# Patient Record
Sex: MALE | Race: WHITE | NOT HISPANIC OR LATINO | Employment: FULL TIME | URBAN - METROPOLITAN AREA
[De-identification: names, ages, dates, MRNs, and addresses within clinical notes are randomized per-mention and may not be internally consistent; named-entity substitution may affect disease eponyms.]

---

## 2018-01-05 ENCOUNTER — GENERIC CONVERSION - ENCOUNTER (OUTPATIENT)
Dept: OTHER | Facility: OTHER | Age: 48
End: 2018-01-05

## 2018-02-12 ENCOUNTER — OFFICE VISIT (OUTPATIENT)
Dept: FAMILY MEDICINE CLINIC | Facility: CLINIC | Age: 48
End: 2018-02-12
Payer: COMMERCIAL

## 2018-02-12 VITALS
WEIGHT: 292.8 LBS | TEMPERATURE: 98.3 F | BODY MASS INDEX: 38.8 KG/M2 | SYSTOLIC BLOOD PRESSURE: 120 MMHG | HEART RATE: 80 BPM | RESPIRATION RATE: 16 BRPM | DIASTOLIC BLOOD PRESSURE: 80 MMHG | HEIGHT: 73 IN

## 2018-02-12 DIAGNOSIS — R74.8 ELEVATED LIVER ENZYMES: ICD-10-CM

## 2018-02-12 DIAGNOSIS — Z00.00 PHYSICAL EXAM, ANNUAL: Primary | ICD-10-CM

## 2018-02-12 DIAGNOSIS — H31.103: ICD-10-CM

## 2018-02-12 PROCEDURE — 99396 PREV VISIT EST AGE 40-64: CPT | Performed by: FAMILY MEDICINE

## 2018-02-12 RX ORDER — ALBUTEROL SULFATE 90 UG/1
2 AEROSOL, METERED RESPIRATORY (INHALATION) EVERY 6 HOURS PRN
COMMUNITY
End: 2018-10-30 | Stop reason: SDUPTHER

## 2018-02-12 NOTE — PROGRESS NOTES
Chief Complaint   Patient presents with    Physical Exam        Patient ID: Nicole Colindres is a 49 y o  male  Pt seen and examined  Sees a physician at [facility] but needs referral     Pt had bw done in June  Pt has lost 35 lbs since July and had repeat done in October    Has hx of elevated liver enzymes  Last cmp in June noted AST=43 and ALT=56    Has hx of macular degeneration and vision loss and sees retinal specialist -- needs a referral          Past Medical History:   Diagnosis Date    Anal fissure 01/16/2004    Infectious gastroenteritis 12/11/2012    Pilonidal cyst with abscess 01/16/2004    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       Past Surgical History:   Procedure Laterality Date    HIP SURGERY      TONSILLECTOMY      VASECTOMY         Patient Active Problem List   Diagnosis    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       Family History   Problem Relation Age of Onset    Depression Mother     Hypertension Mother     Diabetes Mother     Arthritis Mother     Cancer Father     Heart disease Father        Immunization History   Administered Date(s) Administered    Td (adult), adsorbed 05/21/2007       Allergies   Allergen Reactions    Cat Hair Extract        Current Outpatient Prescriptions   Medication Sig Dispense Refill    albuterol (PROVENTIL HFA,VENTOLIN HFA) 90 mcg/act inhaler Inhale 2 puffs every 6 (six) hours as needed for wheezing       No current facility-administered medications for this visit          Social History     Social History    Marital status: /Civil Union     Spouse name: N/A    Number of children: N/A    Years of education: N/A     Social History Main Topics    Smoking status: Never Smoker    Smokeless tobacco: Never Used    Alcohol use Yes      Comment: Social drinker    Drug use: No    Sexual activity: Not Asked     Other Topics Concern    None     Social History Narrative    None       Review of Systems   Constitutional: Negative for activity change, appetite change and fatigue  Respiratory: Negative for cough and chest tightness  Cardiovascular: Negative for chest pain, palpitations and leg swelling  Gastrointestinal: Negative for abdominal pain, constipation, diarrhea, nausea and vomiting  Genitourinary: Negative for difficulty urinating  Musculoskeletal: Negative for arthralgias and myalgias  Neurological: Negative for dizziness, weakness and headaches  Hematological: Negative for adenopathy  Psychiatric/Behavioral: Negative for behavioral problems  The patient is not nervous/anxious  Objective:    /80 (BP Location: Left arm, Patient Position: Sitting, Cuff Size: Standard)   Pulse 80   Temp 98 3 °F (36 8 °C)   Resp 16   Ht 6' 1" (1 854 m)   Wt 133 kg (292 lb 12 8 oz)   BMI 38 63 kg/m²        Physical Exam   Constitutional: He is oriented to person, place, and time  He appears well-developed and well-nourished  No distress  HENT:   Head: Normocephalic and atraumatic  Right Ear: External ear normal    Left Ear: External ear normal    Nose: Nose normal    Mouth/Throat: Oropharynx is clear and moist    Eyes: Conjunctivae and EOM are normal  Pupils are equal, round, and reactive to light  Neck: Normal range of motion  Neck supple  No thyromegaly present  Cardiovascular: Normal rate, regular rhythm, normal heart sounds and intact distal pulses  No murmur heard  Pulmonary/Chest: Effort normal and breath sounds normal    Abdominal: Soft  Bowel sounds are normal  He exhibits no distension  There is no tenderness  There is no rebound and no guarding  Musculoskeletal: Normal range of motion  He exhibits no edema or deformity  Neurological: He is alert and oriented to person, place, and time  He has normal reflexes  No cranial nerve deficit  Coordination normal    Skin: Skin is warm and dry  Psychiatric: He has a normal mood and affect   His behavior is normal  Judgment and thought content normal  Assessment/Plan:         Diagnoses and all orders for this visit:    Physical exam, annual    Elevated liver enzymes  Comments:  will get hep panel and u/s  Orders:  -     US liver; Future  -     Hepatitis panel, acute; Future  -     Comprehensive metabolic panel; Future  -     Hepatitis panel, acute  -     Comprehensive metabolic panel    BMI 85 8-10 9,JAHQT  Comments:  great job on the weight loss    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  Comments:  referral for retinal specialst given   Orders:  -     Ambulatory referral to Vitreoretinal Surgery; Future    Other orders  -     albuterol (PROVENTIL HFA,VENTOLIN HFA) 90 mcg/act inhaler; Inhale 2 puffs every 6 (six) hours as needed for wheezing        Reviewed with patient the appropriate health maintenance testing  needed  Discussed the importance of diet and exercise and the role it plays in prevention of disease  Discussed with patient the importance of emotional health as well as physical health  Immunizations reviewed and discussed  There are no Patient Instructions on file for this visit                    Hannah Lance, DO

## 2018-09-28 ENCOUNTER — OFFICE VISIT (OUTPATIENT)
Dept: FAMILY MEDICINE CLINIC | Facility: CLINIC | Age: 48
End: 2018-09-28
Payer: COMMERCIAL

## 2018-09-28 VITALS
DIASTOLIC BLOOD PRESSURE: 80 MMHG | BODY MASS INDEX: 42.22 KG/M2 | WEIGHT: 315 LBS | HEART RATE: 72 BPM | RESPIRATION RATE: 14 BRPM | SYSTOLIC BLOOD PRESSURE: 152 MMHG | TEMPERATURE: 98.1 F

## 2018-09-28 DIAGNOSIS — M54.41 CHRONIC RIGHT-SIDED LOW BACK PAIN WITH RIGHT-SIDED SCIATICA: Primary | ICD-10-CM

## 2018-09-28 DIAGNOSIS — G89.29 CHRONIC RIGHT-SIDED LOW BACK PAIN WITH RIGHT-SIDED SCIATICA: Primary | ICD-10-CM

## 2018-09-28 DIAGNOSIS — R03.0 ELEVATED BLOOD PRESSURE READING WITHOUT DIAGNOSIS OF HYPERTENSION: ICD-10-CM

## 2018-09-28 PROCEDURE — 99213 OFFICE O/P EST LOW 20 MIN: CPT | Performed by: NURSE PRACTITIONER

## 2018-09-28 PROCEDURE — 96372 THER/PROPH/DIAG INJ SC/IM: CPT

## 2018-09-28 RX ORDER — METHYLPREDNISOLONE ACETATE 40 MG/ML
40 INJECTION, SUSPENSION INTRA-ARTICULAR; INTRALESIONAL; INTRAMUSCULAR; SOFT TISSUE ONCE
Status: COMPLETED | OUTPATIENT
Start: 2018-09-28 | End: 2018-09-28

## 2018-09-28 RX ORDER — CYCLOBENZAPRINE HCL 10 MG
10 TABLET ORAL
Qty: 30 TABLET | Refills: 0 | Status: SHIPPED | OUTPATIENT
Start: 2018-09-28 | End: 2018-10-30 | Stop reason: SDUPTHER

## 2018-09-28 RX ORDER — KETOROLAC TROMETHAMINE 30 MG/ML
30 INJECTION, SOLUTION INTRAMUSCULAR; INTRAVENOUS ONCE
Status: COMPLETED | OUTPATIENT
Start: 2018-09-28 | End: 2018-09-28

## 2018-09-28 RX ADMIN — METHYLPREDNISOLONE ACETATE 40 MG: 40 INJECTION, SUSPENSION INTRA-ARTICULAR; INTRALESIONAL; INTRAMUSCULAR; SOFT TISSUE at 15:11

## 2018-09-28 RX ADMIN — KETOROLAC TROMETHAMINE 30 MG: 30 INJECTION, SOLUTION INTRAMUSCULAR; INTRAVENOUS at 15:10

## 2018-09-28 NOTE — PROGRESS NOTES
Subjective   Jackson Cagle is a 51 y o  male who presents for evaluation of low back pain  The patient has had recurrent self limited episodes of low back pain in the past  Symptoms have been present for 1 week and are unchanged  Onset was related to / precipitated by no known injury and likely related to prolonged periods of yard work, weeding, slouching  The pain is located in the right gluteal area or across the lower back and radiates to the buttock  The pain is described as aching, sharp and stiffness and occurs intermittently  He rates his pain as mild  Symptoms are exacerbated by extension, flexion, lying down, twisting and bending  Symptoms are improved by nothing  He has also tried acetaminophen which provided no symptom relief  He denies fever, chills, rash, weakness in the right leg, weakness in the left leg, tingling in the right leg, tingling in the left leg, burning pain in the right leg, burning pain in the left leg, urinary hesitancy, urinary incontinence, urinary retention, bowel incontinence, constipation, impotence and groin/perineal numbness associated with the back pain  The patient has no "red flag" history indicative of complicated back pain  He reports few courses of PT in the past which have helped  "I fell off the wagon and stopped doing the exercises " ++40 pound weight gain since 2/2018    BP elevated today He denies h/o high bp  "Usually 120/80"  Reports significant stressors at work in past 3 weeks     The following portions of the patient's history were reviewed and updated as appropriate: allergies, current medications, past family history, past medical history, past social history, past surgical history and problem list     Review of Systems  Pertinent items are noted in HPI       Objective   Normal reflexes, gait, strength and negative straight-leg raise    Inspection and palpation: inspection of back is normal, paraspinal tenderness noted lumbar region, sacroiliac tenderness noted right notch  Neurological: normal strenth and sensation       Assessment/Plan   Nonspecific acute low back pain  Natural history and expected course discussed  Questions answered  Educational material distributed  Proper lifting, bending technique discussed  Stretching exercises discussed  Regular aerobic and trunk strengthening exercises discussed  Short (2-4 day) period of relative rest recommended until acute symptoms improve  Heat to affected area as needed for local pain relief  OTC analgesics as needed  Muscle relaxants per medication orders  Follow-up in 3 weeks  Consider PT-pt will call

## 2018-10-17 ENCOUNTER — TELEPHONE (OUTPATIENT)
Dept: FAMILY MEDICINE CLINIC | Facility: CLINIC | Age: 48
End: 2018-10-17

## 2018-10-17 DIAGNOSIS — M54.9 CHRONIC BACK PAIN GREATER THAN 3 MONTHS DURATION: Primary | ICD-10-CM

## 2018-10-17 DIAGNOSIS — G89.29 CHRONIC BACK PAIN GREATER THAN 3 MONTHS DURATION: Primary | ICD-10-CM

## 2018-10-17 NOTE — TELEPHONE ENCOUNTER
Janel Sterling    Patient would like to start physical therapy for back pain, as discussed at last visit  He will have it done where he works- [address]  Please write order for same and fa to [phone]

## 2018-10-30 ENCOUNTER — OFFICE VISIT (OUTPATIENT)
Dept: FAMILY MEDICINE CLINIC | Facility: CLINIC | Age: 48
End: 2018-10-30
Payer: COMMERCIAL

## 2018-10-30 VITALS
TEMPERATURE: 97.3 F | SYSTOLIC BLOOD PRESSURE: 132 MMHG | RESPIRATION RATE: 14 BRPM | WEIGHT: 315 LBS | DIASTOLIC BLOOD PRESSURE: 100 MMHG | BODY MASS INDEX: 42.22 KG/M2 | HEART RATE: 78 BPM

## 2018-10-30 DIAGNOSIS — G89.29 CHRONIC RIGHT-SIDED LOW BACK PAIN WITH RIGHT-SIDED SCIATICA: Primary | ICD-10-CM

## 2018-10-30 DIAGNOSIS — M54.41 CHRONIC RIGHT-SIDED LOW BACK PAIN WITH RIGHT-SIDED SCIATICA: Primary | ICD-10-CM

## 2018-10-30 DIAGNOSIS — J45.20 MILD INTERMITTENT ASTHMA WITHOUT COMPLICATION: ICD-10-CM

## 2018-10-30 PROCEDURE — 99213 OFFICE O/P EST LOW 20 MIN: CPT | Performed by: NURSE PRACTITIONER

## 2018-10-30 PROCEDURE — 1036F TOBACCO NON-USER: CPT | Performed by: NURSE PRACTITIONER

## 2018-10-30 RX ORDER — ALBUTEROL SULFATE 90 UG/1
2 AEROSOL, METERED RESPIRATORY (INHALATION) EVERY 6 HOURS PRN
Qty: 1 INHALER | Refills: 5 | Status: SHIPPED | OUTPATIENT
Start: 2018-10-30 | End: 2020-06-01

## 2018-10-30 RX ORDER — CYCLOBENZAPRINE HCL 10 MG
10 TABLET ORAL
Qty: 30 TABLET | Refills: 0 | Status: SHIPPED | OUTPATIENT
Start: 2018-10-30 | End: 2019-04-19 | Stop reason: ALTCHOICE

## 2018-10-30 RX ORDER — TRAMADOL HYDROCHLORIDE 50 MG/1
50 TABLET ORAL EVERY 12 HOURS PRN
Qty: 14 TABLET | Refills: 0 | Status: SHIPPED | OUTPATIENT
Start: 2018-10-30 | End: 2019-04-19 | Stop reason: ALTCHOICE

## 2018-10-30 NOTE — PROGRESS NOTES
David Yoder is a 51 y o  male who presents for re-evaluation of low back pain  The patient has had recurrent self limited episodes of low back pain in the past  Symptoms have been present for 1  Month and are unchanged  Onset was related to / precipitated by no known injury and likely related to prolonged periods of yard work, weeding, slouching  The pain is located in the right gluteal area or across the lower back and radiates to the buttock  The pain is described as aching, sharp and stiffness and occurs intermittently  He rates his pain as mild  Symptoms are exacerbated by extension, flexion, lying down, twisting and bending  Symptoms are improved by nothing  He has also tried acetaminophen, flexeril, aleve which provided no symptom relief  He denies fever, chills, rash, weakness in the right leg, weakness in the left leg, tingling in the right leg, tingling in the left leg, burning pain in the right leg, burning pain in the left leg, urinary hesitancy, urinary incontinence, urinary retention, bowel incontinence, constipation, impotence and groin/perineal numbness associated with the back pain  The patient has no "red flag" history indicative of complicated back pain  He reports few courses of PT in the past which have helped  "I fell off the wagon and stopped doing the exercises " ++40 pound weight gain since 2/2018  Since last visit he resumed PT at work  Had 3 sessions  +improved mobility    BP better today    The following portions of the patient's history were reviewed and updated as appropriate: allergies, current medications, past family history, past medical history, past social history, past surgical history and problem list     Review of Systems  Pertinent items are noted in HPI       Objective   Normal reflexes, gait, strength and negative straight-leg raise    Inspection and palpation: inspection of back is normal, paraspinal tenderness noted lumbar region, sacroiliac tenderness noted right notch  Neurological: normal strenth and sensation       Assessment/Plan   Nonspecific acute low back pain  Natural history and expected course discussed  Questions answered  Educational material distributed  Proper lifting, bending technique discussed  Stretching exercises discussed  Regular aerobic and trunk strengthening exercises discussed  Cont PT  Heat to affected area as needed for local pain relief  OTC analgesics as needed  Muscle relaxants per medication orders    Tramadol only for severe pain  XR of lumbar spine  Consider referral to Spine center in 6 weeks if no better with current plan

## 2018-11-01 ENCOUNTER — TELEPHONE (OUTPATIENT)
Dept: PHYSICAL THERAPY | Facility: OTHER | Age: 48
End: 2018-11-01

## 2018-11-01 NOTE — TELEPHONE ENCOUNTER
Unsuccessful attempt at reaching patient  Left voicemail message with callback number, asking for return call

## 2018-11-02 ENCOUNTER — HOSPITAL ENCOUNTER (OUTPATIENT)
Dept: RADIOLOGY | Facility: HOSPITAL | Age: 48
Discharge: HOME | End: 2018-11-02
Payer: COMMERCIAL

## 2018-11-02 ENCOUNTER — TRANSCRIBE ORDERS (OUTPATIENT)
Dept: ADMINISTRATIVE | Facility: HOSPITAL | Age: 48
End: 2018-11-02

## 2018-11-02 DIAGNOSIS — M54.41 CHRONIC RIGHT-SIDED LOW BACK PAIN WITH RIGHT-SIDED SCIATICA: ICD-10-CM

## 2018-11-02 DIAGNOSIS — G89.29 CHRONIC RIGHT-SIDED LOW BACK PAIN WITH RIGHT-SIDED SCIATICA: ICD-10-CM

## 2018-11-02 PROCEDURE — 72110 X-RAY EXAM L-2 SPINE 4/>VWS: CPT

## 2018-11-02 NOTE — PROGRESS NOTES
lmtrc - please advise patient of britney's message- "Xray shows arthritic changes cont plan of care" and document the same, ty

## 2018-11-05 ENCOUNTER — TELEPHONE (OUTPATIENT)
Dept: FAMILY MEDICINE CLINIC | Facility: CLINIC | Age: 48
End: 2018-11-05

## 2018-11-06 ENCOUNTER — TELEPHONE (OUTPATIENT)
Dept: PHYSICAL THERAPY | Facility: OTHER | Age: 48
End: 2018-11-06

## 2018-11-06 NOTE — TELEPHONE ENCOUNTER
Multiple unsuccessful attempts at reaching patient over multi week time frame  Left voicemail message with callback number asking for return call  Referral to be closed at this time

## 2019-03-21 ENCOUNTER — OFFICE VISIT (OUTPATIENT)
Dept: FAMILY MEDICINE CLINIC | Facility: CLINIC | Age: 49
End: 2019-03-21
Payer: COMMERCIAL

## 2019-03-21 VITALS
DIASTOLIC BLOOD PRESSURE: 74 MMHG | BODY MASS INDEX: 38.19 KG/M2 | HEART RATE: 76 BPM | TEMPERATURE: 98 F | HEIGHT: 74 IN | RESPIRATION RATE: 16 BRPM | SYSTOLIC BLOOD PRESSURE: 120 MMHG | WEIGHT: 297.6 LBS

## 2019-03-21 DIAGNOSIS — Z13.1 SCREENING FOR DIABETES MELLITUS: ICD-10-CM

## 2019-03-21 DIAGNOSIS — Z12.5 PROSTATE CANCER SCREENING: ICD-10-CM

## 2019-03-21 DIAGNOSIS — R73.01 ELEVATED FASTING BLOOD SUGAR: ICD-10-CM

## 2019-03-21 DIAGNOSIS — Z13.220 LIPID SCREENING: ICD-10-CM

## 2019-03-21 DIAGNOSIS — Z13.29 SCREENING FOR THYROID DISORDER: ICD-10-CM

## 2019-03-21 DIAGNOSIS — Z13.0 SCREENING FOR DEFICIENCY ANEMIA: ICD-10-CM

## 2019-03-21 DIAGNOSIS — R42 DIZZINESS: Primary | ICD-10-CM

## 2019-03-21 PROCEDURE — 99213 OFFICE O/P EST LOW 20 MIN: CPT | Performed by: NURSE PRACTITIONER

## 2019-03-21 PROCEDURE — 1036F TOBACCO NON-USER: CPT | Performed by: NURSE PRACTITIONER

## 2019-03-21 PROCEDURE — 3008F BODY MASS INDEX DOCD: CPT | Performed by: NURSE PRACTITIONER

## 2019-03-22 NOTE — PATIENT INSTRUCTIONS
Dizziness   WHAT YOU NEED TO KNOW:   Dizziness is a feeling of being off balance or unsteady  Common causes of dizziness are an inner ear fluid imbalance or a lack of oxygen in your blood  Dizziness may be acute (lasts 3 days or less) or chronic (lasts longer than 3 days)  You may have dizzy spells that last from seconds to a few hours  DISCHARGE INSTRUCTIONS:   Return to the emergency department if:   · You have a headache and a stiff neck  · You have shaking chills and a fever  · You vomit over and over with no relief  · Your vomit or bowel movements are red or black  · You have pain in your chest, back, or abdomen  · You have numbness, especially in your face, arms, or legs  · You have trouble moving your arms or legs  · You are confused  Contact your healthcare provider if:   · You have a fever  · Your symptoms do not get better with treatment  · You have questions or concerns about your condition or care  Manage your symptoms:   · Do not drive  or operate heavy machinery when you are dizzy  · Get up slowly  from sitting or lying down  · Drink plenty of liquids  Liquids help prevent dehydration  Ask how much liquid to drink each day and which liquids are best for you  Follow up with your healthcare provider as directed:  Write down your questions so you remember to ask them during your visits  © 2017 [employer]  Information is for End User's use only and may not be sold, redistributed or otherwise used for commercial purposes  All illustrations and images included in CareNotes® are the copyrighted property of A D [employer] M [employer]  or [employer]  The above information is an  only  It is not intended as medical advice for individual conditions or treatments  Talk to your doctor, nurse or pharmacist before following any medical regimen to see if it is safe and effective for you

## 2019-03-22 NOTE — PROGRESS NOTES
Assessment/Plan:    Problem List Items Addressed This Visit        Other    Screening for diabetes mellitus    Relevant Orders    Hemoglobin A1C    Comprehensive metabolic panel    Screening for thyroid disorder    Relevant Orders    TSH, 3rd generation with Free T4 reflex      Other Visit Diagnoses     Dizziness    -  Primary    Relevant Orders    CBC and Platelet    TSH, 3rd generation with Free T4 reflex    Hemoglobin A1C    Comprehensive metabolic panel    BMI 39 3-63 1,TTMLU        Lipid screening        Relevant Orders    Lipid panel    Screening for deficiency anemia        Relevant Orders    CBC and Platelet    Prostate cancer screening        Relevant Orders    PSA, Serum (Serial Monitor)    Elevated fasting blood sugar        Relevant Orders    Hemoglobin A1C        Exam unrevealing  Transient dizziness may have been caused by a number of things  Advised comprehensive labs, physical, ekg  Consider sleep study  Patient agrees to same will have blood work, schedule PE  Will call with relapse of sxs    BMI Counseling: Body mass index is 38 21 kg/m²  Discussed the patient's BMI with him  The BMI is above average  BMI counseling and education was provided to the patient  Nutrition recommendations include reducing portion sizes, decreasing overall calorie intake and 3-5 servings of fruits/vegetables daily  Exercise recommendations include exercising 3-5 times per week  Patient Instructions   Dizziness   WHAT YOU NEED TO KNOW:   Dizziness is a feeling of being off balance or unsteady  Common causes of dizziness are an inner ear fluid imbalance or a lack of oxygen in your blood  Dizziness may be acute (lasts 3 days or less) or chronic (lasts longer than 3 days)  You may have dizzy spells that last from seconds to a few hours  DISCHARGE INSTRUCTIONS:   Return to the emergency department if:   · You have a headache and a stiff neck  · You have shaking chills and a fever       · You vomit over and over with no relief  · Your vomit or bowel movements are red or black  · You have pain in your chest, back, or abdomen  · You have numbness, especially in your face, arms, or legs  · You have trouble moving your arms or legs  · You are confused  Contact your healthcare provider if:   · You have a fever  · Your symptoms do not get better with treatment  · You have questions or concerns about your condition or care  Manage your symptoms:   · Do not drive  or operate heavy machinery when you are dizzy  · Get up slowly  from sitting or lying down  · Drink plenty of liquids  Liquids help prevent dehydration  Ask how much liquid to drink each day and which liquids are best for you  Follow up with your healthcare provider as directed:  Write down your questions so you remember to ask them during your visits  © 2017 [employer] Information is for End User's use only and may not be sold, redistributed or otherwise used for commercial purposes  All illustrations and images included in CareNotes® are the copyrighted property of [employer] Inc  or [employer]  The above information is an  only  It is not intended as medical advice for individual conditions or treatments  Talk to your doctor, nurse or pharmacist before following any medical regimen to see if it is safe and effective for you  Return for Annual physical     Subjective:      Patient ID: Eugenie Olivera is a 51 y o  male  Chief Complaint   Patient presents with    Dizziness     vision tilted and felt lightheaded for about a minute , then [id number] later it happened again   This happen last saturday       Dizziness   This is a new problem  Episode onset: one episode on saturday 3/16  lasted seconds  Episode frequency: no recurrence since sat 3/16  Associated symptoms include congestion and headaches   Pertinent negatives include no abdominal pain, arthralgias, chest pain, chills, diaphoresis, fatigue, fever, myalgias, nausea, neck pain, numbness, rash, swollen glands, vertigo, visual change, vomiting or weakness  Associated symptoms comments: Mild headache accompanied dizziness    Exacerbated by: being outside during windy day during sporting event  He has tried nothing for the symptoms  The following portions of the patient's history were reviewed and updated as appropriate: allergies, current medications, past family history, past medical history, past social history, past surgical history and problem list     Review of Systems   Constitutional: Negative for chills, diaphoresis, fatigue and fever  HENT: Positive for congestion  Negative for trouble swallowing  Eyes: Negative for visual disturbance  Respiratory: Negative  Cardiovascular: Negative  Negative for chest pain  Gastrointestinal: Negative for abdominal pain, nausea and vomiting  Endocrine: Negative  Musculoskeletal: Negative for arthralgias, myalgias and neck pain  Skin: Negative for rash  Neurological: Positive for dizziness and headaches  Negative for vertigo, tremors, syncope, weakness and numbness  Psychiatric/Behavioral: Negative  Current Outpatient Medications   Medication Sig Dispense Refill    albuterol (PROVENTIL HFA,VENTOLIN HFA) 90 mcg/act inhaler Inhale 2 puffs every 6 (six) hours as needed for wheezing 1 Inhaler 5    cyclobenzaprine (FLEXERIL) 10 mg tablet Take 1 tablet (10 mg total) by mouth daily at bedtime as needed for muscle spasms (Patient not taking: Reported on 3/21/2019) 30 tablet 0    traMADol (ULTRAM) 50 mg tablet Take 1 tablet (50 mg total) by mouth every 12 (twelve) hours as needed for moderate pain for up to 14 doses (Patient not taking: Reported on 3/21/2019) 14 tablet 0     No current facility-administered medications for this visit          Objective:    /74 (BP Location: Left arm, Patient Position: Sitting, Cuff Size: Large)   Pulse 76   Temp 98 °F (36 7 °C)   Resp 16   Ht 6' 2" (1 88 m)   Wt 135 kg (297 lb 9 6 oz)   BMI 38 21 kg/m²        Physical Exam   Constitutional: He is oriented to person, place, and time  Vital signs are normal  He appears well-developed and well-nourished  No distress  HENT:   Head: Normocephalic and atraumatic  Mouth/Throat: Oropharynx is clear and moist    Eyes: Pupils are equal, round, and reactive to light  Conjunctivae and EOM are normal    Neck: Normal range of motion  Neck supple  No thyromegaly present  Cardiovascular: Normal rate, regular rhythm and intact distal pulses  Pulmonary/Chest: Effort normal and breath sounds normal    Musculoskeletal: He exhibits no edema  Lymphadenopathy:     He has no cervical adenopathy  Neurological: He is alert and oriented to person, place, and time  He displays a negative Romberg sign  Coordination and gait normal    Skin: Skin is warm and dry  Capillary refill takes less than 2 seconds  No pallor  Psychiatric: He has a normal mood and affect  Nursing note and vitals reviewed               Itz Nam, [facility]

## 2019-04-06 LAB
ALBUMIN SERPL-MCNC: 4.4 G/DL (ref 3.5–5.5)
ALBUMIN/GLOB SERPL: 1.8 {RATIO} (ref 1.2–2.2)
ALP SERPL-CCNC: 75 IU/L (ref 39–117)
ALT SERPL-CCNC: 51 IU/L (ref 0–44)
AST SERPL-CCNC: 42 IU/L (ref 0–40)
BILIRUB SERPL-MCNC: 0.5 MG/DL (ref 0–1.2)
BUN SERPL-MCNC: 14 MG/DL (ref 6–24)
BUN/CREAT SERPL: 13 (ref 9–20)
CALCIUM SERPL-MCNC: 9.5 MG/DL (ref 8.7–10.2)
CHLORIDE SERPL-SCNC: 103 MMOL/L (ref 96–106)
CHOLEST SERPL-MCNC: 165 MG/DL (ref 100–199)
CO2 SERPL-SCNC: 22 MMOL/L (ref 20–29)
CREAT SERPL-MCNC: 1.08 MG/DL (ref 0.76–1.27)
ERYTHROCYTE [DISTWIDTH] IN BLOOD BY AUTOMATED COUNT: 13.2 % (ref 12.3–15.4)
GLOBULIN SER-MCNC: 2.4 G/DL (ref 1.5–4.5)
GLUCOSE SERPL-MCNC: 106 MG/DL (ref 65–99)
HBA1C MFR BLD: 5.3 % (ref 4.8–5.6)
HCT VFR BLD AUTO: 45.6 % (ref 37.5–51)
HDLC SERPL-MCNC: 34 MG/DL
HGB BLD-MCNC: 15.9 G/DL (ref 13–17.7)
LABCORP COMMENT: NORMAL
LDLC SERPL CALC-MCNC: 118 MG/DL (ref 0–99)
MCH RBC QN AUTO: 29.9 PG (ref 26.6–33)
MCHC RBC AUTO-ENTMCNC: 34.9 G/DL (ref 31.5–35.7)
MCV RBC AUTO: 86 FL (ref 79–97)
MICRODELETION SYND BLD/T FISH: NORMAL
PLATELET # BLD AUTO: 197 X10E3/UL (ref 150–379)
POTASSIUM SERPL-SCNC: 4.4 MMOL/L (ref 3.5–5.2)
PROT SERPL-MCNC: 6.8 G/DL (ref 6–8.5)
PSA SERPL-MCNC: 0.5 NG/ML (ref 0–4)
RBC # BLD AUTO: 5.32 X10E6/UL (ref 4.14–5.8)
SL AMB EGFR AFRICAN AMERICAN: 93 ML/MIN/1.73
SL AMB EGFR NON AFRICAN AMERICAN: 80 ML/MIN/1.73
SL AMB VLDL CHOLESTEROL CALC: 13 MG/DL (ref 5–40)
SODIUM SERPL-SCNC: 141 MMOL/L (ref 134–144)
TRIGL SERPL-MCNC: 67 MG/DL (ref 0–149)
TSH SERPL DL<=0.005 MIU/L-ACNC: 2.84 UIU/ML (ref 0.45–4.5)
WBC # BLD AUTO: 5.5 X10E3/UL (ref 3.4–10.8)

## 2019-04-19 ENCOUNTER — OFFICE VISIT (OUTPATIENT)
Dept: FAMILY MEDICINE CLINIC | Facility: CLINIC | Age: 49
End: 2019-04-19
Payer: COMMERCIAL

## 2019-04-19 VITALS
HEART RATE: 66 BPM | RESPIRATION RATE: 14 BRPM | SYSTOLIC BLOOD PRESSURE: 120 MMHG | TEMPERATURE: 97.3 F | BODY MASS INDEX: 37.09 KG/M2 | HEIGHT: 74 IN | WEIGHT: 289 LBS | DIASTOLIC BLOOD PRESSURE: 80 MMHG

## 2019-04-19 DIAGNOSIS — E66.01 MORBID OBESITY (HCC): ICD-10-CM

## 2019-04-19 DIAGNOSIS — E78.5 DYSLIPIDEMIA: ICD-10-CM

## 2019-04-19 DIAGNOSIS — Z71.2 ENCOUNTER TO DISCUSS TEST RESULTS: Primary | ICD-10-CM

## 2019-04-19 PROCEDURE — 1036F TOBACCO NON-USER: CPT | Performed by: NURSE PRACTITIONER

## 2019-04-19 PROCEDURE — 3008F BODY MASS INDEX DOCD: CPT | Performed by: NURSE PRACTITIONER

## 2019-04-19 PROCEDURE — 99213 OFFICE O/P EST LOW 20 MIN: CPT | Performed by: NURSE PRACTITIONER

## 2019-08-14 ENCOUNTER — OFFICE VISIT (OUTPATIENT)
Dept: URGENT CARE | Facility: CLINIC | Age: 49
End: 2019-08-14
Payer: COMMERCIAL

## 2019-08-14 VITALS
RESPIRATION RATE: 16 BRPM | SYSTOLIC BLOOD PRESSURE: 136 MMHG | TEMPERATURE: 98.3 F | DIASTOLIC BLOOD PRESSURE: 89 MMHG | OXYGEN SATURATION: 96 % | WEIGHT: 295.2 LBS | HEIGHT: 74 IN | HEART RATE: 66 BPM | BODY MASS INDEX: 37.88 KG/M2

## 2019-08-14 DIAGNOSIS — L23.7 ALLERGIC CONTACT DERMATITIS DUE TO PLANTS, EXCEPT FOOD: Primary | ICD-10-CM

## 2019-08-14 PROCEDURE — 99213 OFFICE O/P EST LOW 20 MIN: CPT | Performed by: FAMILY MEDICINE

## 2019-08-14 RX ORDER — PREDNISONE 20 MG/1
20 TABLET ORAL DAILY
Qty: 7 TABLET | Refills: 0 | Status: SHIPPED | OUTPATIENT
Start: 2019-08-14 | End: 2019-08-19

## 2019-08-14 NOTE — PROGRESS NOTES
[facility]        NAME: Jeremías Soni is a 51 y o  male  : 1970    MRN: [id number]  DATE: 2019  TIME: 6:55 PM    Assessment and Plan   Allergic contact dermatitis due to plants, except food [L23 7]  1  Allergic contact dermatitis due to plants, except food  predniSONE 20 mg tablet    diphenhydrAMINE-zinc acetate (BENADRYL) cream     Allergic contact dermatitis per clinical evaluation  Given a mildly tapered 5 day burst of prednisone and instructed to use antihistamines such as Claritin, Allegra or Zyrtec twice daily for the next 5 days  Prescribed Benadryl cream for any breakthrough itching  Patient Instructions     Follow up with PCP in 3-5 days  Proceed to  ER if symptoms worsen  Chief Complaint     Chief Complaint   Patient presents with   [facility] anticubital, lower abdomen left side x 2 weeks, used anti poison ivy exfoliating cream          History of Present Illness       41-year-old male presents today due to rash on the left upper extremity and torso which has persisted over the past 2 weeks  Has tried over-the-counter remedies such as poison ivy scrub, Neosporin and hydrocortisone cream   However rash in itching continues to persist   Denies any other associated symptoms such as fever chills  Does recall initial contact with poison ivy growing in his tiger pavel bed  Review of Systems   Review of Systems   Constitutional: Negative for chills and fever  Skin: Positive for rash  Allergic/Immunologic: Positive for environmental allergies           Current Medications       Current Outpatient Medications:     albuterol (PROVENTIL HFA,VENTOLIN HFA) 90 mcg/act inhaler, Inhale 2 puffs every 6 (six) hours as needed for wheezing (Patient not taking: Reported on 2019), Disp: 1 Inhaler, Rfl: 5    diphenhydrAMINE-zinc acetate (BENADRYL) cream, Apply topically 3 (three) times a day as needed for itching, Disp: 28 3 g, Rfl: 0    predniSONE 20 mg tablet, Take 1 tablet (20 mg total) by mouth daily for 5 days Take 2 pills daily for 1st 2 days  , Disp: 7 tablet, Rfl: 0    Current Allergies     Allergies as of 08/14/2019 - Reviewed 08/14/2019   Allergen Reaction Noted    Cat hair extract  09/11/2015            The following portions of the patient's history were reviewed and updated as appropriate: allergies, current medications, past family history, past medical history, past social history, past surgical history and problem list      Past Medical History:   Diagnosis Date    Anal fissure 01/16/2004    Infectious gastroenteritis 12/11/2012    Pilonidal cyst with abscess 01/16/2004    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       Past Surgical History:   Procedure Laterality Date    HIP SURGERY      TONSILLECTOMY      VASECTOMY         Family History   Problem Relation Age of Onset    Depression Mother     Hypertension Mother     Diabetes Mother     Arthritis Mother     Cancer Father     Heart disease Father          Medications have been verified  Objective   /89   Pulse 66   Temp 98 3 °F (36 8 °C)   Resp 16   Ht 6' 1 5" (1 867 m)   Wt 134 kg (295 lb 3 2 oz)   SpO2 96%   BMI 38 42 kg/m²        Physical Exam     Physical Exam   Constitutional: He is oriented to person, place, and time  He appears well-developed and well-nourished  HENT:   Head: Normocephalic and atraumatic  Eyes: Conjunctivae are normal    Pulmonary/Chest: Effort normal    Neurological: He is alert and oriented to person, place, and time  Skin: Skin is warm  Rash noted  He is not diaphoretic  There is erythema  To round patches on the left upper extremity with the more distal patch with scattered scabbing due to scratching  Left torso streaks of erythema which are raised  Psychiatric: He has a normal mood and affect  His behavior is normal  Judgment and thought content normal    Nursing note and vitals reviewed

## 2019-12-14 ENCOUNTER — OFFICE VISIT (OUTPATIENT)
Dept: FAMILY MEDICINE CLINIC | Facility: CLINIC | Age: 49
End: 2019-12-14
Payer: COMMERCIAL

## 2019-12-14 VITALS
OXYGEN SATURATION: 98 % | TEMPERATURE: 97.9 F | DIASTOLIC BLOOD PRESSURE: 84 MMHG | BODY MASS INDEX: 38.5 KG/M2 | HEART RATE: 78 BPM | RESPIRATION RATE: 16 BRPM | HEIGHT: 74 IN | WEIGHT: 300 LBS | SYSTOLIC BLOOD PRESSURE: 122 MMHG

## 2019-12-14 DIAGNOSIS — J06.9 ACUTE URI: Primary | ICD-10-CM

## 2019-12-14 DIAGNOSIS — Z12.11 ENCOUNTER FOR SCREENING FOR MALIGNANT NEOPLASM OF COLON: ICD-10-CM

## 2019-12-14 PROCEDURE — 99213 OFFICE O/P EST LOW 20 MIN: CPT | Performed by: NURSE PRACTITIONER

## 2019-12-14 PROCEDURE — 3008F BODY MASS INDEX DOCD: CPT | Performed by: NURSE PRACTITIONER

## 2019-12-14 PROCEDURE — 1036F TOBACCO NON-USER: CPT | Performed by: NURSE PRACTITIONER

## 2019-12-14 RX ORDER — AZITHROMYCIN 250 MG/1
TABLET, FILM COATED ORAL
Qty: 6 TABLET | Refills: 0 | Status: SHIPPED | OUTPATIENT
Start: 2019-12-14 | End: 2019-12-18

## 2019-12-14 RX ORDER — PROMETHAZINE HYDROCHLORIDE AND CODEINE PHOSPHATE 6.25; 1 MG/5ML; MG/5ML
5 SYRUP ORAL
Qty: 120 ML | Refills: 0 | Status: SHIPPED | OUTPATIENT
Start: 2019-12-14 | End: 2020-06-17

## 2019-12-14 RX ORDER — BENZONATATE 200 MG/1
200 CAPSULE ORAL 3 TIMES DAILY PRN
Qty: 30 CAPSULE | Refills: 1 | Status: SHIPPED | OUTPATIENT
Start: 2019-12-14 | End: 2020-06-17

## 2019-12-14 NOTE — PROGRESS NOTES
Assessment:      URI      Plan:      Discussed diagnosis and treatment of URI  Suggested symptomatic OTC remedies  Nasal saline spray for congestion  Zithromax per orders  Follow up as needed  Call in 4 days if symptoms aren't resolving  Follow up in 1 week or as needed  Subjective:       History was provided by the patient  Rubén De Jesus is a 47 y o  male who presents for evaluation of symptoms of a URI  Symptoms include nasal blockage, post nasal drip, productive cough, sinus and nasal congestion and wheezing  Onset of symptoms was 3 weeks ago, unchanged since that time  Denies lightheadedness, low grade fever, purulent nasal discharge and shortness of breath  He is drinking moderate amounts of fluids  Evaluation to date: none  Treatment to date: albuterol inhaler  The following portions of the patient's history were reviewed and updated as appropriate: allergies, current medications, past family history, past medical history, past social history, past surgical history and problem list     Review of Systems  Pertinent items are noted in HPI        Objective:      /84 (BP Location: Left arm, Patient Position: Sitting, Cuff Size: Large)   Pulse 78   Temp 97 9 °F (36 6 °C) (Tympanic)   Resp 16   Ht 6' 1 5" (1 867 m)   Wt 136 kg (300 lb)   SpO2 98%   BMI 39 04 kg/m²   General appearance: alert and oriented, in no acute distress  Head: Normocephalic, without obvious abnormality, sinuses nontender to percussion  Ears: normal TM's and external ear canals both ears  Nose: no discharge, turbinates red  Throat: lips, mucosa, and tongue normal; teeth and gums normal  Lungs: clear to auscultation bilaterally  Heart: regular rate and rhythm, S1, S2 normal, no murmur, click, rub or gallop  Lymph nodes: Cervical adenopathy: +

## 2020-01-26 ENCOUNTER — OFFICE VISIT (OUTPATIENT)
Dept: URGENT CARE | Facility: CLINIC | Age: 50
End: 2020-01-26
Payer: COMMERCIAL

## 2020-01-26 VITALS
TEMPERATURE: 102.4 F | SYSTOLIC BLOOD PRESSURE: 141 MMHG | WEIGHT: 298 LBS | HEART RATE: 109 BPM | DIASTOLIC BLOOD PRESSURE: 96 MMHG | BODY MASS INDEX: 38.24 KG/M2 | HEIGHT: 74 IN | RESPIRATION RATE: 18 BRPM | OXYGEN SATURATION: 98 %

## 2020-01-26 DIAGNOSIS — R68.89 INFLUENZA-LIKE SYMPTOMS: Primary | ICD-10-CM

## 2020-01-26 PROCEDURE — 99213 OFFICE O/P EST LOW 20 MIN: CPT | Performed by: PHYSICIAN ASSISTANT

## 2020-01-26 RX ORDER — OSELTAMIVIR PHOSPHATE 75 MG/1
75 CAPSULE ORAL 2 TIMES DAILY
Qty: 10 EACH | Refills: 0 | Status: SHIPPED | OUTPATIENT
Start: 2020-01-26 | End: 2020-01-31

## 2020-01-26 NOTE — PROGRESS NOTES
[facility] [employer] [facility]        NAME: Lisa Irving is a 47 y o  male  : 1970    MRN: [id number]  DATE: 2020  TIME: 11:48 AM    Assessment and Plan   Influenza-like symptoms [R68 89]  1  Influenza-like symptoms  oseltamivir (TAMIFLU) 75 mg capsule         Patient Instructions     Discussed condition with pt  Presentation is concerning for Influenza  We discussed options and ultimately decided to start pt on Tamiflu  and rec hydration, rest, discussed OTC cough/cold meds, fever management, need to quarantine and wear mask if must go out  Follow up with PCP in 3-5 days  Proceed to  ER if symptoms worsen  Chief Complaint     Chief Complaint   Patient presents with    Cough     Pt reports of fever with cough started approx 2 days ago   Fever         History of Present Illness       Pt presents with 2 day history of fever, chills, arthralgias, myalgias, mostly dry cough with chest discomfort and some yellow phlegm  He has been congested for the past few weeks with runny nose  Was on abx about 6-7 weeks ago  He just started taking Dayquil every 4-6 hours yesterday  Has hx of asthma  Does not smoke or vape  He has had flu vaccine  Review of Systems   Review of Systems   Constitutional: Positive for chills and fever  HENT: Positive for congestion, postnasal drip and rhinorrhea  Negative for sore throat  Respiratory: Positive for cough  Cardiovascular: Negative  Gastrointestinal: Negative  Genitourinary: Negative  Musculoskeletal: Positive for arthralgias and myalgias           Current Medications       Current Outpatient Medications:     albuterol (PROVENTIL HFA,VENTOLIN HFA) 90 mcg/act inhaler, Inhale 2 puffs every 6 (six) hours as needed for wheezing, Disp: 1 Inhaler, Rfl: 5    benzonatate (TESSALON) 200 MG capsule, Take 1 capsule (200 mg total) by mouth 3 (three) times a day as needed for cough (Patient not taking: Reported on 2020), Disp: 30 capsule, Rfl: 1   diphenhydrAMINE-zinc acetate (BENADRYL) cream, Apply topically 3 (three) times a day as needed for itching (Patient not taking: Reported on 12/14/2019), Disp: 28 3 g, Rfl: 0    oseltamivir (TAMIFLU) 75 mg capsule, Take 1 capsule (75 mg total) by mouth 2 (two) times a day for 5 days, Disp: 10 each, Rfl: 0    promethazine-codeine (PHENERGAN WITH CODEINE) 6 25-10 mg/5 mL syrup, Take 5 mL by mouth daily at bedtime as needed for cough (Patient not taking: Reported on 1/26/2020), Disp: 120 mL, Rfl: 0    Current Allergies     Allergies as of 01/26/2020 - Reviewed 01/26/2020   Allergen Reaction Noted    Cat hair extract  09/11/2015            The following portions of the patient's history were reviewed and updated as appropriate: allergies, current medications, past family history, past medical history, past social history, past surgical history and problem list      Past Medical History:   Diagnosis Date    Anal fissure 01/16/2004    Infectious gastroenteritis 12/11/2012    Pilonidal cyst with abscess 01/16/2004    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       Past Surgical History:   Procedure Laterality Date    HIP SURGERY      TONSILLECTOMY      VASECTOMY         Family History   Problem Relation Age of Onset    Depression Mother     Hypertension Mother     Diabetes Mother     Arthritis Mother     Cancer Father     Heart disease Father          Medications have been verified  Objective   /96   Pulse (!) 109   Temp (!) 102 4 °F (39 1 °C)   Resp 18   Ht 6' 2" (1 88 m)   Wt 135 kg (298 lb)   SpO2 98%   BMI 38 26 kg/m²        Physical Exam     Physical Exam   Constitutional: He is oriented to person, place, and time  He appears well-developed and well-nourished  No distress     Pt ill-appearing    HENT:   Right Ear: Hearing, tympanic membrane, external ear and ear canal normal    Left Ear: Hearing, tympanic membrane, external ear and ear canal normal    Nose: Mucosal edema (B/L boggy turbinates) present  Mouth/Throat: Mucous membranes are normal  Posterior oropharyngeal erythema (PND) present  No oropharyngeal exudate  Neck: Neck supple  Cardiovascular: Normal rate, regular rhythm and normal heart sounds  Pulmonary/Chest: Effort normal and breath sounds normal    Lymphadenopathy:     He has no cervical adenopathy  Neurological: He is alert and oriented to person, place, and time  Psychiatric: He has a normal mood and affect  Vitals reviewed

## 2020-01-26 NOTE — PATIENT INSTRUCTIONS
Influenza   WHAT YOU NEED TO KNOW:   Influenza (the flu) is an infection caused by the influenza virus  The flu is easily spread when an infected person coughs, sneezes, or has close contact with others  You may be able to spread the flu to others for 1 week or longer after signs or symptoms appear  DISCHARGE INSTRUCTIONS:   Call 911 for any of the following:   · You have trouble breathing, and your lips look purple or blue  · You have a seizure  Return to the emergency department if:   · You are dizzy, or you are urinating less or not at all  · You have a headache with a stiff neck, and you feel tired or confused  · You have new pain or pressure in your chest     · Your symptoms, such as shortness of breath, vomiting, or diarrhea, get worse  · Your symptoms, such as fever and coughing, seem to get better, but then get worse  Contact your healthcare provider if:   · You have new muscle pain or weakness  · You have questions or concerns about your condition or care  Medicines: You may need any of the following:  · Acetaminophen  decreases pain and fever  It is available without a doctor's order  Ask how much to take and how often to take it  Follow directions  Acetaminophen can cause liver damage if not taken correctly  · NSAIDs , such as ibuprofen, help decrease swelling, pain, and fever  This medicine is available with or without a doctor's order  NSAIDs can cause stomach bleeding or kidney problems in certain people  If you take blood thinner medicine, always ask your healthcare provider if NSAIDs are safe for you  Always read the medicine label and follow directions  · Antivirals  help fight a viral infection  · Take your medicine as directed  Contact your healthcare provider if you think your medicine is not helping or if you have side effects  Tell him or her if you are allergic to any medicine  Keep a list of the medicines, vitamins, and herbs you take   Include the amounts, and when and why you take them  Bring the list or the pill bottles to follow-up visits  Carry your medicine list with you in case of an emergency  Rest  as much as you can to help you recover  Drink liquids as directed  to help prevent dehydration  Ask how much liquid to drink each day and which liquids are best for you  Prevent the spread of influenza:   · Wash your hands often  Use soap and water  Wash your hands after you use the bathroom, change a child's diapers, or sneeze  Wash your hands before you prepare or eat food  Use gel hand cleanser when soap and water are not available  Do not touch your eyes, nose, or mouth unless you have washed your hands first            · Cover your mouth when you sneeze or cough  Cough into a tissue or the bend of your arm  · Clean shared items with a germ-killing   Clean table surfaces, doorknobs, and light switches  Do not share towels, silverware, and dishes with people who are sick  Wash bed sheets, towels, silverware, and dishes with soap and water  · Wear a mask  over your mouth and nose if you are sick or are near anyone who is sick  · Stay away from others  if you are sick  · Influenza vaccine  helps prevent influenza (flu)  Everyone older than 6 months should get a yearly influenza vaccine  Get the vaccine as soon as it is available, usually in September or October each year  Follow up with your healthcare provider as directed:  Write down your questions so you remember to ask them during your visits  © 2017 [employer] Information is for End User's use only and may not be sold, redistributed or otherwise used for commercial purposes  All illustrations and images included in CareNotes® are the copyrighted property of [employer] , [employer]  or Donell Redd  The above information is an  only  It is not intended as medical advice for individual conditions or treatments   Talk to your doctor, nurse or pharmacist before following any medical regimen to see if it is safe and effective for you

## 2020-03-12 ENCOUNTER — OFFICE VISIT (OUTPATIENT)
Dept: GASTROENTEROLOGY | Facility: CLINIC | Age: 50
End: 2020-03-12
Payer: COMMERCIAL

## 2020-03-12 VITALS
DIASTOLIC BLOOD PRESSURE: 84 MMHG | SYSTOLIC BLOOD PRESSURE: 126 MMHG | HEIGHT: 74 IN | RESPIRATION RATE: 18 BRPM | TEMPERATURE: 97.6 F | HEART RATE: 76 BPM | WEIGHT: 313.4 LBS | BODY MASS INDEX: 40.22 KG/M2

## 2020-03-12 DIAGNOSIS — Z12.11 SCREEN FOR COLON CANCER: ICD-10-CM

## 2020-03-12 DIAGNOSIS — L29.0 ANAL ITCHING: ICD-10-CM

## 2020-03-12 DIAGNOSIS — K21.9 GASTROESOPHAGEAL REFLUX DISEASE, ESOPHAGITIS PRESENCE NOT SPECIFIED: Primary | ICD-10-CM

## 2020-03-12 DIAGNOSIS — R79.89 ELEVATED LFTS: ICD-10-CM

## 2020-03-12 DIAGNOSIS — R74.8 ELEVATED LIVER ENZYMES: ICD-10-CM

## 2020-03-12 DIAGNOSIS — Z12.11 SCREEN FOR COLON CANCER: Primary | ICD-10-CM

## 2020-03-12 PROCEDURE — 99244 OFF/OP CNSLTJ NEW/EST MOD 40: CPT | Performed by: PHYSICIAN ASSISTANT

## 2020-03-12 RX ORDER — ASPIRIN 81 MG/1
81 TABLET ORAL DAILY
COMMUNITY
End: 2020-06-17

## 2020-03-12 RX ORDER — OMEPRAZOLE 20 MG/1
20 CAPSULE, DELAYED RELEASE ORAL EVERY MORNING
COMMUNITY
End: 2020-06-22 | Stop reason: SDUPTHER

## 2020-03-12 NOTE — PROGRESS NOTES
Consultation - [facility]  Rob Rosenberg 47 y o  male MRN: [id number]  Unit/Bed#:  Encounter: [id number]    Consults    Reason for Consult / Principal Problem:  GERD, screening colonoscopy      ASSESSMENT AND PLAN:      GERD  -continue omeprazole for now  -plan for EGD to rule out barretts in the setting of chronic symptoms  -if egd unremarkable consider transitioning to famotidine    Chronically elevated lfts  -suspect due to fatty liver, his mother has cirrhosis of unknown etiology  -will recheck LFTs along with serologic workup and  Check RUQ U/S    Perianal itching  -after bowel movements  -recommended witch hazel wipes  -will check for any obvious anatomical abnormality during colonoscopy    Need for screening colonoscopy  -discussed procedure, risks, and prep    ______________________________________________________________________    HPI:  46 yo m presents for consultation for GERD and need for screening colonoscopy  He denies irregular bowel habits or family history of colon cancer  He has never had a colonoscopy before but has remote had a sigmooidoscopy  In the past he has had issues with fissures and bleeding but not recently  Chronically he has issues with perianal itching after bowel movements which is bothersome for him he has trialed steroid creams many times but they do not seem to help  No abnormal discharge  No constipation  He has chronic GERD and has taken omeprazole 20mg daily for many years  He has chronic mild LFT elevation, ast and alt  He has never had workup for this or ultrasound  He states his mother has cirrhosis, he is unsure the etiology, not alcohol    REVIEW OF SYSTEMS:    CONSTITUTIONAL: Denies any fever, chills, rigors, and weight loss  HEENT: No earache or tinnitus  Denies hearing loss or visual disturbances  CARDIOVASCULAR: No chest pain or palpitations     RESPIRATORY: Denies any cough, hemoptysis, shortness of breath or dyspnea on exertion  GASTROINTESTINAL: As noted in the History of Present Illness  GENITOURINARY: No problems with urination  Denies any hematuria or dysuria  NEUROLOGIC: No dizziness or vertigo, denies headaches  MUSCULOSKELETAL: Denies any muscle or joint pain  SKIN: Denies skin rashes or itching  ENDOCRINE: Denies excessive thirst  Denies intolerance to heat or cold  PSYCHOSOCIAL: Denies depression or anxiety  Denies any recent memory loss  Historical Information   Past Medical History:   Diagnosis Date    Anal fissure 01/16/2004    Infectious gastroenteritis 12/11/2012    Pilonidal cyst with abscess 01/16/2004    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     Past Surgical History:   Procedure Laterality Date    HIP SURGERY      TONSILLECTOMY      VASECTOMY       Social History   Social History     Substance and Sexual Activity   Alcohol Use Yes    Comment: Social drinker     Social History     Substance and Sexual Activity   Drug Use No     Social History     Tobacco Use   Smoking Status Never Smoker   Smokeless Tobacco Never Used     Family History   Problem Relation Age of Onset    Depression Mother     Hypertension Mother     Diabetes Mother     Arthritis Mother     Cancer Father     Heart disease Father        Meds/Allergies       (Not in a hospital admission)  No current facility-administered medications for this visit  Allergies   Allergen Reactions    Cat Hair Extract            Objective     There were no vitals taken for this visit  There is no height or weight on file to calculate BMI  [unfilled]      PHYSICAL EXAM:      General Appearance:   Alert, cooperative, no distress, obese   HEENT:   Normocephalic, atraumatic, anicteric      Neck:  Supple, symmetrical, trachea midline   Lungs:   Clear to auscultation bilaterally; no rales, rhonchi or wheezing; respirations unlabored    Heart[de-identified]   Regular rate and rhythm; no murmur, rub, or gallop     Abdomen:   Soft, non-tender, non-distended; normal bowel sounds   Genitalia:   Deferred    Rectal:   Deferred    Extremities:  No edema    Pulses:  2+ and symmetric all extremities    Skin:  No jaundice, rashes, or lesions    Lymph nodes:  No palpable cervical lymphadenopathy        Lab Results:   No visits with results within 1 Day(s) from this visit  Latest known visit with results is:   Orders Only on 04/05/2019   Component Date Value    Glucose, Random 04/05/2019 106*    BUN 04/05/2019 14     Creatinine 04/05/2019 1 08     eGFR Non African American 04/05/2019 80     eGFR  04/05/2019 93     SL AMB BUN/CREATININE RA* 04/05/2019 13     Sodium 04/05/2019 141     Potassium 04/05/2019 4 4     Chloride 04/05/2019 103     CO2 04/05/2019 22     CALCIUM 04/05/2019 9 5     Protein, Total 04/05/2019 6 8     Albumin 04/05/2019 4 4     Globulin, Total 04/05/2019 2 4     Albumin/Globulin Ratio 04/05/2019 1 8     TOTAL BILIRUBIN 04/05/2019 0 5     Alk Phos Isoenzymes 04/05/2019 75     AST 04/05/2019 42*    ALT 04/05/2019 51*    White Blood Cell Count 04/05/2019 5 5     Red Blood Cell Count 04/05/2019 5 32     Hemoglobin 04/05/2019 15 9     HCT 04/05/2019 45 6     MCV 04/05/2019 86     MCH 04/05/2019 29 9     MCHC 04/05/2019 34 9     RDW 04/05/2019 13 2     Platelet Count 40/48/1527 197     Cholesterol, Total 04/05/2019 165     Triglycerides 04/05/2019 67     HDL 04/05/2019 34*    VLDL Cholesterol Calcula* 04/05/2019 13     LDL Direct 04/05/2019 118*    Prostate Specific Antige* 04/05/2019 0 5     Hemoglobin A1C 04/05/2019 5 3     TSH 04/05/2019 2 840     Interpretation 04/05/2019 Note     Comment 04/05/2019 Comment        Imaging Studies: I have personally reviewed pertinent imaging studies

## 2020-03-25 ENCOUNTER — TELEPHONE (OUTPATIENT)
Dept: GASTROENTEROLOGY | Facility: CLINIC | Age: 50
End: 2020-03-25

## 2020-03-25 NOTE — TELEPHONE ENCOUNTER
----- Message from Nini Urrutia MD sent at 3/23/2020 11:44 AM EDT -----  Please cancel patient's endoscopy and colonoscopy, please reschedule  Non urgent procedures

## 2020-03-26 NOTE — TELEPHONE ENCOUNTER
Procedure has been cancelled due to COVID-19  LMOM for pt to call office to confirm receipt of message and get rescheduled for June/July

## 2020-04-30 ENCOUNTER — TELEPHONE (OUTPATIENT)
Dept: GASTROENTEROLOGY | Facility: CLINIC | Age: 50
End: 2020-04-30

## 2020-05-31 DIAGNOSIS — J45.20 MILD INTERMITTENT ASTHMA WITHOUT COMPLICATION: ICD-10-CM

## 2020-06-04 ENCOUNTER — PREP FOR PROCEDURE (OUTPATIENT)
Dept: GASTROENTEROLOGY | Facility: AMBULARY SURGERY CENTER | Age: 50
End: 2020-06-04

## 2020-06-04 DIAGNOSIS — Z20.822 ENCOUNTER FOR LABORATORY TESTING FOR COVID-19 VIRUS: Primary | ICD-10-CM

## 2020-06-04 NOTE — TELEPHONE ENCOUNTER
LVM to confirm procedure on 6/19 with Dr Alfonso Saleh at [facility]  Told pt will need covid testing on 6/15 and can go to the [address] [place] [address] in [address] [place] [address] Road  Reminded pt will need a  for the procedure day and will get a call the day before with the arrival time  Asked pt if has picked up prep and if still has office prep instructions  Told pt to call back to confirm  COVID testing ordered

## 2020-06-16 DIAGNOSIS — Z20.822 ENCOUNTER FOR LABORATORY TESTING FOR COVID-19 VIRUS: ICD-10-CM

## 2020-06-16 PROCEDURE — U0003 INFECTIOUS AGENT DETECTION BY NUCLEIC ACID (DNA OR RNA); SEVERE ACUTE RESPIRATORY SYNDROME CORONAVIRUS 2 (SARS-COV-2) (CORONAVIRUS DISEASE [COVID-19]), AMPLIFIED PROBE TECHNIQUE, MAKING USE OF HIGH THROUGHPUT TECHNOLOGIES AS DESCRIBED BY CMS-2020-01-R: HCPCS

## 2020-06-17 LAB — SARS-COV-2 RNA SPEC QL NAA+PROBE: NOT DETECTED

## 2020-06-19 ENCOUNTER — HOSPITAL ENCOUNTER (OUTPATIENT)
Dept: GASTROENTEROLOGY | Facility: AMBULARY SURGERY CENTER | Age: 50
Setting detail: OUTPATIENT SURGERY
Discharge: HOME | End: 2020-06-19
Attending: INTERNAL MEDICINE
Payer: COMMERCIAL

## 2020-06-19 ENCOUNTER — ANESTHESIA (OUTPATIENT)
Dept: GASTROENTEROLOGY | Facility: AMBULARY SURGERY CENTER | Age: 50
End: 2020-06-19

## 2020-06-19 ENCOUNTER — ANESTHESIA EVENT (OUTPATIENT)
Dept: GASTROENTEROLOGY | Facility: AMBULARY SURGERY CENTER | Age: 50
End: 2020-06-19

## 2020-06-19 VITALS
HEIGHT: 74 IN | TEMPERATURE: 95.5 F | SYSTOLIC BLOOD PRESSURE: 121 MMHG | BODY MASS INDEX: 40.43 KG/M2 | OXYGEN SATURATION: 95 % | RESPIRATION RATE: 18 BRPM | HEART RATE: 75 BPM | WEIGHT: 315 LBS | DIASTOLIC BLOOD PRESSURE: 71 MMHG

## 2020-06-19 DIAGNOSIS — Z12.11 SCREEN FOR COLON CANCER: ICD-10-CM

## 2020-06-19 DIAGNOSIS — K21.9 GASTROESOPHAGEAL REFLUX DISEASE, ESOPHAGITIS PRESENCE NOT SPECIFIED: ICD-10-CM

## 2020-06-19 PROCEDURE — 88305 TISSUE EXAM BY PATHOLOGIST: CPT | Performed by: PATHOLOGY

## 2020-06-19 PROCEDURE — G0121 COLON CA SCRN NOT HI RSK IND: HCPCS | Performed by: INTERNAL MEDICINE

## 2020-06-19 PROCEDURE — 43239 EGD BIOPSY SINGLE/MULTIPLE: CPT | Performed by: INTERNAL MEDICINE

## 2020-06-19 PROCEDURE — NC001 PR NO CHARGE: Performed by: INTERNAL MEDICINE

## 2020-06-19 RX ORDER — SODIUM CHLORIDE, SODIUM LACTATE, POTASSIUM CHLORIDE, CALCIUM CHLORIDE 600; 310; 30; 20 MG/100ML; MG/100ML; MG/100ML; MG/100ML
125 INJECTION, SOLUTION INTRAVENOUS CONTINUOUS
Status: DISCONTINUED | OUTPATIENT
Start: 2020-06-19 | End: 2020-06-23 | Stop reason: HOSPADM

## 2020-06-19 RX ORDER — SODIUM CHLORIDE, SODIUM LACTATE, POTASSIUM CHLORIDE, CALCIUM CHLORIDE 600; 310; 30; 20 MG/100ML; MG/100ML; MG/100ML; MG/100ML
INJECTION, SOLUTION INTRAVENOUS CONTINUOUS PRN
Status: DISCONTINUED | OUTPATIENT
Start: 2020-06-19 | End: 2020-06-19 | Stop reason: SURG

## 2020-06-19 RX ORDER — PROPOFOL 10 MG/ML
INJECTION, EMULSION INTRAVENOUS CONTINUOUS PRN
Status: DISCONTINUED | OUTPATIENT
Start: 2020-06-19 | End: 2020-06-19 | Stop reason: SURG

## 2020-06-19 RX ORDER — PROPOFOL 10 MG/ML
INJECTION, EMULSION INTRAVENOUS AS NEEDED
Status: DISCONTINUED | OUTPATIENT
Start: 2020-06-19 | End: 2020-06-19 | Stop reason: SURG

## 2020-06-19 RX ADMIN — PROPOFOL 140 MCG/KG/MIN: 10 INJECTION, EMULSION INTRAVENOUS at 08:43

## 2020-06-19 RX ADMIN — SODIUM CHLORIDE, SODIUM LACTATE, POTASSIUM CHLORIDE, AND CALCIUM CHLORIDE 125 ML/HR: .6; .31; .03; .02 INJECTION, SOLUTION INTRAVENOUS at 08:11

## 2020-06-19 RX ADMIN — PROPOFOL 50 MG: 10 INJECTION, EMULSION INTRAVENOUS at 08:43

## 2020-06-19 RX ADMIN — PROPOFOL 150 MG: 10 INJECTION, EMULSION INTRAVENOUS at 08:41

## 2020-06-19 RX ADMIN — SODIUM CHLORIDE, SODIUM LACTATE, POTASSIUM CHLORIDE, AND CALCIUM CHLORIDE: .6; .31; .03; .02 INJECTION, SOLUTION INTRAVENOUS at 08:40

## 2020-06-22 DIAGNOSIS — K21.9 GASTROESOPHAGEAL REFLUX DISEASE, ESOPHAGITIS PRESENCE NOT SPECIFIED: Primary | ICD-10-CM

## 2020-06-22 RX ORDER — OMEPRAZOLE 20 MG/1
20 CAPSULE, DELAYED RELEASE ORAL EVERY MORNING
Qty: 90 CAPSULE | Refills: 2 | Status: SHIPPED | OUTPATIENT
Start: 2020-06-22 | End: 2021-02-15

## 2020-07-15 ENCOUNTER — TELEPHONE (OUTPATIENT)
Dept: GASTROENTEROLOGY | Facility: AMBULARY SURGERY CENTER | Age: 50
End: 2020-07-15

## 2020-07-15 NOTE — TELEPHONE ENCOUNTER
----- Message from [facility], MD sent at 7/10/2020 12:30 PM EDT -----  Please let patient know that stomach biopsies showed mild inflammation and esophageal biopsy showed Moe's esophagus  Moe's esophagus- cell changes of esophagus that are precancerous but do not always become cancer  The risk of cancer is still low  Repeat EGD recommended in 3 years  Continue omeprazole  Recommend follow up in GI clinic       [facility] MD EYAL

## 2020-07-22 ENCOUNTER — OFFICE VISIT (OUTPATIENT)
Dept: GASTROENTEROLOGY | Facility: CLINIC | Age: 50
End: 2020-07-22
Payer: COMMERCIAL

## 2020-07-22 VITALS — TEMPERATURE: 99.1 F | WEIGHT: 315 LBS | BODY MASS INDEX: 40.43 KG/M2 | HEIGHT: 74 IN | HEART RATE: 69 BPM

## 2020-07-22 DIAGNOSIS — K22.70 BARRETT'S ESOPHAGUS WITHOUT DYSPLASIA: Primary | ICD-10-CM

## 2020-07-22 DIAGNOSIS — R74.01 ELEVATED TRANSAMINASE LEVEL: ICD-10-CM

## 2020-07-22 PROCEDURE — 3008F BODY MASS INDEX DOCD: CPT | Performed by: PHYSICIAN ASSISTANT

## 2020-07-22 PROCEDURE — 1036F TOBACCO NON-USER: CPT | Performed by: PHYSICIAN ASSISTANT

## 2020-07-22 PROCEDURE — 99213 OFFICE O/P EST LOW 20 MIN: CPT | Performed by: PHYSICIAN ASSISTANT

## 2020-07-22 NOTE — ASSESSMENT & PLAN NOTE
Longstanding, very mild and with no stigmata of chronic liver disease    Most likely secondary to non alcoholic fatty liver disease    -periodically monitor liver enzymes, patient says he has lab work coming up with his PCP    -advised patient about role of regular exercise and weight loss in the management of fatty liver disease    -also advised he can use vitamin-E 1000 International Units once daily, as this has been shown in some trials to be beneficial in management of fatty liver disease in nondiabetic patients

## 2020-07-22 NOTE — PROGRESS NOTES
Follow-up Note -  [facility]  Alonzo Jesus 1970 47 y o  male         Reason:  Follow-up; recently diagnosed Moe's esophagus, GERD, elevated transaminases    HPI:  29-year-old male with history of obesity who presents for follow-up, he was last seen by our service when he had underwent EGD and colonoscopy last month for colorectal cancer surveillance and because of longstanding GERD symptoms for which he had been taking omeprazole  Colonoscopy only showed some small internal hemorrhoids and EGD showed a small hiatal hernia and an area of possible Moe's which was biopsied and confirmed positive for intestinal metaplasia without dysplasia  He was recommended for another EGD in a couple of years for surveillance  At this time, patient says he is taking omeprazole 20 mg once daily and says that for the last 6 weeks this has been working very well with regards to controlling acid reflux and heartburn symptoms  He denies any nausea or vomiting, denies any digestive issues in general   His bowel habits are regular and he denies any rectal bleeding, melena, diarrhea or constipation  Denies any difficulty swallowing  At the time of his last visit he had also been noted with some very mildly elevated transaminases (both ALT and AST less than 100), which was attributed to fatty liver  He says he has some lab work coming up with his PCP to follow-up on this  He says he does not have diabetes to his knowledge  Denies any use of alcohol  REVIEW OF SYSTEMS:      CONSTITUTIONAL: Denies any fever, chills, or rigors  Good appetite, and no recent weight loss  HEENT: No earache or tinnitus  Denies hearing loss or visual disturbances  CARDIOVASCULAR: No chest pain or palpitations  RESPIRATORY: Denies any cough, hemoptysis, shortness of breath or dyspnea on exertion  GASTROINTESTINAL: As noted in the History of Present Illness  GENITOURINARY: No problems with urination   Denies any hematuria or dysuria  NEUROLOGIC: No dizziness or vertigo, denies headaches  MUSCULOSKELETAL: Denies any muscle or joint pain  SKIN: Denies skin rashes or itching  ENDOCRINE: Denies excessive thirst  Denies intolerance to heat or cold  PSYCHOSOCIAL: Denies depression or anxiety  Denies any recent memory loss       Past Medical History:   Diagnosis Date    Anal fissure 01/16/2004    Arthritis     Moe esophagus     GERD (gastroesophageal reflux disease)     Hiatal hernia     Infectious gastroenteritis 12/11/2012    Morbid obesity with BMI of 40 0-44 9, adult ([facility])     Osteoarthritis     Pilonidal cyst with abscess 01/16/2004    none since 2015    PONV (postoperative nausea and vomiting)     Seasonal allergies     Slipped capital femoral epiphysis     left-surgical 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    Snores     Wears glasses       Past Surgical History:   Procedure Laterality Date    EGD AND COLONOSCOPY  2020    FRACTURE SURGERY      slipped epiphysis left femur 1893 pins    KNEE ARTHROSCOPY Right 1997    meniscectomy    PILONIDAL CYST / SINUS EXCISION      SLIPPED CAPITAL FEMORAL EPIPHYSIS PINNING Left 06/1983    TONSILLECTOMY      VASECTOMY  2008    WISDOM TOOTH EXTRACTION       Social History     Socioeconomic History    Marital status: /Civil Union     Spouse name: Not on file    Number of children: Not on file    Years of education: Not on file    Highest education level: Not on file   Occupational History    Not on file   Social Needs    Financial resource strain: Not on file    Food insecurity:     Worry: Not on file     Inability: Not on file    Transportation needs:     Medical: Not on file     Non-medical: Not on file   Tobacco Use    Smoking status: Never Smoker    Smokeless tobacco: Never Used   Substance and Sexual Activity    Alcohol use: Yes     Frequency: 2-4 times a month     Comment: Social drinker    Drug use: No    Sexual activity: Not on file   Lifestyle    Physical activity:     Days per week: Not on file     Minutes per session: Not on file    Stress: Not on file   Relationships    Social connections:     Talks on phone: Not on file     Gets together: Not on file     Attends Islam service: Not on file     Active member of club or organization: Not on file     Attends meetings of clubs or organizations: Not on file     Relationship status: Not on file    Intimate partner violence:     Fear of current or ex partner: Not on file     Emotionally abused: Not on file     Physically abused: Not on file     Forced sexual activity: Not on file   Other Topics Concern    Not on file   Social History Narrative    Not on file     Family History   Problem Relation Age of Onset    Depression Mother     Hypertension Mother     Diabetes Mother     Arthritis Mother     Cancer Mother         breast    Cancer Father         kidney    Heart disease Father         CABGx3, CAD, pacemaker    Bursitis Brother     Diverticulosis Brother     No Known Problems Daughter     No Known Problems Son      Cat hair extract  Current Outpatient Medications   Medication Sig Dispense Refill    Multiple Vitamins-Minerals (PRESERVISION AREDS 2+MULTI VIT PO) Take by mouth every morning Last dose 6/14/2020      omeprazole (PriLOSEC) 20 mg delayed release capsule Take 1 capsule (20 mg total) by mouth every morning 90 capsule 2    psyllium (METAMUCIL) 0 52 g capsule Take 0 52 g by mouth      VENTOLIN  (90 Base) MCG/ACT inhaler inhale 2 puffs by mouth every 6 hours AS NEEDED for wheezing (Patient taking differently: every 6 (six) hours as needed ) 18 g 1    Na Sulfate-K Sulfate-Mg Sulf 17 5-3 13-1 6 GM/177ML SOLN Take 1 kit by mouth see administration instructions for 1 dose (Patient not taking: Reported on 7/22/2020) 2 Bottle 0     No current facility-administered medications for this visit          Pulse 69, temperature 99 1 °F (37 3 °C), height 6' 2" (1 88 m), weight (!) 143 kg (315 lb)  PHYSICAL EXAM:      General Appearance:   Alert, cooperative, no distress, appears stated age    HEENT:   Normocephalic, atraumatic, anicteric      Neck:  Supple, symmetrical, trachea midline, no adenopathy;    thyroid: no enlargement/tenderness/nodules; no carotid  bruit or JVD    Lungs:   Clear to auscultation bilaterally; no rales, rhonchi or wheezing; respirations unlabored    Heart[de-identified]   S1 and S2 normal; regular rate and rhythm; no murmur, rub, or gallop  Abdomen:   Soft, non-tender, non-distended; normal bowel sounds; no masses, no organomegaly    Extremities: No edema, erythema, wounds, rashes   Rectal:   Deferred                      Lab Results   Component Value Date    WBC 5 5 04/05/2019    HGB 15 9 04/05/2019    HCT 45 6 04/05/2019    MCV 86 04/05/2019     04/05/2019     Lab Results   Component Value Date    K 4 4 04/05/2019    CO2 22 04/05/2019     04/05/2019    BUN 14 04/05/2019    CREATININE 1 08 04/05/2019     Lab Results   Component Value Date    ALT 51 (H) 04/05/2019    AST 42 (H) 04/05/2019     No results found for: INR, PROTIME    No results found  ASSESSMENT & PLAN:    Elevated transaminase level  Longstanding, very mild and with no stigmata of chronic liver disease  Most likely secondary to non alcoholic fatty liver disease    -periodically monitor liver enzymes, patient says he has lab work coming up with his PCP    -advised patient about role of regular exercise and weight loss in the management of fatty liver disease    -also advised he can use vitamin-E 1000 International Units once daily, as this has been shown in some trials to be beneficial in management of fatty liver disease in nondiabetic patients    Moe's esophagus without dysplasia  Underlying GERD appears to be symptomatically well managed with omeprazole 20 mg once daily  No dysplasia noted on EGD from last month    Moe's appears to be new diagnosis for this patient    -recommend surveillance EGD in 1-2 years    -continue omeprazole 20 mg daily    -advised patient about lifestyle modification strategies to mitigate GERD and reduce likelihood of progression of Moe's

## 2020-07-22 NOTE — ASSESSMENT & PLAN NOTE
Underlying GERD appears to be symptomatically well managed with omeprazole 20 mg once daily  No dysplasia noted on EGD from last month    Moe's appears to be new diagnosis for this patient    -recommend surveillance EGD in 1-2 years    -continue omeprazole 20 mg daily    -advised patient about lifestyle modification strategies to mitigate GERD and reduce likelihood of progression of Moe's

## 2020-09-18 ENCOUNTER — OFFICE VISIT (OUTPATIENT)
Dept: FAMILY MEDICINE CLINIC | Facility: CLINIC | Age: 50
End: 2020-09-18
Payer: COMMERCIAL

## 2020-09-18 VITALS
HEART RATE: 80 BPM | DIASTOLIC BLOOD PRESSURE: 84 MMHG | TEMPERATURE: 96.9 F | WEIGHT: 310.2 LBS | HEIGHT: 74 IN | BODY MASS INDEX: 39.81 KG/M2 | SYSTOLIC BLOOD PRESSURE: 120 MMHG | OXYGEN SATURATION: 95 % | RESPIRATION RATE: 18 BRPM

## 2020-09-18 DIAGNOSIS — Z13.0 SCREENING FOR DEFICIENCY ANEMIA: ICD-10-CM

## 2020-09-18 DIAGNOSIS — R73.01 ELEVATED FASTING BLOOD SUGAR: ICD-10-CM

## 2020-09-18 DIAGNOSIS — R74.8 ELEVATED LIVER ENZYMES: ICD-10-CM

## 2020-09-18 DIAGNOSIS — R06.83 SNORING: ICD-10-CM

## 2020-09-18 DIAGNOSIS — Z23 NEED FOR INFLUENZA VACCINATION: ICD-10-CM

## 2020-09-18 DIAGNOSIS — R29.818 SUSPECTED SLEEP APNEA: ICD-10-CM

## 2020-09-18 DIAGNOSIS — Z00.00 HEALTH CARE MAINTENANCE: Primary | ICD-10-CM

## 2020-09-18 DIAGNOSIS — G47.19 EXCESSIVE DAYTIME SLEEPINESS: ICD-10-CM

## 2020-09-18 DIAGNOSIS — J45.20 MILD INTERMITTENT ASTHMA WITHOUT COMPLICATION: ICD-10-CM

## 2020-09-18 DIAGNOSIS — Z12.5 PROSTATE CANCER SCREENING: ICD-10-CM

## 2020-09-18 DIAGNOSIS — Z82.49 FAMILY HISTORY OF PREMATURE CAD: ICD-10-CM

## 2020-09-18 DIAGNOSIS — R06.00 EXERTIONAL DYSPNEA: ICD-10-CM

## 2020-09-18 DIAGNOSIS — Z13.220 LIPID SCREENING: ICD-10-CM

## 2020-09-18 DIAGNOSIS — Z13.1 SCREENING FOR DIABETES MELLITUS: ICD-10-CM

## 2020-09-18 DIAGNOSIS — E78.5 DYSLIPIDEMIA: ICD-10-CM

## 2020-09-18 DIAGNOSIS — Z13.29 SCREENING FOR THYROID DISORDER: ICD-10-CM

## 2020-09-18 PROCEDURE — 1036F TOBACCO NON-USER: CPT | Performed by: NURSE PRACTITIONER

## 2020-09-18 PROCEDURE — 99396 PREV VISIT EST AGE 40-64: CPT | Performed by: NURSE PRACTITIONER

## 2020-09-18 PROCEDURE — 3725F SCREEN DEPRESSION PERFORMED: CPT | Performed by: NURSE PRACTITIONER

## 2020-09-18 PROCEDURE — 90471 IMMUNIZATION ADMIN: CPT | Performed by: NURSE PRACTITIONER

## 2020-09-18 PROCEDURE — 90682 RIV4 VACC RECOMBINANT DNA IM: CPT | Performed by: NURSE PRACTITIONER

## 2020-09-18 RX ORDER — ALBUTEROL SULFATE 90 UG/1
2 AEROSOL, METERED RESPIRATORY (INHALATION) EVERY 6 HOURS PRN
Qty: 18 G | Refills: 1 | Status: SHIPPED | OUTPATIENT
Start: 2020-09-18 | End: 2021-05-25 | Stop reason: SDUPTHER

## 2020-09-18 NOTE — PROGRESS NOTES
FAMILY [facility] HEALTH MAINTENANCE OFFICE VISIT  [facility] - [facility]    NAME: Osito Hernandez  AGE: 45 y o  SEX: male  : 1970     DATE: 2020    Assessment and Plan     1  Health care maintenance  -     CBC and Platelet; Future  -     Basic metabolic panel; Future  -     TSH, 3rd generation with Free T4 reflex; Future    2  Elevated fasting blood sugar  -     Basic metabolic panel; Future    3  Prostate cancer screening  -     PSA, Total Screen; Future    4  Screening for thyroid disorder  -     TSH, 3rd generation with Free T4 reflex; Future    5  Screening for diabetes mellitus  -     Basic metabolic panel; Future    6  Lipid screening    7  Screening for deficiency anemia  -     CBC and Platelet; Future    8  Dyslipidemia  -     Lipid panel; Future  -     TSH, 3rd generation with Free T4 reflex; Future    9  Elevated liver enzymes    10  Need for influenza vaccination  -     influenza vaccine, quadrivalent, recombinant, PF, 0 5 mL, for patients 18 yr+ (FLUBLOK)    11  Exertional dyspnea  -     Stress test only, exercise; Future; Expected date: 2020    12  Snoring  -     Diagnostic Sleep Study; Future; Expected date: 2020    13  BMI 39 0-39 9,adult  -     Diagnostic Sleep Study; Future; Expected date: 2020    14  Excessive daytime sleepiness  -     Diagnostic Sleep Study; Future; Expected date: 2020    15  Family history of premature CAD  -     Stress test only, exercise; Future; Expected date: 2020    16  Suspected sleep apnea  -     Diagnostic Sleep Study; Future; Expected date: 2020    17  Mild intermittent asthma without complication  -     albuterol (Ventolin HFA) 90 mcg/act inhaler; Inhale 2 puffs every 6 (six) hours as needed for wheezing    The case discussed with patient using patient centered shared decision making  The patient was counseled regarding instructions for management,-- risk factor reductions,-- prognosis,-- impressions,-- risks and benefits of treatment options,-- importance of compliance with treatment  I have reviewed the instructions with the patient, answering all questions to his satisfaction  Check sleep study r/o suspected CHUY  Check ETT RE: ALEJANDRE, h/o premature CAD in father, BMI 44  Comprehensive annual labs  Will bring patient to office to review all results and determine plan of care from there  In meantime will be a bit more aggressive with weight loss efforts    · Patient Counseling:   · Nutrition: Stressed importance of a well balanced diet, moderation of sodium/saturated fat, caloric balance and sufficient intake of fiber  · Exercise: Stressed the importance of regular exercise with a goal of 150 minutes per week  · Dental Health: Discussed daily flossing and brushing and regular dental visits   · Alcohol Use:  Recommended moderation of alcohol intake  · Injury Prevention: Discussed Safety Belts, Safety Helmets, and Smoke Detectors    · Immunizations reviewed: See Orders and Risks and Benefits discussed  · Discussed benefits of:  Prostate Cancer Screening  and Screening labs   BMI Counseling: Body mass index is 39 83 kg/m²  Discussed with patient's BMI with him  The BMI is above normal  Nutrition recommendations include reducing portion sizes, decreasing overall calorie intake, consuming healthier snacks, moderation in carbohydrate intake and increasing intake of lean protein  Exercise recommendations include exercising 3-5 times per week  No follow-ups on file          Chief Complaint     Chief Complaint   Patient presents with    Annual Exam       History of Present Illness     HPI    Well Adult Physical   Patient here for a comprehensive physical exam       Diet and Physical Activity  Diet: low carbohydrate diet  Exercise: several times per week      Depression Screen  PHQ-9 Depression Screening    PHQ-9:    Frequency of the following problems over the past two weeks:               General Health  Hearing: Normal:  bilateral  Vision: wears glasses  Dental: regular dental visits    Reproductive Health  No issues       The following portions of the patient's history were reviewed and updated as appropriate: allergies, current medications, past family history, past medical history, past social history, past surgical history and problem list     Review of Systems     Review of Systems   Constitutional: Positive for fatigue  Negative for fever  Eyes: Negative for visual disturbance  Respiratory:        C/o snoring, witnessed apnea during sleep (reported by souse), daytime fatigue    Also exertional dyspnea while exercising  Trying to lose weight  Father had CAD at age 39   Cardiovascular: Negative  Gastrointestinal: Negative  Endocrine: Negative  Musculoskeletal: Positive for back pain  Skin: Negative  Neurological: Negative  Psychiatric/Behavioral: Negative          Past Medical History     Past Medical History:   Diagnosis Date    Anal fissure 01/16/2004    Arthritis     Moe esophagus     GERD (gastroesophageal reflux disease)     Hiatal hernia     Infectious gastroenteritis 12/11/2012    Morbid obesity with BMI of 40 0-44 9, adult ([facility] [address] )     Osteoarthritis     Pilonidal cyst with abscess 01/16/2004    none since 2015    PONV (postoperative nausea and vomiting)     Seasonal allergies     Slipped capital femoral epiphysis     left-surgical 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    Snores     Wears glasses        Past Surgical History     Past Surgical History:   Procedure Laterality Date    EGD AND COLONOSCOPY  2020    FRACTURE SURGERY      slipped epiphysis left femur 1893 pins    KNEE ARTHROSCOPY Right 1997    meniscectomy    PILONIDAL CYST / SINUS EXCISION      SLIPPED CAPITAL FEMORAL EPIPHYSIS PINNING Left 06/1983    TONSILLECTOMY      VASECTOMY  2008    WISDOM TOOTH EXTRACTION         Social History     Social History     Socioeconomic History    Marital status: /Civil Union     Spouse name: None    Number of children: None    Years of education: None    Highest education level: None   Occupational History    None   Social Needs    Financial resource strain: None    Food insecurity     Worry: None     Inability: None    Transportation needs     Medical: None     Non-medical: None   Tobacco Use    Smoking status: Never Smoker    Smokeless tobacco: Never Used   Substance and Sexual Activity    Alcohol use: Yes     Frequency: 2-4 times a month     Comment: Social drinker    Drug use: No    Sexual activity: None   Lifestyle    Physical activity     Days per week: None     Minutes per session: None    Stress: None   Relationships    Social connections     Talks on phone: None     Gets together: None     Attends [employer] service: None     Active member of club or organization: None     Attends meetings of clubs or organizations: None     Relationship status: None    Intimate partner violence     Fear of current or ex partner: None     Emotionally abused: None     Physically abused: None     Forced sexual activity: None   Other Topics Concern    None   Social History Narrative    None       Family History     Family History   Problem Relation Age of Onset    Depression Mother     Hypertension Mother     Diabetes Mother     Arthritis Mother     Cancer Mother         breast    Cancer Father         kidney    Heart disease Father         CABGx3, CAD, pacemaker    Bursitis Brother     Diverticulosis Brother     No Known Problems Daughter     No Known Problems Son        Current Medications       Current Outpatient Medications:     albuterol (Ventolin HFA) 90 mcg/act inhaler, Inhale 2 puffs every 6 (six) hours as needed for wheezing, Disp: 18 g, Rfl: 1    Multiple Vitamins-Minerals (PRESERVISION AREDS 2+MULTI VIT PO), Take by mouth every morning Last dose 6/14/2020, Disp: , Rfl:     omeprazole (PriLOSEC) 20 mg delayed release capsule, Take 1 capsule (20 mg total) by mouth every morning, Disp: 90 capsule, Rfl: 2    psyllium (METAMUCIL) 0 52 g capsule, Take 0 52 g by mouth, Disp: , Rfl:     Na Sulfate-K Sulfate-Mg Sulf 17 5-3 13-1 6 GM/177ML SOLN, Take 1 kit by mouth see administration instructions for 1 dose (Patient not taking: Reported on 7/22/2020), Disp: 2 Bottle, Rfl: 0     Allergies     Allergies   Allergen Reactions    Cat Hair Extract      Animal dander-triggers asthma       Objective     /84   Pulse 80   Temp (!) 96 9 °F (36 1 °C)   Resp 18   Ht 6' 2" (1 88 m)   Wt (!) 141 kg (310 lb 3 2 oz)   SpO2 95%   BMI 39 83 kg/m²      Physical Exam  Vitals signs and nursing note reviewed  Constitutional:       General: He is not in acute distress  Appearance: Normal appearance  He is well-developed  He is obese  HENT:      Head: Normocephalic and atraumatic  Eyes:      Extraocular Movements: Extraocular movements intact  Conjunctiva/sclera: Conjunctivae normal       Pupils: Pupils are equal, round, and reactive to light  Neck:      Musculoskeletal: Neck supple  Thyroid: No thyromegaly  Vascular: No carotid bruit  Cardiovascular:      Rate and Rhythm: Normal rate and regular rhythm  Pulses: Normal pulses  Heart sounds: Normal heart sounds  No murmur  Pulmonary:      Effort: Pulmonary effort is normal       Breath sounds: Normal breath sounds  Abdominal:      General: Bowel sounds are normal  There is no abdominal bruit  Palpations: Abdomen is soft  Tenderness: There is no abdominal tenderness  Musculoskeletal:      Right lower leg: No edema  Left lower leg: No edema  Lymphadenopathy:      Cervical: No cervical adenopathy  Skin:     General: Skin is warm and dry  Capillary Refill: Capillary refill takes less than 2 seconds  Coloration: Skin is not pale  Neurological:      General: No focal deficit present  Mental Status: He is alert and oriented to person, place, and time  Mental status is at baseline  Sensory: No sensory deficit  Motor: No weakness     Psychiatric:         Mood and Affect: Mood normal          Behavior: Behavior normal            No exam data present        Bird Terry, [address] [facility] [address] [place]

## 2020-10-24 ENCOUNTER — OFFICE VISIT (OUTPATIENT)
Dept: URGENT CARE | Facility: CLINIC | Age: 50
End: 2020-10-24
Payer: COMMERCIAL

## 2020-10-24 VITALS
TEMPERATURE: 98.3 F | HEIGHT: 74 IN | SYSTOLIC BLOOD PRESSURE: 139 MMHG | HEART RATE: 86 BPM | DIASTOLIC BLOOD PRESSURE: 94 MMHG | WEIGHT: 312.8 LBS | OXYGEN SATURATION: 96 % | BODY MASS INDEX: 40.14 KG/M2 | RESPIRATION RATE: 16 BRPM

## 2020-10-24 DIAGNOSIS — J20.9 ACUTE BRONCHITIS, UNSPECIFIED ORGANISM: Primary | ICD-10-CM

## 2020-10-24 PROCEDURE — 99213 OFFICE O/P EST LOW 20 MIN: CPT | Performed by: PHYSICIAN ASSISTANT

## 2020-10-24 RX ORDER — BENZONATATE 200 MG/1
200 CAPSULE ORAL 3 TIMES DAILY PRN
Qty: 20 CAPSULE | Refills: 0 | Status: SHIPPED | OUTPATIENT
Start: 2020-10-24 | End: 2021-07-23

## 2020-10-24 RX ORDER — AZITHROMYCIN 250 MG/1
TABLET, FILM COATED ORAL
Qty: 6 TABLET | Refills: 0 | Status: SHIPPED | OUTPATIENT
Start: 2020-10-24 | End: 2020-10-28

## 2021-02-12 DIAGNOSIS — K21.9 GASTROESOPHAGEAL REFLUX DISEASE, UNSPECIFIED WHETHER ESOPHAGITIS PRESENT: ICD-10-CM

## 2021-02-14 DIAGNOSIS — K21.9 GASTROESOPHAGEAL REFLUX DISEASE: ICD-10-CM

## 2021-02-15 RX ORDER — OMEPRAZOLE 20 MG/1
CAPSULE, DELAYED RELEASE ORAL
Qty: 90 CAPSULE | Refills: 2 | Status: SHIPPED | OUTPATIENT
Start: 2021-02-15 | End: 2021-03-09 | Stop reason: SDUPTHER

## 2021-02-17 DIAGNOSIS — K21.9 GASTROESOPHAGEAL REFLUX DISEASE: ICD-10-CM

## 2021-02-17 RX ORDER — OMEPRAZOLE 20 MG/1
20 CAPSULE, DELAYED RELEASE ORAL EVERY MORNING
Qty: 90 CAPSULE | Refills: 0 | Status: CANCELLED | OUTPATIENT
Start: 2021-02-17

## 2021-03-09 RX ORDER — OMEPRAZOLE 20 MG/1
20 CAPSULE, DELAYED RELEASE ORAL EVERY MORNING
Qty: 90 CAPSULE | Refills: 2 | Status: SHIPPED | OUTPATIENT
Start: 2021-03-09 | End: 2021-07-01 | Stop reason: SDUPTHER

## 2021-03-10 DIAGNOSIS — Z23 ENCOUNTER FOR IMMUNIZATION: ICD-10-CM

## 2021-03-15 ENCOUNTER — IMMUNIZATIONS (OUTPATIENT)
Dept: FAMILY MEDICINE CLINIC | Facility: HOSPITAL | Age: 51
End: 2021-03-15

## 2021-03-15 DIAGNOSIS — Z23 ENCOUNTER FOR IMMUNIZATION: Primary | ICD-10-CM

## 2021-03-15 PROCEDURE — 0011A SARS-COV-2 / COVID-19 MRNA VACCINE (MODERNA) 100 MCG: CPT

## 2021-03-15 PROCEDURE — 91301 SARS-COV-2 / COVID-19 MRNA VACCINE (MODERNA) 100 MCG: CPT

## 2021-04-14 ENCOUNTER — IMMUNIZATIONS (OUTPATIENT)
Dept: FAMILY MEDICINE CLINIC | Facility: HOSPITAL | Age: 51
End: 2021-04-14

## 2021-04-14 DIAGNOSIS — Z23 ENCOUNTER FOR IMMUNIZATION: Primary | ICD-10-CM

## 2021-04-14 PROCEDURE — 0012A SARS-COV-2 / COVID-19 MRNA VACCINE (MODERNA) 100 MCG: CPT

## 2021-04-14 PROCEDURE — 91301 SARS-COV-2 / COVID-19 MRNA VACCINE (MODERNA) 100 MCG: CPT

## 2021-05-25 ENCOUNTER — OFFICE VISIT (OUTPATIENT)
Dept: FAMILY MEDICINE CLINIC | Facility: CLINIC | Age: 51
End: 2021-05-25
Payer: COMMERCIAL

## 2021-05-25 VITALS
RESPIRATION RATE: 18 BRPM | OXYGEN SATURATION: 99 % | DIASTOLIC BLOOD PRESSURE: 88 MMHG | HEART RATE: 72 BPM | SYSTOLIC BLOOD PRESSURE: 138 MMHG | WEIGHT: 288.8 LBS | HEIGHT: 74 IN | TEMPERATURE: 97.6 F | BODY MASS INDEX: 37.06 KG/M2

## 2021-05-25 DIAGNOSIS — J30.81 ANIMAL DANDER ALLERGY: ICD-10-CM

## 2021-05-25 DIAGNOSIS — H10.13 ALLERGIC CONJUNCTIVITIS OF BOTH EYES: ICD-10-CM

## 2021-05-25 DIAGNOSIS — J45.20 MILD INTERMITTENT ASTHMA WITHOUT COMPLICATION: Primary | ICD-10-CM

## 2021-05-25 PROCEDURE — 3008F BODY MASS INDEX DOCD: CPT | Performed by: NURSE PRACTITIONER

## 2021-05-25 PROCEDURE — 1036F TOBACCO NON-USER: CPT | Performed by: NURSE PRACTITIONER

## 2021-05-25 PROCEDURE — 3725F SCREEN DEPRESSION PERFORMED: CPT | Performed by: NURSE PRACTITIONER

## 2021-05-25 PROCEDURE — 99213 OFFICE O/P EST LOW 20 MIN: CPT | Performed by: NURSE PRACTITIONER

## 2021-05-25 RX ORDER — ALBUTEROL SULFATE 90 UG/1
2 AEROSOL, METERED RESPIRATORY (INHALATION) EVERY 6 HOURS PRN
Qty: 18 G | Refills: 1 | Status: SHIPPED | OUTPATIENT
Start: 2021-05-25

## 2021-05-25 RX ORDER — MONTELUKAST SODIUM 10 MG/1
10 TABLET ORAL
Qty: 90 TABLET | Refills: 1 | Status: SHIPPED | OUTPATIENT
Start: 2021-05-25 | End: 2021-07-16 | Stop reason: SDUPTHER

## 2021-05-25 RX ORDER — OLOPATADINE HYDROCHLORIDE 1 MG/ML
1 SOLUTION/ DROPS OPHTHALMIC 2 TIMES DAILY
Qty: 5 ML | Refills: 2 | Status: SHIPPED | OUTPATIENT
Start: 2021-05-25

## 2021-05-25 NOTE — PROGRESS NOTES
Assessment/Plan:    1  Mild intermittent asthma without complication  -     montelukast (SINGULAIR) 10 mg tablet; Take 1 tablet (10 mg total) by mouth daily at bedtime  -     albuterol (Ventolin HFA) 90 mcg/act inhaler; Inhale 2 puffs every 6 (six) hours as needed for wheezing    2  Allergic conjunctivitis of both eyes  -     olopatadine (PATANOL) 0 1 % ophthalmic solution; Administer 1 drop to both eyes 2 (two) times a day    3  Animal dander allergy  -     montelukast (SINGULAIR) 10 mg tablet; Take 1 tablet (10 mg total) by mouth daily at bedtime    The case discussed with patient using patient centered shared decision making  The patient was counseled regarding instructions for management,-- risk factor reductions,-- prognosis,-- impressions,-- risks and benefits of treatment options,-- importance of compliance with treatment  I have reviewed the instructions with the patient, answering all questions to his satisfaction  Continue allergra-d  Use albuterol prior to exposure to animal  Trial of singulair, patanol  Call with update in 2 weeks    BMI Counseling: Body mass index is 37 08 kg/m²  The BMI is above normal  Nutrition recommendations include decreasing portion sizes, moderation in carbohydrate intake and increasing intake of lean protein  Exercise recommendations include exercising 3-5 times per week  Lost 40 pounds with hello fresh           There are no Patient Instructions on file for this visit  Return in about 6 weeks (around 7/6/2021)  Subjective:      Patient ID: Kirt Sheppard is a 45 y o  male  Chief Complaint   Patient presents with    Asthma    Allergies     Pt c/o allergy to his new dog       Here for asthma exacerbation   Got a new puppy on previous Friday  Allergic to dogs   Said dog is hypoallergenic    Is experiencing chest tightness, wheezing, itchy eye after spending time with dog  Also experiencing spring allergy sxs  +nasal congestion    Taking allegra-d, occasional albuterol    Asthma  He complains of shortness of breath  There is no hemoptysis, sputum production or wheezing  Associated symptoms include PND and rhinorrhea  Pertinent negatives include no chest pain, ear pain, fever, headaches or sore throat  His past medical history is significant for asthma  Shortness of Breath  This is a chronic problem  The current episode started yesterday  The problem has been gradually worsening  The average episode lasts 15 minutes  Associated symptoms include coryza, PND, a rash and rhinorrhea  Pertinent negatives include no abdominal pain, chest pain, claudication, ear pain, fever, headaches, hemoptysis, leg pain, leg swelling, neck pain, orthopnea, sore throat, sputum production, swollen glands, syncope, vomiting or wheezing  The symptoms are aggravated by animal exposure  His past medical history is significant for asthma  The following portions of the patient's history were reviewed and updated as appropriate: allergies, current medications, past family history, past medical history, past social history, past surgical history and problem list     Review of Systems   Constitutional: Negative for fever  HENT: Positive for congestion and rhinorrhea  Negative for ear pain and sore throat  Eyes: Positive for itching  Respiratory: Positive for chest tightness and shortness of breath  Negative for hemoptysis, sputum production and wheezing  Cardiovascular: Positive for PND  Negative for chest pain, orthopnea, claudication, leg swelling and syncope  Gastrointestinal: Negative for abdominal pain and vomiting  Musculoskeletal: Negative for neck pain  Skin: Positive for rash  Neurological: Negative for dizziness, weakness and headaches           Current Outpatient Medications   Medication Sig Dispense Refill    albuterol (Ventolin HFA) 90 mcg/act inhaler Inhale 2 puffs every 6 (six) hours as needed for wheezing 18 g 1    Multiple Vitamins-Minerals (PRESERVISION AREDS 2+MULTI VIT PO) Take by mouth every morning Last dose 6/14/2020      omeprazole (PriLOSEC) 20 mg delayed release capsule Take 1 capsule (20 mg total) by mouth every morning 90 capsule 2    psyllium (METAMUCIL) 0 52 g capsule Take 0 52 g by mouth      benzonatate (TESSALON) 200 MG capsule Take 1 capsule (200 mg total) by mouth 3 (three) times a day as needed for cough 20 capsule 0    montelukast (SINGULAIR) 10 mg tablet Take 1 tablet (10 mg total) by mouth daily at bedtime 90 tablet 1    Na Sulfate-K Sulfate-Mg Sulf 17 5-3 13-1 6 GM/177ML SOLN Take 1 kit by mouth see administration instructions for 1 dose (Patient not taking: Reported on 7/22/2020) 2 Bottle 0    olopatadine (PATANOL) 0 1 % ophthalmic solution Administer 1 drop to both eyes 2 (two) times a day 5 mL 2     No current facility-administered medications for this visit  Objective:    /88   Pulse 72   Temp 97 6 °F (36 4 °C)   Resp 18   Ht 6' 2" (1 88 m)   Wt 131 kg (288 lb 12 8 oz)   SpO2 99%   BMI 37 08 kg/m²        Physical Exam  Vitals signs and nursing note reviewed  Constitutional:       General: He is not in acute distress  Appearance: Normal appearance  Eyes:      Conjunctiva/sclera:      Right eye: Right conjunctiva is injected  Left eye: Left conjunctiva is injected  Cardiovascular:      Rate and Rhythm: Normal rate and regular rhythm  Pulses: Normal pulses  Heart sounds: Normal heart sounds  Pulmonary:      Effort: Pulmonary effort is normal       Breath sounds: Normal breath sounds  No stridor  No wheezing  Lymphadenopathy:      Cervical: No cervical adenopathy  Skin:     Findings: No rash  Neurological:      General: No focal deficit present  Mental Status: He is alert  Mental status is at baseline     Psychiatric:         Mood and Affect: Mood normal                 [address]

## 2021-06-30 ENCOUNTER — TELEPHONE (OUTPATIENT)
Dept: GASTROENTEROLOGY | Facility: AMBULARY SURGERY CENTER | Age: 51
End: 2021-06-30

## 2021-06-30 DIAGNOSIS — K21.9 GASTROESOPHAGEAL REFLUX DISEASE, UNSPECIFIED WHETHER ESOPHAGITIS PRESENT: ICD-10-CM

## 2021-06-30 NOTE — TELEPHONE ENCOUNTER
Received fax from [facility] regarding refill request for omeprazole 20 take 1 capsule daily  Please advise  Thank you!

## 2021-06-30 NOTE — TELEPHONE ENCOUNTER
Patient had omeprazole 20 mg prescribed # 90 with 2 refills to a different pharmacy  Tried to call patient's to ask if he would [facility] or if he will  his additional refills at the pharmacy  Please let me know what patient prefers when he calls back so I can refill  Thank you!

## 2021-07-01 RX ORDER — OMEPRAZOLE 20 MG/1
20 CAPSULE, DELAYED RELEASE ORAL EVERY MORNING
Qty: 90 CAPSULE | Refills: 2 | Status: SHIPPED | OUTPATIENT
Start: 2021-07-01 | End: 2021-09-20 | Stop reason: SDUPTHER

## 2021-07-01 NOTE — TELEPHONE ENCOUNTER
Spoke with pt, [facility] now with [facility]  Can a script be sent there? Please advise  Thank you!

## 2021-07-01 NOTE — TELEPHONE ENCOUNTER
I [facility] list and sent the omeprazole there, I am not certain it is the correct [facility] if you could double check this for me, thanks!

## 2021-07-16 DIAGNOSIS — J45.20 MILD INTERMITTENT ASTHMA WITHOUT COMPLICATION: ICD-10-CM

## 2021-07-16 DIAGNOSIS — J30.81 ANIMAL DANDER ALLERGY: ICD-10-CM

## 2021-07-16 RX ORDER — MONTELUKAST SODIUM 10 MG/1
10 TABLET ORAL
Qty: 90 TABLET | Refills: 0 | Status: SHIPPED | OUTPATIENT
Start: 2021-07-16 | End: 2021-10-27 | Stop reason: SDUPTHER

## 2021-07-19 ENCOUNTER — RA CDI HCC (OUTPATIENT)
Dept: OTHER | Facility: HOSPITAL | Age: 51
End: 2021-07-19

## 2021-07-19 NOTE — PROGRESS NOTES
[place] [address]  coding opportunities             Chart reviewed, (number of) suggestions sent to provider: 1               Number of suggestions NOT actually used: 1     Patients insurance company: [address]  (Medicare Advantage and Commercial)     Visit status: Patient arrived for their scheduled appointment     Provider never responded to [place] [address]  coding request     [place] [address]  coding opportunities        DX: E66 01 Morbid Obesity--BMI 37       Chart reviewed, (number of) suggestions sent to provider: 1                  Patients insurance company: [address]  (Medicare Advantage and Commercial)

## 2021-07-23 ENCOUNTER — OFFICE VISIT (OUTPATIENT)
Dept: FAMILY MEDICINE CLINIC | Facility: CLINIC | Age: 51
End: 2021-07-23
Payer: COMMERCIAL

## 2021-07-23 VITALS
SYSTOLIC BLOOD PRESSURE: 120 MMHG | DIASTOLIC BLOOD PRESSURE: 82 MMHG | BODY MASS INDEX: 35.88 KG/M2 | HEIGHT: 74 IN | WEIGHT: 279.6 LBS | HEART RATE: 76 BPM | TEMPERATURE: 97.9 F | OXYGEN SATURATION: 97 % | RESPIRATION RATE: 14 BRPM

## 2021-07-23 DIAGNOSIS — Z13.0 SCREENING, DEFICIENCY ANEMIA, IRON: ICD-10-CM

## 2021-07-23 DIAGNOSIS — Z00.00 HEALTH CARE MAINTENANCE: Primary | ICD-10-CM

## 2021-07-23 DIAGNOSIS — Z23 NEED FOR SHINGLES VACCINE: ICD-10-CM

## 2021-07-23 DIAGNOSIS — Z79.899 DRUG THERAPY: ICD-10-CM

## 2021-07-23 DIAGNOSIS — Z12.5 PROSTATE CANCER SCREENING: ICD-10-CM

## 2021-07-23 DIAGNOSIS — R74.01 ELEVATED TRANSAMINASE LEVEL: ICD-10-CM

## 2021-07-23 DIAGNOSIS — R07.89 ATYPICAL CHEST PAIN: ICD-10-CM

## 2021-07-23 DIAGNOSIS — Z13.29 SCREENING FOR THYROID DISORDER: ICD-10-CM

## 2021-07-23 DIAGNOSIS — E78.5 DYSLIPIDEMIA: ICD-10-CM

## 2021-07-23 PROCEDURE — 90471 IMMUNIZATION ADMIN: CPT | Performed by: NURSE PRACTITIONER

## 2021-07-23 PROCEDURE — 90750 HZV VACC RECOMBINANT IM: CPT | Performed by: NURSE PRACTITIONER

## 2021-07-23 PROCEDURE — 3008F BODY MASS INDEX DOCD: CPT | Performed by: NURSE PRACTITIONER

## 2021-07-23 PROCEDURE — 1036F TOBACCO NON-USER: CPT | Performed by: NURSE PRACTITIONER

## 2021-07-23 PROCEDURE — 99396 PREV VISIT EST AGE 40-64: CPT | Performed by: NURSE PRACTITIONER

## 2021-07-23 NOTE — PROGRESS NOTES
[facility] HEALTH MAINTENANCE OFFICE VISIT  [facility]    NAME: Jonatan Morrow  AGE: 45 y o  SEX: male  : 1970     DATE: 2021    Assessment and Plan     1  Health care maintenance    2  Need for shingles vaccine  -     Zoster Vaccine Recombinant IM    3  Atypical chest pain  -     Stress test only, exercise; Future; Expected date: 2021    4  Elevated transaminase level  -     Comprehensive metabolic panel; Future    5  Dyslipidemia  -     Lipid panel; Future    6  Prostate cancer screening  -     PSA, Total Screen; Future    7  Screening for thyroid disorder  -     TSH, 3rd generation with Free T4 reflex; Future    8  Screening, deficiency anemia, iron  -     CBC and Platelet; Future    9  Drug therapy  -     Comprehensive metabolic panel; Future  -     CBC and Platelet; Future  -     TSH, 3rd generation with Free T4 reflex; Future    The case discussed with patient using patient centered shared decision making  The patient was counseled regarding instructions for management,-- risk factor reductions,-- prognosis,-- impressions,-- risks and benefits of treatment options,-- importance of compliance with treatment  I have reviewed the instructions with the patient, answering all questions to his satisfaction  ETT tbs  Pt does not have time for EKG today   Will return at a later time  Comprehensive annual labs  ·   · Patient Counseling:   · Nutrition: Stressed importance of a well balanced diet, moderation of sodium/saturated fat, caloric balance and sufficient intake of fiber  · Exercise: Stressed the importance of regular exercise with a goal of 150 minutes per week  · Dental Health: Discussed daily flossing and brushing and regular dental visits   · Alcohol Use:  Recommended moderation of alcohol intake  · Injury Prevention: Discussed Safety Belts, Safety Helmets, and Smoke Detectors    · Immunizations reviewed: See Orders and Risks and Benefits discussed  · Discussed benefits of:  Prostate Cancer Screening  and Screening labs   BMI Counseling: Body mass index is 35 9 kg/m²  Discussed with patient's BMI with him  The BMI is above normal  Nutrition recommendations include reducing portion sizes, decreasing overall calorie intake, consuming healthier snacks, moderation in carbohydrate intake and increasing intake of lean protein  Exercise recommendations include exercising 3-5 times per week  No follow-ups on file  Chief Complaint     Chief Complaint   Patient presents with    Physical Exam       History of Present Illness     HPI    Well Adult Physical   Patient here for a comprehensive physical exam       Diet and Physical Activity  Diet: low carbohydrate diet  Exercise: several times per week      Depression Screen  PHQ-9 Depression Screening    PHQ-9:   Frequency of the following problems over the past two weeks:              General Health  Hearing: Normal:  bilateral  Vision: wears glasses  Dental: regular dental visits    Reproductive Health  No issues       The following portions of the patient's history were reviewed and updated as appropriate: allergies, current medications, past family history, past medical history, past social history, past surgical history and problem list     Review of Systems     Review of Systems   Constitutional: Positive for fatigue  Negative for fever  Intentionally lost 50 lbs through diet and exercise   Eyes: Negative for visual disturbance  Respiratory: Negative  Cardiovascular: Negative for palpitations and leg swelling  Woke with atypical chest pain last seconds  Occurred 2 weeks ago  None since  Has new puppy  Very active  Denies exertional chest pain, shortness of breath, palp, dizziness   Gastrointestinal: Negative  Endocrine: Negative  Musculoskeletal: Negative for back pain  Skin: Negative  Neurological: Negative  Psychiatric/Behavioral: Negative          Past Medical History     Past Medical History:   Diagnosis Date    Anal fissure 01/16/2004    Arthritis     Moe esophagus     GERD (gastroesophageal reflux disease)     Hiatal hernia     Infectious gastroenteritis 12/11/2012    Morbid obesity with BMI of 40 0-44 9, adult ([facility] [address] )     Osteoarthritis     Pilonidal cyst with abscess 01/16/2004    none since 2015    PONV (postoperative nausea and vomiting)     Seasonal allergies     Slipped capital femoral epiphysis     left-surgical 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    Snores     Wears glasses        Past Surgical History     Past Surgical History:   Procedure Laterality Date    EGD AND COLONOSCOPY  2020    FRACTURE SURGERY      slipped epiphysis left femur 1893 pins    KNEE ARTHROSCOPY Right 1997    meniscectomy    PILONIDAL CYST / SINUS EXCISION      SLIPPED CAPITAL FEMORAL EPIPHYSIS PINNING Left 06/1983    TONSILLECTOMY      VASECTOMY  2008    WISDOM TOOTH EXTRACTION         Social History     Social History     Socioeconomic History    Marital status: /Civil Union     Spouse name: None    Number of children: None    Years of education: None    Highest education level: None   Occupational History    None   Tobacco Use    Smoking status: Never Smoker    Smokeless tobacco: Never Used   Vaping Use    Vaping Use: Never used   Substance and Sexual Activity    Alcohol use: Yes     Comment: Social drinker    Drug use: No    Sexual activity: None   Other Topics Concern    None   Social History Narrative    None     Social Determinants of Health     Financial Resource Strain:     Difficulty of Paying Living Expenses:    Food Insecurity:     Worried About Running Out of Food in the Last Year:     Ran Out of Food in the Last Year:    Transportation Needs:     Lack of Transportation (Medical):      Lack of Transportation (Non-Medical):    Physical Activity:     Days of Exercise per Week:     Minutes of Exercise per Session:    Stress:     Feeling of Stress :    Social Connections:     Frequency of Communication with Friends and Family:     Frequency of Social Gatherings with Friends and Family:     Attends Faith Services:     Active Member of Clubs or Organizations:     Attends Club or Organization Meetings:     Marital Status:    Intimate Partner Violence:     Fear of Current or Ex-Partner:     Emotionally Abused:     Physically Abused:     Sexually Abused:        Family History     Family History   Problem Relation Age of Onset    Depression Mother     Hypertension Mother     Diabetes Mother     Arthritis Mother     Cancer Mother         breast    Cancer Father         kidney    Heart disease Father         CABGx3, CAD, pacemaker    Bursitis Brother     Diverticulosis Brother     No Known Problems Daughter     No Known Problems Son        Current Medications       Current Outpatient Medications:     albuterol (Ventolin HFA) 90 mcg/act inhaler, Inhale 2 puffs every 6 (six) hours as needed for wheezing, Disp: 18 g, Rfl: 1    montelukast (SINGULAIR) 10 mg tablet, Take 1 tablet (10 mg total) by mouth daily at bedtime, Disp: 90 tablet, Rfl: 0    Multiple Vitamins-Minerals (PRESERVISION AREDS 2+MULTI VIT PO), Take by mouth every morning Last dose 6/14/2020, Disp: , Rfl:     olopatadine (PATANOL) 0 1 % ophthalmic solution, Administer 1 drop to both eyes 2 (two) times a day, Disp: 5 mL, Rfl: 2    omeprazole (PriLOSEC) 20 mg delayed release capsule, Take 1 capsule (20 mg total) by mouth every morning, Disp: 90 capsule, Rfl: 2    psyllium (METAMUCIL) 0 52 g capsule, Take 0 52 g by mouth, Disp: , Rfl:      Allergies     Allergies   Allergen Reactions    Pollen Extract Other (See Comments)     Itchy burning eyes    Cat Hair Extract      Animal dander-triggers asthma       Objective     /82 (BP Location: Left arm, Patient Position: Sitting, Cuff Size: Large)   Pulse 76   Temp 97 9 °F (36 6 °C) (Temporal)   Resp 14   Ht 6' 2" (1 88 m)   Wt 127 kg (279 lb 9 6 oz)   SpO2 97%   BMI 35 90 kg/m²      Physical Exam  Vitals and nursing note reviewed  Constitutional:       General: He is not in acute distress  Appearance: Normal appearance  He is well-developed  He is obese  HENT:      Head: Normocephalic and atraumatic  Eyes:      Extraocular Movements: Extraocular movements intact  Conjunctiva/sclera: Conjunctivae normal       Pupils: Pupils are equal, round, and reactive to light  Neck:      Thyroid: No thyromegaly  Vascular: No carotid bruit  Cardiovascular:      Rate and Rhythm: Normal rate and regular rhythm  Pulses: Normal pulses  Heart sounds: Normal heart sounds  No murmur heard  Pulmonary:      Effort: Pulmonary effort is normal       Breath sounds: Normal breath sounds  Abdominal:      General: Bowel sounds are normal  There is no abdominal bruit  Palpations: Abdomen is soft  Tenderness: There is no abdominal tenderness  Musculoskeletal:      Right lower leg: No edema  Left lower leg: No edema  Lymphadenopathy:      Cervical: No cervical adenopathy  Skin:     General: Skin is warm and dry  Capillary Refill: Capillary refill takes less than 2 seconds  Coloration: Skin is not pale  Neurological:      General: No focal deficit present  Mental Status: He is alert  Mental status is at baseline  Sensory: No sensory deficit  Motor: No weakness     Psychiatric:         Mood and Affect: Mood normal          Behavior: Behavior normal            No exam data present        Paul Abell, [address] [place]

## 2021-08-20 ENCOUNTER — HOSPITAL ENCOUNTER (OUTPATIENT)
Dept: NON INVASIVE DIAGNOSTICS | Facility: HOSPITAL | Age: 51
Discharge: HOME | End: 2021-08-20
Payer: COMMERCIAL

## 2021-08-20 DIAGNOSIS — R07.89 ATYPICAL CHEST PAIN: ICD-10-CM

## 2021-08-20 LAB
ALBUMIN SERPL-MCNC: 4.2 G/DL (ref 3.6–5.1)
ALBUMIN/GLOB SERPL: 1.9 (CALC) (ref 1–2.5)
ALP SERPL-CCNC: 71 U/L (ref 35–144)
ALT SERPL-CCNC: 38 U/L (ref 9–46)
AST SERPL-CCNC: 31 U/L (ref 10–35)
BILIRUB SERPL-MCNC: 0.6 MG/DL (ref 0.2–1.2)
BUN SERPL-MCNC: 15 MG/DL (ref 7–25)
BUN/CREAT SERPL: NORMAL (CALC) (ref 6–22)
CALCIUM SERPL-MCNC: 9 MG/DL (ref 8.6–10.3)
CHLORIDE SERPL-SCNC: 104 MMOL/L (ref 98–110)
CHOLEST SERPL-MCNC: 154 MG/DL
CHOLEST/HDLC SERPL: 4.2 (CALC)
CO2 SERPL-SCNC: 29 MMOL/L (ref 20–32)
CREAT SERPL-MCNC: 0.94 MG/DL (ref 0.7–1.33)
ERYTHROCYTE [DISTWIDTH] IN BLOOD BY AUTOMATED COUNT: 12.3 % (ref 11–15)
GLOBULIN SER CALC-MCNC: 2.2 G/DL (CALC) (ref 1.9–3.7)
GLUCOSE SERPL-MCNC: 99 MG/DL (ref 65–99)
HCT VFR BLD AUTO: 45.5 % (ref 38.5–50)
HDLC SERPL-MCNC: 37 MG/DL
HGB BLD-MCNC: 15.1 G/DL (ref 13.2–17.1)
LDLC SERPL CALC-MCNC: 103 MG/DL (CALC)
MCH RBC QN AUTO: 28.7 PG (ref 27–33)
MCHC RBC AUTO-ENTMCNC: 33.2 G/DL (ref 32–36)
MCV RBC AUTO: 86.3 FL (ref 80–100)
NONHDLC SERPL-MCNC: 117 MG/DL (CALC)
PLATELET # BLD AUTO: 193 THOUSAND/UL (ref 140–400)
PMV BLD REES-ECKER: 10.6 FL (ref 7.5–12.5)
POTASSIUM SERPL-SCNC: 4.3 MMOL/L (ref 3.5–5.3)
PROT SERPL-MCNC: 6.4 G/DL (ref 6.1–8.1)
PSA SERPL-MCNC: 0.4 NG/ML
RBC # BLD AUTO: 5.27 MILLION/UL (ref 4.2–5.8)
SL AMB EGFR AFRICAN AMERICAN: 108 ML/MIN/1.73M2
SL AMB EGFR NON AFRICAN AMERICAN: 93 ML/MIN/1.73M2
SODIUM SERPL-SCNC: 139 MMOL/L (ref 135–146)
TRIGL SERPL-MCNC: 56 MG/DL
TSH SERPL-ACNC: 3.84 MIU/L (ref 0.4–4.5)
WBC # BLD AUTO: 5.9 THOUSAND/UL (ref 3.8–10.8)

## 2021-08-20 PROCEDURE — 93017 CV STRESS TEST TRACING ONLY: CPT

## 2021-08-23 LAB
CHEST PAIN STATEMENT: NORMAL
MAX DIASTOLIC BP: 95 MMHG
MAX HEART RATE: 155 BPM
MAX PREDICTED HEART RATE: 169 BPM
MAX. SYSTOLIC BP: 165 MMHG
PROTOCOL NAME: NORMAL
TARGET HR FORMULA: NORMAL
TIME IN EXERCISE PHASE: NORMAL

## 2021-08-23 PROCEDURE — 93018 CV STRESS TEST I&R ONLY: CPT | Performed by: INTERNAL MEDICINE

## 2021-08-23 PROCEDURE — 93016 CV STRESS TEST SUPVJ ONLY: CPT | Performed by: INTERNAL MEDICINE

## 2021-09-20 DIAGNOSIS — K21.9 GASTROESOPHAGEAL REFLUX DISEASE, UNSPECIFIED WHETHER ESOPHAGITIS PRESENT: ICD-10-CM

## 2021-09-20 RX ORDER — OMEPRAZOLE 20 MG/1
20 CAPSULE, DELAYED RELEASE ORAL EVERY MORNING
Qty: 90 CAPSULE | Refills: 0 | Status: SHIPPED | OUTPATIENT
Start: 2021-09-20 | End: 2021-12-10

## 2021-09-20 NOTE — TELEPHONE ENCOUNTER
Please advise patient, I renewed omeprazole for 90 days as requested, given his history of Moe's esophagus, but since he has not been seen in the office for over a year, he should be seen for office visit prior to further renewals    Thank you

## 2021-11-05 ENCOUNTER — IMMUNIZATIONS (OUTPATIENT)
Dept: FAMILY MEDICINE CLINIC | Facility: HOSPITAL | Age: 51
End: 2021-11-05

## 2021-11-05 DIAGNOSIS — Z23 ENCOUNTER FOR IMMUNIZATION: Primary | ICD-10-CM

## 2021-11-05 PROCEDURE — 91306 COVID-19 MODERNA VACC 0.25 ML BOOSTER: CPT

## 2021-11-05 PROCEDURE — 0013A COVID-19 MODERNA VACC 0.25 ML BOOSTER: CPT

## 2021-12-22 DIAGNOSIS — K21.9 GASTROESOPHAGEAL REFLUX DISEASE, UNSPECIFIED WHETHER ESOPHAGITIS PRESENT: ICD-10-CM

## 2021-12-22 RX ORDER — OMEPRAZOLE 20 MG/1
20 CAPSULE, DELAYED RELEASE ORAL DAILY
Qty: 90 CAPSULE | Refills: 1 | Status: SHIPPED | OUTPATIENT
Start: 2021-12-22 | End: 2021-12-27 | Stop reason: SDUPTHER

## 2021-12-27 DIAGNOSIS — K21.9 GASTROESOPHAGEAL REFLUX DISEASE, UNSPECIFIED WHETHER ESOPHAGITIS PRESENT: ICD-10-CM

## 2021-12-27 RX ORDER — OMEPRAZOLE 20 MG/1
20 CAPSULE, DELAYED RELEASE ORAL DAILY
Qty: 90 CAPSULE | Refills: 1 | Status: SHIPPED | OUTPATIENT
Start: 2021-12-27 | End: 2022-01-16 | Stop reason: SDUPTHER

## 2022-01-16 DIAGNOSIS — K21.9 GASTROESOPHAGEAL REFLUX DISEASE, UNSPECIFIED WHETHER ESOPHAGITIS PRESENT: ICD-10-CM

## 2022-01-17 DIAGNOSIS — K21.9 GASTROESOPHAGEAL REFLUX DISEASE, UNSPECIFIED WHETHER ESOPHAGITIS PRESENT: ICD-10-CM

## 2022-01-17 RX ORDER — OMEPRAZOLE 20 MG/1
20 CAPSULE, DELAYED RELEASE ORAL DAILY
Qty: 90 CAPSULE | Refills: 0 | Status: SHIPPED | OUTPATIENT
Start: 2022-01-17 | End: 2022-01-18 | Stop reason: SDUPTHER

## 2022-01-18 ENCOUNTER — TELEPHONE (OUTPATIENT)
Dept: FAMILY MEDICINE CLINIC | Facility: CLINIC | Age: 52
End: 2022-01-18

## 2022-01-18 DIAGNOSIS — K21.9 GASTROESOPHAGEAL REFLUX DISEASE, UNSPECIFIED WHETHER ESOPHAGITIS PRESENT: ICD-10-CM

## 2022-01-18 RX ORDER — OMEPRAZOLE 20 MG/1
20 CAPSULE, DELAYED RELEASE ORAL DAILY
Qty: 90 CAPSULE | Refills: 1 | Status: SHIPPED | OUTPATIENT
Start: 2022-01-18

## 2022-01-18 RX ORDER — OMEPRAZOLE 20 MG/1
20 CAPSULE, DELAYED RELEASE ORAL DAILY
Qty: 90 CAPSULE | Refills: 0 | Status: SHIPPED | OUTPATIENT
Start: 2022-01-18 | End: 2022-01-18 | Stop reason: SDUPTHER

## 2022-01-18 NOTE — TELEPHONE ENCOUNTER
Lanny Perkins    Omeprazole med should have been sent to [facility] not [facility]  Please resend omeprazole 20 mg to [facility], 90 quantity

## 2022-01-21 ENCOUNTER — CLINICAL SUPPORT (OUTPATIENT)
Dept: FAMILY MEDICINE CLINIC | Facility: CLINIC | Age: 52
End: 2022-01-21
Payer: COMMERCIAL

## 2022-01-21 DIAGNOSIS — Z23 NEED FOR ZOSTER VACCINATION: Primary | ICD-10-CM

## 2022-01-21 PROCEDURE — 90471 IMMUNIZATION ADMIN: CPT

## 2022-01-21 PROCEDURE — 90750 HZV VACC RECOMBINANT IM: CPT

## 2022-05-09 ENCOUNTER — RA CDI HCC (OUTPATIENT)
Dept: OTHER | Facility: HOSPITAL | Age: 52
End: 2022-05-09

## 2022-05-09 NOTE — PROGRESS NOTES
[facility]  coding opportunities       Chart reviewed, no opportunity found: CHART REVIEWED, NO OPPORTUNITY FOUND        Patients Insurance        Commercial Insurance: [employer]

## 2022-05-13 ENCOUNTER — OFFICE VISIT (OUTPATIENT)
Dept: FAMILY MEDICINE CLINIC | Facility: CLINIC | Age: 52
End: 2022-05-13
Payer: COMMERCIAL

## 2022-05-13 VITALS
HEIGHT: 74 IN | RESPIRATION RATE: 18 BRPM | TEMPERATURE: 97.7 F | HEART RATE: 78 BPM | SYSTOLIC BLOOD PRESSURE: 116 MMHG | BODY MASS INDEX: 37.14 KG/M2 | DIASTOLIC BLOOD PRESSURE: 80 MMHG | WEIGHT: 289.4 LBS | OXYGEN SATURATION: 97 %

## 2022-05-13 DIAGNOSIS — M79.672 LEFT FOOT PAIN: ICD-10-CM

## 2022-05-13 DIAGNOSIS — Z12.5 PROSTATE CANCER SCREENING: ICD-10-CM

## 2022-05-13 DIAGNOSIS — J30.81 ANIMAL DANDER ALLERGY: ICD-10-CM

## 2022-05-13 DIAGNOSIS — R73.01 ELEVATED FASTING BLOOD SUGAR: ICD-10-CM

## 2022-05-13 DIAGNOSIS — Z13.29 SCREENING FOR THYROID DISORDER: ICD-10-CM

## 2022-05-13 DIAGNOSIS — H31.103: Primary | ICD-10-CM

## 2022-05-13 DIAGNOSIS — Z13.0 SCREENING, DEFICIENCY ANEMIA, IRON: ICD-10-CM

## 2022-05-13 DIAGNOSIS — E78.5 DYSLIPIDEMIA: ICD-10-CM

## 2022-05-13 DIAGNOSIS — R74.01 ELEVATED TRANSAMINASE LEVEL: ICD-10-CM

## 2022-05-13 DIAGNOSIS — H26.8 OTHER CATARACT OF BOTH EYES: ICD-10-CM

## 2022-05-13 DIAGNOSIS — K21.9 GASTROESOPHAGEAL REFLUX DISEASE, UNSPECIFIED WHETHER ESOPHAGITIS PRESENT: ICD-10-CM

## 2022-05-13 DIAGNOSIS — J45.20 MILD INTERMITTENT ASTHMA WITHOUT COMPLICATION: ICD-10-CM

## 2022-05-13 DIAGNOSIS — Z79.899 DRUG THERAPY: ICD-10-CM

## 2022-05-13 DIAGNOSIS — Z13.1 SCREENING FOR DIABETES MELLITUS: ICD-10-CM

## 2022-05-13 PROCEDURE — 99213 OFFICE O/P EST LOW 20 MIN: CPT | Performed by: NURSE PRACTITIONER

## 2022-05-13 PROCEDURE — 3008F BODY MASS INDEX DOCD: CPT | Performed by: NURSE PRACTITIONER

## 2022-05-13 PROCEDURE — 1036F TOBACCO NON-USER: CPT | Performed by: NURSE PRACTITIONER

## 2022-05-13 PROCEDURE — 3725F SCREEN DEPRESSION PERFORMED: CPT | Performed by: NURSE PRACTITIONER

## 2022-05-13 NOTE — PROGRESS NOTES
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  -     Ambulatory Referral to Ophthalmology; Future    2  Prostate cancer screening  -     PSA, Total Screen; Future    3  Other cataract of both eyes  -     Ambulatory Referral to Ophthalmology; Future    4  Left foot pain  -     Ambulatory Referral to Podiatry; Future  -     XR foot 3+ vw left; Future; Expected date: 05/13/2022    5  Gastroesophageal reflux disease, unspecified whether esophagitis present    6  Mild intermittent asthma without complication    7  Animal dander allergy    8  Dyslipidemia  -     Lipid panel; Future    9  Elevated transaminase level  -     Comprehensive metabolic panel; Future    10  Screening for diabetes mellitus  -     Comprehensive metabolic panel; Future    11  Screening, deficiency anemia, iron  -     CBC and differential; Future    12  Screening for thyroid disorder  -     TSH, 3rd generation with Free T4 reflex; Future    13  Drug therapy  -     TSH, 3rd generation with Free T4 reflex; Future  -     Hemoglobin A1C; Future  -     Comprehensive metabolic panel; Future  -     CBC and differential; Future    14  Elevated fasting blood sugar  -     Hemoglobin A1C; Future  The case discussed with patient using patient centered shared decision making  The patient was counseled regarding instructions for management,-- risk factor reductions,-- prognosis,-- impressions,-- risks and benefits of treatment options,-- importance of compliance with treatment  I have reviewed the instructions with the patient, answering all questions to his satisfaction  rto for PE 8/2022    BMI Counseling: Body mass index is 37 16 kg/m²  The BMI is above normal  Nutrition recommendations include decreasing portion sizes, moderation in carbohydrate intake and increasing intake of lean protein  Exercise recommendations include exercising 3-5 times per week  Rationale for BMI follow-up plan is due to patient being overweight or obese  Depression Screening and Follow-up Plan: Patient was screened for depression during today's encounter  They screened negative with a PHQ-2 score of 0  There are no Patient Instructions on file for this visit  Return for Annual physical     Subjective:      Patient ID: Thuan Snow is a 45 y o  male  Chief Complaint   Patient presents with    Foot Pain       Here for multi requests  1  Told he has cataracts  He is getting a second opinion and needs referral to retinal specialist    2  C/o ongoing foot pain  Is requesting referral for podiatrist    Requesting lab slip for summertime PE      The following portions of the patient's history were reviewed and updated as appropriate: allergies, current medications, past family history, past medical history, past social history, past surgical history and problem list     Review of Systems   Constitutional: Negative for fatigue and fever  Eyes: Positive for visual disturbance  Respiratory: Negative  Cardiovascular: Negative  Musculoskeletal: Positive for arthralgias  Neurological: Negative  Current Outpatient Medications   Medication Sig Dispense Refill    montelukast (SINGULAIR) 10 mg tablet TAKE 1 TABLET DAILY AT BEDTIME 90 tablet 1    Multiple Vitamins-Minerals (PRESERVISION AREDS 2+MULTI VIT PO) Take by mouth every morning Last dose 6/14/2020      olopatadine (PATANOL) 0 1 % ophthalmic solution Administer 1 drop to both eyes 2 (two) times a day 5 mL 2    omeprazole (PriLOSEC) 20 mg delayed release capsule Take 1 capsule (20 mg total) by mouth daily 90 capsule 1    psyllium (METAMUCIL) 0 52 g capsule Take 0 52 g by mouth      albuterol (Ventolin HFA) 90 mcg/act inhaler Inhale 2 puffs every 6 (six) hours as needed for wheezing (Patient not taking: Reported on 5/13/2022) 18 g 1     No current facility-administered medications for this visit         Objective:    /80 (BP Location: Left arm, Patient Position: Sitting, Cuff Size: Large)   Pulse 78   Temp 97 7 °F (36 5 °C)   Resp 18   Ht 6' 2" (1 88 m)   Wt 131 kg (289 lb 6 4 oz)   SpO2 97%   BMI 37 16 kg/m²        Physical Exam  Vitals and nursing note reviewed  Constitutional:       General: He is not in acute distress  Appearance: Normal appearance  He is not ill-appearing  Musculoskeletal:        Feet:    Neurological:      General: No focal deficit present  Mental Status: He is alert  Mental status is at baseline     Psychiatric:         Mood and Affect: Mood normal                 [facility]

## 2022-06-10 ENCOUNTER — APPOINTMENT (OUTPATIENT)
Dept: RADIOLOGY | Facility: CLINIC | Age: 52
End: 2022-06-10
Payer: COMMERCIAL

## 2022-06-10 DIAGNOSIS — M79.672 LEFT FOOT PAIN: ICD-10-CM

## 2022-06-10 PROCEDURE — 73630 X-RAY EXAM OF FOOT: CPT

## 2022-06-24 ENCOUNTER — OFFICE VISIT (OUTPATIENT)
Dept: PODIATRY | Facility: CLINIC | Age: 52
End: 2022-06-24
Payer: COMMERCIAL

## 2022-06-24 VITALS
RESPIRATION RATE: 17 BRPM | DIASTOLIC BLOOD PRESSURE: 80 MMHG | BODY MASS INDEX: 37.09 KG/M2 | WEIGHT: 289 LBS | SYSTOLIC BLOOD PRESSURE: 116 MMHG | HEIGHT: 74 IN

## 2022-06-24 DIAGNOSIS — M21.962 ACQUIRED DEFORMITY OF LEFT FOOT: ICD-10-CM

## 2022-06-24 DIAGNOSIS — M21.42 ACQUIRED FLAT FOOT, LEFT: ICD-10-CM

## 2022-06-24 DIAGNOSIS — M21.41 ACQUIRED FLAT FOOT, RIGHT: ICD-10-CM

## 2022-06-24 DIAGNOSIS — M21.961 ACQUIRED DEFORMITY OF RIGHT FOOT: Primary | ICD-10-CM

## 2022-06-24 DIAGNOSIS — M76.72 PERONEAL TENDINITIS OF LEFT LOWER EXTREMITY: ICD-10-CM

## 2022-06-24 PROCEDURE — 99203 OFFICE O/P NEW LOW 30 MIN: CPT | Performed by: PODIATRIST

## 2022-06-24 PROCEDURE — L3000 FT INSERT UCB BERKELEY SHELL: HCPCS | Performed by: PODIATRIST

## 2022-06-24 PROCEDURE — 20551 NJX 1 TENDON ORIGIN/INSJ: CPT | Performed by: PODIATRIST

## 2022-06-24 RX ORDER — MELOXICAM 7.5 MG/1
7.5 TABLET ORAL DAILY
Qty: 10 TABLET | Refills: 0 | Status: SHIPPED | OUTPATIENT
Start: 2022-06-24 | End: 2022-07-04

## 2022-06-24 NOTE — PROGRESS NOTES
Assessment/Plan:  Acquired deformity foot bilateral   Metatarsus adductus  Acquired pes planus  Pain upon ambulation  Peroneal tendinitis left  Seed corns bilateral foot  Pain upon ambulation  Dystrophy of nail    Plan  Foot exam performed  X-rays reviewed  Patient advised on condition  Today 1 25 cc Kenalog 10 injected into area peroneus brevis tendon insertion left foot  Patient would benefit from pronation control  His feet have been casted for custom molded foot orthotics  He will use these daily  He has been placed on Mobic  All nails reduced  Seed corns reduced peers         Diagnoses and all orders for this visit:    Acquired deformity of right foot    Acquired deformity of left foot    Acquired flat foot, right    Acquired flat foot, left    Peroneal tendinitis of left lower extremity          Subjective:  Patient has pain  He has pain in his feet with ambulation  He has pain on the outside of the left foot  No history of trauma  He went to primary care  X-rays ordered      Allergies   Allergen Reactions    Pollen Extract Other (See Comments)     Itchy burning eyes    Cat Hair Extract      Animal dander-triggers asthma         Current Outpatient Medications:     albuterol (Ventolin HFA) 90 mcg/act inhaler, Inhale 2 puffs every 6 (six) hours as needed for wheezing (Patient not taking: Reported on 5/13/2022), Disp: 18 g, Rfl: 1    montelukast (SINGULAIR) 10 mg tablet, TAKE 1 TABLET DAILY AT BEDTIME, Disp: 90 tablet, Rfl: 1    Multiple Vitamins-Minerals (PRESERVISION AREDS 2+MULTI VIT PO), Take by mouth every morning Last dose 6/14/2020, Disp: , Rfl:     olopatadine (PATANOL) 0 1 % ophthalmic solution, Administer 1 drop to both eyes 2 (two) times a day, Disp: 5 mL, Rfl: 2    omeprazole (PriLOSEC) 20 mg delayed release capsule, Take 1 capsule (20 mg total) by mouth daily, Disp: 90 capsule, Rfl: 1    psyllium (METAMUCIL) 0 52 g capsule, Take 0 52 g by mouth, Disp: , Rfl:     Patient Active Problem List   Diagnosis    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    Chronic right-sided low back pain with right-sided sciatica    Chronic back pain greater than 3 months duration    Morbid obesity ([address] )    Screen for colon cancer    Dyslipidemia    Gastroesophageal reflux disease    Elevated liver enzymes    Anal itching    Moe's esophagus without dysplasia    Elevated transaminase level          Patient ID: Joana Diallo is a 45 y o  male  HPI    The following portions of the patient's history were reviewed and updated as appropriate:     family history includes Arthritis in his mother; Bursitis in his brother; Cancer in his father and mother; Depression in his mother; Diabetes in his mother; Diverticulosis in his brother; Heart disease in his father; Hypertension in his mother; No Known Problems in his daughter and son  reports that he has never smoked  He has never used smokeless tobacco  He reports current alcohol use  He reports that he does not use drugs  Vitals:    06/24/22 1311   BP: 116/80   Resp: 17       Review of Systems      Objective:  Patient's shoes and socks removed  Foot Exam    General  General Appearance: appears stated age and healthy   Orientation: alert and oriented to person, place, and time   Affect: appropriate   Gait: antalgic       Right Foot/Ankle     Inspection and Palpation  Ecchymosis: none  Swelling: dorsum   Arch: pes planus  Hammertoes: fifth toe  Hallux limitus: yes    Neurovascular  Dorsalis pedis: 3+  Posterior tibial: 3+      Left Foot/Ankle      Inspection and Palpation  Ecchymosis: none  Tenderness: bony tenderness   Swelling: dorsum   Arch: pes planus  Hammertoes: fifth toe  Hallux limitus: yes    Neurovascular  Dorsalis pedis: 3+  Posterior tibial: 3+        Physical Exam  Vitals reviewed  Constitutional:       Appearance: Normal appearance  Cardiovascular:      Rate and Rhythm: Normal rate and regular rhythm        Pulses: Dorsalis pedis pulses are 3+ on the right side and 3+ on the left side  Posterior tibial pulses are 3+ on the right side and 3+ on the left side  Musculoskeletal:      Left foot: Bony tenderness present  Comments: Patient has metatarsus adductus type foot  He pronates in stance and gait  There is pain with palpation peroneus brevis tendon insertion  X-ray demonstrates enlarged 5th metatarsal base with possible ossicle of peroneus brevis tendon insertion   Skin:     Capillary Refill: Capillary refill takes less than 2 seconds  Comments: Patient has dystrophy of all nails  Patient has seed corn submetatarsal 5 bilateral   Neurological:      Mental Status: He is alert  Psychiatric:         Mood and Affect: Mood normal          Behavior: Behavior normal          Thought Content:  Thought content normal          Judgment: Judgment normal

## 2022-09-09 ENCOUNTER — APPOINTMENT (OUTPATIENT)
Dept: RADIOLOGY | Facility: CLINIC | Age: 52
End: 2022-09-09
Payer: COMMERCIAL

## 2022-09-09 ENCOUNTER — OFFICE VISIT (OUTPATIENT)
Dept: FAMILY MEDICINE CLINIC | Facility: CLINIC | Age: 52
End: 2022-09-09
Payer: COMMERCIAL

## 2022-09-09 VITALS
RESPIRATION RATE: 16 BRPM | TEMPERATURE: 97.7 F | OXYGEN SATURATION: 98 % | HEIGHT: 74 IN | HEART RATE: 78 BPM | BODY MASS INDEX: 37.47 KG/M2 | SYSTOLIC BLOOD PRESSURE: 120 MMHG | WEIGHT: 292 LBS | DIASTOLIC BLOOD PRESSURE: 90 MMHG

## 2022-09-09 DIAGNOSIS — J06.9 UPPER RESPIRATORY TRACT INFECTION, UNSPECIFIED TYPE: ICD-10-CM

## 2022-09-09 DIAGNOSIS — J06.9 UPPER RESPIRATORY TRACT INFECTION, UNSPECIFIED TYPE: Primary | ICD-10-CM

## 2022-09-09 DIAGNOSIS — R09.81 NASAL CONGESTION: ICD-10-CM

## 2022-09-09 DIAGNOSIS — U07.1 COVID-19: ICD-10-CM

## 2022-09-09 DIAGNOSIS — J45.20 MILD INTERMITTENT ASTHMA WITHOUT COMPLICATION: ICD-10-CM

## 2022-09-09 DIAGNOSIS — J30.2 SEASONAL ALLERGIES: ICD-10-CM

## 2022-09-09 PROCEDURE — 99214 OFFICE O/P EST MOD 30 MIN: CPT | Performed by: STUDENT IN AN ORGANIZED HEALTH CARE EDUCATION/TRAINING PROGRAM

## 2022-09-09 PROCEDURE — 71046 X-RAY EXAM CHEST 2 VIEWS: CPT

## 2022-09-09 RX ORDER — AZITHROMYCIN 250 MG/1
TABLET, FILM COATED ORAL
Qty: 6 TABLET | Refills: 0 | Status: SHIPPED | OUTPATIENT
Start: 2022-09-09 | End: 2022-09-14

## 2022-09-09 RX ORDER — METHYLPREDNISOLONE 4 MG/1
TABLET ORAL
Qty: 21 EACH | Refills: 0 | Status: SHIPPED | OUTPATIENT
Start: 2022-09-09 | End: 2022-10-13

## 2022-09-09 NOTE — PROGRESS NOTES
Clinic Visit Note  Rishi Fuchs 45 y o  male   MRN: [id number]    Assessment and Plan      Diagnoses and all orders for this visit:    Upper respiratory tract infection, unspecified type  Nasal congestion  COVID-19  Upper respiratory tract infection etiology post COVID 19 viral infection  Obtain x-ray imaging today, start steroid taper pack  Nontoxic presentation  Based on clinical experience some patients do develop atypical bacterial infection after COVID-19 viral infection  , Trial steroid medication, if symptoms are not resolving would recommend starting azithromycin on Monday  Will call with results of x-ray imaging   -     XR chest pa & lateral; Future  -     azithromycin (Zithromax) 250 mg tablet; Take 2 tablets (500 mg total) by mouth daily for 1 day, THEN 1 tablet (250 mg total) daily for 4 days  -     methylPREDNISolone 4 MG tablet therapy pack; Use as directed on package    Asthma, mild intermittent  Albuterol inhaler as needed    Seasonal allergies  Continue Zyrtec antihistamine medication    My impressions and treatment recommendations were discussed in detail with the patient who verbalized understanding and had no further questions  Discharge instructions were provided  Subjective     Chief Complaint:  Follow-up visit    History of Present Illness:    Patient is a pleasant 50-year-old male coming in for follow-up visit, previous COVID-19 viral infection, still having lingering nasal congestion, chronic cough symptoms  The following portions of the patient's history were reviewed and updated as appropriate: allergies, current medications, past family history, past medical history, past social history, past surgical history and problem list     REVIEW OF SYSTEMS:  A complete 12-point review of systems is negative other than that noted in the HPI  Review of Systems   Constitutional: Negative for chills, fatigue and fever  HENT: Positive for congestion and postnasal drip   Negative for sore throat and trouble swallowing  Respiratory: Positive for cough  Negative for shortness of breath and wheezing  Cardiovascular: Negative for chest pain, palpitations and leg swelling  Neurological: Negative for dizziness and headaches  Current Outpatient Medications:     azithromycin (Zithromax) 250 mg tablet, Take 2 tablets (500 mg total) by mouth daily for 1 day, THEN 1 tablet (250 mg total) daily for 4 days  , Disp: 6 tablet, Rfl: 0    methylPREDNISolone 4 MG tablet therapy pack, Use as directed on package, Disp: 21 each, Rfl: 0    montelukast (SINGULAIR) 10 mg tablet, TAKE 1 TABLET DAILY AT BEDTIME, Disp: 90 tablet, Rfl: 1    Multiple Vitamins-Minerals (PRESERVISION AREDS 2+MULTI VIT PO), Take by mouth every morning Last dose 6/14/2020, Disp: , Rfl:     olopatadine (PATANOL) 0 1 % ophthalmic solution, Administer 1 drop to both eyes 2 (two) times a day, Disp: 5 mL, Rfl: 2    omeprazole (PriLOSEC) 20 mg delayed release capsule, Take 1 capsule (20 mg total) by mouth daily, Disp: 90 capsule, Rfl: 1    psyllium (METAMUCIL) 0 52 g capsule, Take 0 52 g by mouth, Disp: , Rfl:     albuterol (Ventolin HFA) 90 mcg/act inhaler, Inhale 2 puffs every 6 (six) hours as needed for wheezing (Patient not taking: No sig reported), Disp: 18 g, Rfl: 1  Past Medical History:   Diagnosis Date    Anal fissure 01/16/2004    Arthritis     Moe esophagus     GERD (gastroesophageal reflux disease)     Hiatal hernia     Infectious gastroenteritis 12/11/2012    Morbid obesity with BMI of 40 0-44 9, adult ([facility] )     Osteoarthritis     Pilonidal cyst with abscess 01/16/2004    none since 2015    PONV (postoperative nausea and vomiting)     Seasonal allergies     Slipped capital femoral epiphysis     left-surgical 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    Snores     Wears glasses      Past Surgical History:   Procedure Laterality Date    EGD AND COLONOSCOPY  2020    FRACTURE SURGERY      slipped epiphysis left femur 1893 pins    KNEE ARTHROSCOPY Right 1997    meniscectomy    PILONIDAL CYST / SINUS EXCISION      SLIPPED CAPITAL FEMORAL EPIPHYSIS PINNING Left 06/1983    TONSILLECTOMY      VASECTOMY  2008    WISDOM TOOTH EXTRACTION       Social History     Socioeconomic History    Marital status: /Civil Union     Spouse name: Not on file    Number of children: Not on file    Years of education: Not on file    Highest education level: Not on file   Occupational History    Not on file   Tobacco Use    Smoking status: Never Smoker    Smokeless tobacco: Never Used   Vaping Use    Vaping Use: Never used   Substance and Sexual Activity    Alcohol use: Yes     Comment: Social drinker    Drug use: No    Sexual activity: Not on file   Other Topics Concern    Not on file   Social History Narrative    Not on file     Social Determinants of Health     Financial Resource Strain: Not on file   Food Insecurity: Not on file   Transportation Needs: Not on file   Physical Activity: Not on file   Stress: Not on file   Social Connections: Not on file   Intimate Partner Violence: Not on file   Housing Stability: Not on file     Family History   Problem Relation Age of Onset    Depression Mother     Hypertension Mother     Diabetes Mother     Arthritis Mother     Cancer Mother         breast    Cancer Father         kidney    Heart disease Father         CABGx3, CAD, pacemaker    Bursitis Brother     Diverticulosis Brother     No Known Problems Daughter     No Known Problems Son      Allergies   Allergen Reactions    Pollen Extract Other (See Comments)     Itchy burning eyes    Cat Hair Extract      Animal dander-triggers asthma       Objective     Vitals:    09/09/22 1008   BP: 120/90   BP Location: Left arm   Patient Position: Sitting   Cuff Size: Adult   Pulse: 78   Resp: 16   Temp: 97 7 °F (36 5 °C)   TempSrc: Temporal   SpO2: 98%   Weight: 132 kg (292 lb)   Height: 6' 2" (1 88 m)       Physical Exam:     GENERAL: NAD, pleasant   HEENT:  NC/AT, PERRL, EOMI, no scleral icterus  CARDIAC:  RRR, +S1/S2, no S3/S4 appreciated, no m/g/r  PULMONARY:  CTA B/L, no wheezing/rales, some rhonci, non-labored breathing  ABDOMEN:  Soft, NT/ND, no rebound/guarding/rigidity  Extremities:  No edema, cyanosis, or clubbing  Musculoskeletal:  Full range of motion intact in all extremities   NEUROLOGIC: Grossly intact, no focal deficits  SKIN:  No rashes or erythema noted on exposed skin  Psych: Normal affect, mood stable    ==  PLEASE NOTE:  This encounter was completed utilizing the [employer] Direct Speech Voice Recognition Software  Grammatical errors, random word insertions, pronoun errors and incomplete sentences are occasional consequences of the system due to software limitations, ambient noise and hardware issues  These may be missed by proof reading prior to affixing electronic signature  Any questions or concerns about the content, text or information contained within the body of this dictation should be directly addressed to the physician for clarification  Please do not hesitate to call me directly if you have any any questions or concerns      DO Juan Rivera 73 Internal Medicine   [facility] - - -

## 2022-09-16 LAB
ALBUMIN SERPL-MCNC: 4.1 G/DL (ref 3.6–5.1)
ALBUMIN/GLOB SERPL: 1.8 (CALC) (ref 1–2.5)
ALP SERPL-CCNC: 67 U/L (ref 35–144)
ALT SERPL-CCNC: 69 U/L (ref 9–46)
AST SERPL-CCNC: 39 U/L (ref 10–35)
BASOPHILS # BLD AUTO: 91 CELLS/UL (ref 0–200)
BASOPHILS NFR BLD AUTO: 1.3 %
BILIRUB SERPL-MCNC: 0.7 MG/DL (ref 0.2–1.2)
BUN SERPL-MCNC: 15 MG/DL (ref 7–25)
BUN/CREAT SERPL: ABNORMAL (CALC) (ref 6–22)
CALCIUM SERPL-MCNC: 9.1 MG/DL (ref 8.6–10.3)
CHLORIDE SERPL-SCNC: 102 MMOL/L (ref 98–110)
CHOLEST SERPL-MCNC: 164 MG/DL
CHOLEST/HDLC SERPL: 3.8 (CALC)
CO2 SERPL-SCNC: 31 MMOL/L (ref 20–32)
CREAT SERPL-MCNC: 0.9 MG/DL (ref 0.7–1.3)
EOSINOPHIL # BLD AUTO: 301 CELLS/UL (ref 15–500)
EOSINOPHIL NFR BLD AUTO: 4.3 %
ERYTHROCYTE [DISTWIDTH] IN BLOOD BY AUTOMATED COUNT: 12.9 % (ref 11–15)
GFR/BSA.PRED SERPLBLD CYS-BASED-ARV: 102 ML/MIN/1.73M2
GLOBULIN SER CALC-MCNC: 2.3 G/DL (CALC) (ref 1.9–3.7)
GLUCOSE SERPL-MCNC: 90 MG/DL (ref 65–99)
HBA1C MFR BLD: 5.2 % OF TOTAL HGB
HCT VFR BLD AUTO: 47.1 % (ref 38.5–50)
HDLC SERPL-MCNC: 43 MG/DL
HGB BLD-MCNC: 16.2 G/DL (ref 13.2–17.1)
LDLC SERPL CALC-MCNC: 101 MG/DL (CALC)
LYMPHOCYTES # BLD AUTO: 1750 CELLS/UL (ref 850–3900)
LYMPHOCYTES NFR BLD AUTO: 25 %
MCH RBC QN AUTO: 29.5 PG (ref 27–33)
MCHC RBC AUTO-ENTMCNC: 34.4 G/DL (ref 32–36)
MCV RBC AUTO: 85.8 FL (ref 80–100)
MONOCYTES # BLD AUTO: 665 CELLS/UL (ref 200–950)
MONOCYTES NFR BLD AUTO: 9.5 %
NEUTROPHILS # BLD AUTO: 4193 CELLS/UL (ref 1500–7800)
NEUTROPHILS NFR BLD AUTO: 59.9 %
NONHDLC SERPL-MCNC: 121 MG/DL (CALC)
PLATELET # BLD AUTO: 200 THOUSAND/UL (ref 140–400)
PMV BLD REES-ECKER: 10.3 FL (ref 7.5–12.5)
POTASSIUM SERPL-SCNC: 4.3 MMOL/L (ref 3.5–5.3)
PROT SERPL-MCNC: 6.4 G/DL (ref 6.1–8.1)
PSA SERPL-MCNC: 0.37 NG/ML
RBC # BLD AUTO: 5.49 MILLION/UL (ref 4.2–5.8)
SODIUM SERPL-SCNC: 138 MMOL/L (ref 135–146)
TRIGL SERPL-MCNC: 102 MG/DL
TSH SERPL-ACNC: 4.26 MIU/L (ref 0.4–4.5)
WBC # BLD AUTO: 7 THOUSAND/UL (ref 3.8–10.8)

## 2022-09-23 DIAGNOSIS — K21.9 GASTROESOPHAGEAL REFLUX DISEASE, UNSPECIFIED WHETHER ESOPHAGITIS PRESENT: ICD-10-CM

## 2022-09-23 RX ORDER — OMEPRAZOLE 20 MG/1
20 CAPSULE, DELAYED RELEASE ORAL DAILY
Qty: 90 CAPSULE | Refills: 1 | Status: SHIPPED | OUTPATIENT
Start: 2022-09-23

## 2022-10-10 DIAGNOSIS — J30.81 ANIMAL DANDER ALLERGY: ICD-10-CM

## 2022-10-10 DIAGNOSIS — J45.20 MILD INTERMITTENT ASTHMA WITHOUT COMPLICATION: ICD-10-CM

## 2022-10-10 RX ORDER — MONTELUKAST SODIUM 10 MG/1
10 TABLET ORAL
Qty: 90 TABLET | Refills: 1 | Status: SHIPPED | OUTPATIENT
Start: 2022-10-10

## 2022-10-13 ENCOUNTER — TELEPHONE (OUTPATIENT)
Dept: ADMINISTRATIVE | Facility: OTHER | Age: 52
End: 2022-10-13

## 2022-10-13 ENCOUNTER — OFFICE VISIT (OUTPATIENT)
Dept: FAMILY MEDICINE CLINIC | Facility: CLINIC | Age: 52
End: 2022-10-13
Payer: COMMERCIAL

## 2022-10-13 VITALS
OXYGEN SATURATION: 99 % | RESPIRATION RATE: 16 BRPM | HEART RATE: 82 BPM | SYSTOLIC BLOOD PRESSURE: 120 MMHG | DIASTOLIC BLOOD PRESSURE: 80 MMHG | TEMPERATURE: 97 F | WEIGHT: 287 LBS | HEIGHT: 74 IN | BODY MASS INDEX: 36.83 KG/M2

## 2022-10-13 DIAGNOSIS — A09 TRAVELER'S DIARRHEA: Primary | ICD-10-CM

## 2022-10-13 DIAGNOSIS — K52.9 GASTROENTERITIS: ICD-10-CM

## 2022-10-13 PROCEDURE — 99213 OFFICE O/P EST LOW 20 MIN: CPT | Performed by: STUDENT IN AN ORGANIZED HEALTH CARE EDUCATION/TRAINING PROGRAM

## 2022-10-13 RX ORDER — AZITHROMYCIN 250 MG/1
TABLET, FILM COATED ORAL
Qty: 6 TABLET | Refills: 0 | Status: SHIPPED | OUTPATIENT
Start: 2022-10-13 | End: 2022-10-18

## 2022-10-13 NOTE — TELEPHONE ENCOUNTER
----- Message from Mariluz Hopkins sent at 10/13/2022 10:13 AM EDT -----  Regarding: fluvaccine  10/13/22 10:13 AM    Hello, our patient Nadia Pedro has had Immunization(s) completed/performed  Please assist in updating the patient chart by making an External outreach to [employer] located in [address] [place] [address]  The date of service is 2022      Thank you,  Mariluz Hopkins  [facility]

## 2022-10-13 NOTE — PROGRESS NOTES
Clinic Visit Note  Ami Nunez 47 y o  male   MRN: [id number]    Assessment and Plan      Diagnoses and all orders for this visit:    Traveler's diarrhea  Gastroenteritis  Acute diarrhea, water loose bowel movements  Recommend OTC Pepto-Bismol  Can use antibiotic given symptomatology for resolution  No blood, nonacute abdomen, no red flag symptoms  -     azithromycin (Zithromax) 250 mg tablet; Take 2 tablets (500 mg total) by mouth daily for 1 day, THEN 1 tablet (250 mg total) daily for 4 days  My impressions and treatment recommendations were discussed in detail with the patient who verbalized understanding and had no further questions  Discharge instructions were provided  Subjective     Chief Complaint:  Same-day visit    History of Present Illness:    Patient is a pleasant 48-year-old gentleman coming in for same-day visit secondary to acute diarrhea x3 days, about 10-12 loose bowel movements, watery in nature, no blood  No recent travel, no recent medication changes, last week was in [place], eating out multiple nights  The following portions of the patient's history were reviewed and updated as appropriate: allergies, current medications, past family history, past medical history, past social history, past surgical history and problem list     REVIEW OF SYSTEMS:  A complete 12-point review of systems is negative other than that noted in the HPI  Review of Systems   Constitutional: Negative for chills, fatigue and fever  Respiratory: Negative for cough, shortness of breath and wheezing  Cardiovascular: Negative for chest pain, palpitations and leg swelling  Gastrointestinal: Positive for diarrhea  Negative for abdominal distention, abdominal pain, blood in stool, nausea and vomiting           Current Outpatient Medications:   •  albuterol (Ventolin HFA) 90 mcg/act inhaler, Inhale 2 puffs every 6 (six) hours as needed for wheezing, Disp: 18 g, Rfl: 1  •  azithromycin (Zithromax) 250 mg tablet, Take 2 tablets (500 mg total) by mouth daily for 1 day, THEN 1 tablet (250 mg total) daily for 4 days  , Disp: 6 tablet, Rfl: 0  •  montelukast (SINGULAIR) 10 mg tablet, Take 1 tablet (10 mg total) by mouth daily at bedtime, Disp: 90 tablet, Rfl: 1  •  Multiple Vitamins-Minerals (PRESERVISION AREDS 2+MULTI VIT PO), Take by mouth every morning Last dose 6/14/2020, Disp: , Rfl:   •  olopatadine (PATANOL) 0 1 % ophthalmic solution, Administer 1 drop to both eyes 2 (two) times a day, Disp: 5 mL, Rfl: 2  •  omeprazole (PriLOSEC) 20 mg delayed release capsule, Take 1 capsule (20 mg total) by mouth daily, Disp: 90 capsule, Rfl: 1  •  psyllium (METAMUCIL) 0 52 g capsule, Take 0 52 g by mouth, Disp: , Rfl:   Past Medical History:   Diagnosis Date   • Anal fissure 01/16/2004   • Arthritis    • Moe esophagus    • GERD (gastroesophageal reflux disease)    • Hiatal hernia    • Infectious gastroenteritis 12/11/2012   • Morbid obesity with BMI of 40 0-44 9, adult ([facility] )    • Osteoarthritis    • Pilonidal cyst with abscess 01/16/2004    none since 2015   • PONV (postoperative nausea and vomiting)    • Seasonal allergies    • Slipped capital femoral epiphysis     left-surgical 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   • Snores    • Wears glasses      Past Surgical History:   Procedure Laterality Date   • EGD AND COLONOSCOPY  2020   • FRACTURE SURGERY      slipped epiphysis left femur 1893 pins   • KNEE ARTHROSCOPY Right 1997    meniscectomy   • PILONIDAL CYST / SINUS EXCISION     • SLIPPED CAPITAL FEMORAL EPIPHYSIS PINNING Left 06/1983   • TONSILLECTOMY     • VASECTOMY  2008   • WISDOM TOOTH EXTRACTION       Social History     Socioeconomic History   • Marital status: /Civil Union     Spouse name: Not on file   • Number of children: Not on file   • Years of education: Not on file   • Highest education level: Not on file   Occupational History   • Not on file   Tobacco Use   • Smoking status: Never Smoker   • Smokeless tobacco: Never Used   Vaping Use   • Vaping Use: Never used   Substance and Sexual Activity   • Alcohol use: Yes     Comment: Social drinker   • Drug use: No   • Sexual activity: Not on file   Other Topics Concern   • Not on file   Social History Narrative   • Not on file     Social Determinants of Health     Financial Resource Strain: Not on file   Food Insecurity: Not on file   Transportation Needs: Not on file   Physical Activity: Not on file   Stress: Not on file   Social Connections: Not on file   Intimate Partner Violence: Not on file   Housing Stability: Not on file     Family History   Problem Relation Age of Onset   • Depression Mother    • Hypertension Mother    • Diabetes Mother    • Arthritis Mother    • Cancer Mother         breast   • Cancer Father         kidney   • Heart disease Father         CABGx3, CAD, pacemaker   • Bursitis Brother    • Diverticulosis Brother    • No Known Problems Daughter    • No Known Problems Son      Allergies   Allergen Reactions   • Pollen Extract Other (See Comments)     Itchy burning eyes   • Cat Hair Extract      Animal dander-triggers asthma       Objective     Vitals:    10/13/22 0957   BP: 120/80   BP Location: Left arm   Patient Position: Sitting   Cuff Size: Large   Pulse: 82   Resp: 16   Temp: (!) 97 °F (36 1 °C)   SpO2: 99%   Weight: 130 kg (287 lb)   Height: 6' 2" (1 88 m)       Physical Exam:     GENERAL: NAD, pleasant   HEENT:  NC/AT, PERRL, EOMI, no scleral icterus  CARDIAC:  RRR, +S1/S2, no S3/S4 appreciated, no m/g/r  PULMONARY:  CTA B/L, no wheezing/rales/rhonci, non-labored breathing  ABDOMEN:  Soft, NT/ND, no rebound/guarding/rigidity  Extremities:   No edema, cyanosis, or clubbing  Musculoskeletal:  Full range of motion intact in all extremities   NEUROLOGIC: Grossly intact, no focal deficits  SKIN:  No rashes or erythema noted on exposed skin  Psych: Normal affect, mood stable    ==  PLEASE NOTE:  This encounter was completed utilizing the - [employer] Direct Speech Voice Recognition Software  Grammatical errors, random word insertions, pronoun errors and incomplete sentences are occasional consequences of the system due to software limitations, ambient noise and hardware issues  These may be missed by proof reading prior to affixing electronic signature  Any questions or concerns about the content, text or information contained within the body of this dictation should be directly addressed to the physician for clarification  Please do not hesitate to call me directly if you have any any questions or concerns      DO Juan Mahmood 73 Internal Medicine   [facility]

## 2022-10-14 NOTE — TELEPHONE ENCOUNTER
Upon review of the In Basket request we Girish Lima - this pharmacy will not fax information - patient needs to request     Any additional questions or concerns should be emailed to the Practice Liaisons via the appropriate education email address, please do not reply via In Basket      Thank you  Naveed Adhikari

## 2023-03-22 DIAGNOSIS — K21.9 GASTROESOPHAGEAL REFLUX DISEASE, UNSPECIFIED WHETHER ESOPHAGITIS PRESENT: ICD-10-CM

## 2023-03-22 RX ORDER — OMEPRAZOLE 20 MG/1
CAPSULE, DELAYED RELEASE ORAL
Qty: 90 CAPSULE | Refills: 3 | Status: SHIPPED | OUTPATIENT
Start: 2023-03-22

## 2023-04-06 DIAGNOSIS — J45.20 MILD INTERMITTENT ASTHMA WITHOUT COMPLICATION: ICD-10-CM

## 2023-04-06 DIAGNOSIS — J30.81 ANIMAL DANDER ALLERGY: ICD-10-CM

## 2023-04-06 RX ORDER — MONTELUKAST SODIUM 10 MG/1
TABLET ORAL
Qty: 90 TABLET | Refills: 3 | Status: SHIPPED | OUTPATIENT
Start: 2023-04-06

## 2023-06-13 ENCOUNTER — RA CDI HCC (OUTPATIENT)
Dept: OTHER | Facility: HOSPITAL | Age: 53
End: 2023-06-13

## 2023-06-13 NOTE — PROGRESS NOTES
[facility]  coding opportunities          Chart Reviewed number of suggestions sent to Provider: 1   [id number]    Patients Insurance        Commercial Insurance: [employer]

## 2023-06-16 ENCOUNTER — OFFICE VISIT (OUTPATIENT)
Dept: FAMILY MEDICINE CLINIC | Facility: CLINIC | Age: 53
End: 2023-06-16
Payer: COMMERCIAL

## 2023-06-16 VITALS
BODY MASS INDEX: 35.04 KG/M2 | DIASTOLIC BLOOD PRESSURE: 78 MMHG | TEMPERATURE: 98.2 F | OXYGEN SATURATION: 98 % | RESPIRATION RATE: 14 BRPM | HEART RATE: 68 BPM | SYSTOLIC BLOOD PRESSURE: 102 MMHG | HEIGHT: 74 IN | WEIGHT: 273 LBS

## 2023-06-16 DIAGNOSIS — E78.5 DYSLIPIDEMIA: ICD-10-CM

## 2023-06-16 DIAGNOSIS — K21.9 GASTROESOPHAGEAL REFLUX DISEASE, UNSPECIFIED WHETHER ESOPHAGITIS PRESENT: ICD-10-CM

## 2023-06-16 DIAGNOSIS — Z12.5 PROSTATE CANCER SCREENING: ICD-10-CM

## 2023-06-16 DIAGNOSIS — G89.29 CHRONIC BILATERAL LOW BACK PAIN WITHOUT SCIATICA: ICD-10-CM

## 2023-06-16 DIAGNOSIS — H31.103: ICD-10-CM

## 2023-06-16 DIAGNOSIS — Z13.29 THYROID DISORDER SCREENING: ICD-10-CM

## 2023-06-16 DIAGNOSIS — Z23 NEED FOR VACCINE FOR DT (DIPHTHERIA-TETANUS): ICD-10-CM

## 2023-06-16 DIAGNOSIS — Z13.1 DIABETES MELLITUS SCREENING: ICD-10-CM

## 2023-06-16 DIAGNOSIS — M54.50 CHRONIC BILATERAL LOW BACK PAIN WITHOUT SCIATICA: ICD-10-CM

## 2023-06-16 DIAGNOSIS — Z00.00 ANNUAL PHYSICAL EXAM: Primary | ICD-10-CM

## 2023-06-16 DIAGNOSIS — K22.70 BARRETT'S ESOPHAGUS WITHOUT DYSPLASIA: ICD-10-CM

## 2023-06-16 PROBLEM — M54.9 CHRONIC BACK PAIN GREATER THAN 3 MONTHS DURATION: Status: RESOLVED | Noted: 2018-10-17 | Resolved: 2023-06-16

## 2023-06-16 PROBLEM — L29.0 ANAL ITCHING: Status: RESOLVED | Noted: 2020-03-12 | Resolved: 2023-06-16

## 2023-06-16 PROBLEM — R74.01 ELEVATED TRANSAMINASE LEVEL: Status: RESOLVED | Noted: 2020-07-22 | Resolved: 2023-06-16

## 2023-06-16 PROCEDURE — 90715 TDAP VACCINE 7 YRS/> IM: CPT | Performed by: STUDENT IN AN ORGANIZED HEALTH CARE EDUCATION/TRAINING PROGRAM

## 2023-06-16 PROCEDURE — 99396 PREV VISIT EST AGE 40-64: CPT | Performed by: STUDENT IN AN ORGANIZED HEALTH CARE EDUCATION/TRAINING PROGRAM

## 2023-06-16 PROCEDURE — 90471 IMMUNIZATION ADMIN: CPT | Performed by: STUDENT IN AN ORGANIZED HEALTH CARE EDUCATION/TRAINING PROGRAM

## 2023-06-16 NOTE — PROGRESS NOTES
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    Dyslipidemia    Relevant Orders    Lipid panel   Other Visit Diagnoses     Annual physical exam    -  Primary    Relevant Orders    TDAP VACCINE GREATER THAN OR EQUAL TO 8YO IM (Completed)    Comprehensive metabolic panel    CBC and differential    Need for vaccine for DT (diphtheria-tetanus)        Relevant Orders    TDAP VACCINE GREATER THAN OR EQUAL TO 8YO IM (Completed)    Chronic bilateral low back pain without sciatica        Relevant Orders    Ambulatory Referral to Physical Therapy    Thyroid disorder screening        Relevant Orders    TSH, 3rd generation with Free T4 reflex    Prostate cancer screening        Relevant Orders    PSA, total and free    Diabetes mellitus screening        Relevant Orders    Hemoglobin A1C With EAG        Physical exam completed in office today, follow-up yearly blood work, follow-up physical therapy, Moe's esophagus screening with gastroenterology  Recommended follow-up 1 year  Immunizations and preventive care screenings were discussed with patient today  Appropriate education was printed on patient's after visit summary  Discussed risks and benefits of prostate cancer screening  We discussed the controversial history of PSA screening for prostate cancer in the [place] as well as the risk of over detection and over treatment of prostate cancer by way of PSA screening    The patient understands that PSA blood testing is an imperfect way to screen for prostate cancer and that elevated PSA levels in the blood may also be caused by infection, inflammation, prostatic trauma or manipulation, urological procedures, or by benign prostatic enlargement  The role of the digital rectal examination in prostate cancer screening was also discussed and I discussed with him that there is large interobserver variability in the findings of digital rectal examination  Counseling:  Alcohol/drug use: discussed moderation in alcohol intake, the recommendations for healthy alcohol use, and avoidance of illicit drug use  Dental Health: discussed importance of regular tooth brushing, flossing, and dental visits  Injury prevention: discussed safety/seat belts, safety helmets, smoke detectors, carbon dioxide detectors, and smoking near bedding or upholstery  Sexual health: discussed sexually transmitted diseases, partner selection, use of condoms, avoidance of unintended pregnancy, and contraceptive alternatives  Exercise: the importance of regular exercise/physical activity was discussed  Recommend exercise 3-5 times per week for at least 30 minutes  BMI Counseling: Body mass index is 35 05 kg/m²  The BMI is above normal  Nutrition recommendations include decreasing portion sizes, encouraging healthy choices of fruits and vegetables and moderation in carbohydrate intake  Exercise recommendations include moderate physical activity 150 minutes/week  Patient referred to PCP  Rationale for BMI follow-up plan is due to patient being overweight or obese  Depression Screening and Follow-up Plan: Patient was screened for depression during today's encounter  They screened negative with a PHQ-2 score of 0  No follow-ups on file  Chief Complaint:     Chief Complaint   Patient presents with   • Physical Exam     See's eye dr regularly refused eye exam poor vision      History of Present Illness:     Adult Annual Physical   Patient here for a comprehensive physical exam  The patient reports no problems      Diet and Physical Activity  Diet/Nutrition: well balanced diet  Exercise: 3-4 times a week on average  Depression Screening  PHQ-2/9 Depression Screening    Little interest or pleasure in doing things: 0 - not at all  Feeling down, depressed, or hopeless: 0 - not at all  PHQ-2 Score: 0  PHQ-2 Interpretation: Negative depression screen       General Health  Sleep: sleeps well  Hearing: normal - bilateral   Vision: follows opthal    Dental: regular dental visits   Health  Symptoms include: none     Review of Systems:     Review of Systems   Constitutional: Negative for chills, fatigue and fever  HENT: Negative for congestion and sore throat  Eyes: Negative for pain and visual disturbance  Respiratory: Negative for shortness of breath and wheezing  Cardiovascular: Negative for chest pain and palpitations  Gastrointestinal: Negative for abdominal pain, constipation, diarrhea, nausea and vomiting  Genitourinary: Negative for dysuria and frequency  Musculoskeletal: Positive for back pain  Negative for neck pain  Skin: Negative for color change and rash  Neurological: Negative for dizziness and headaches  Psychiatric/Behavioral: Negative for agitation and confusion  All other systems reviewed and are negative       Past Medical History:     Past Medical History:   Diagnosis Date   • Anal fissure 01/16/2004   • Arthritis    • Moe esophagus    • GERD (gastroesophageal reflux disease)    • Hiatal hernia    • Infectious gastroenteritis 12/11/2012   • Morbid obesity with BMI of 40 0-44 9, adult ([facility] )    • Osteoarthritis    • Pilonidal cyst with abscess 01/16/2004    none since 2015   • PONV (postoperative nausea and vomiting)    • Seasonal allergies    • Slipped capital femoral epiphysis     left-surgical 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   • Snores    • Wears glasses       Past Surgical History:     Past Surgical History:   Procedure Laterality Date   • EGD AND COLONOSCOPY  2020   • FRACTURE SURGERY slipped epiphysis left femur 1893 pins   • KNEE ARTHROSCOPY Right 1997    meniscectomy   • PILONIDAL CYST / SINUS EXCISION     • SLIPPED CAPITAL FEMORAL EPIPHYSIS PINNING Left 06/1983   • TONSILLECTOMY     • VASECTOMY  2008   • WISDOM TOOTH EXTRACTION        Family History:     Family History   Problem Relation Age of Onset   • Depression Mother    • Hypertension Mother    • Diabetes Mother    • Arthritis Mother    • Cancer Mother         breast   • Cancer Father         kidney   • Heart disease Father         CABGx3, CAD, pacemaker   • Bursitis Brother    • Diverticulosis Brother    • No Known Problems Daughter    • No Known Problems Son       Social History:     Social History     Socioeconomic History   • Marital status: /Civil Union     Spouse name: None   • Number of children: None   • Years of education: None   • Highest education level: None   Occupational History   • None   Tobacco Use   • Smoking status: Never   • Smokeless tobacco: Never   Vaping Use   • Vaping Use: Never used   Substance and Sexual Activity   • Alcohol use: Yes     Comment: Social drinker   • Drug use: No   • Sexual activity: None   Other Topics Concern   • None   Social History Narrative   • None     Social Determinants of Health     Financial Resource Strain: Not on file   Food Insecurity: Not on file   Transportation Needs: Not on file   Physical Activity: Not on file   Stress: Not on file   Social Connections: Not on file   Intimate Partner Violence: Not on file   Housing Stability: Not on file      Current Medications:     Current Outpatient Medications   Medication Sig Dispense Refill   • albuterol (Ventolin HFA) 90 mcg/act inhaler Inhale 2 puffs every 6 (six) hours as needed for wheezing 18 g 1   • montelukast (SINGULAIR) 10 mg tablet TAKE 1 TABLET DAILY AT BEDTIME 90 tablet 3   • Multiple Vitamins-Minerals (PRESERVISION AREDS 2+MULTI VIT PO) Take by mouth every morning Last dose 6/14/2020     • olopatadine (PATANOL) 0 1 % "ophthalmic solution Administer 1 drop to both eyes 2 (two) times a day 5 mL 2   • omeprazole (PriLOSEC) 20 mg delayed release capsule TAKE 1 CAPSULE DAILY 90 capsule 3   • psyllium (METAMUCIL) 0 52 g capsule Take 0 52 g by mouth       No current facility-administered medications for this visit  Allergies: Allergies   Allergen Reactions   • Pollen Extract Other (See Comments)     Itchy burning eyes   • Cat Hair Extract      Animal dander-triggers asthma      Physical Exam:     /78 (BP Location: Left arm, Patient Position: Sitting, Cuff Size: Adult)   Pulse 68   Temp 98 2 °F (36 8 °C) (Temporal)   Resp 14   Ht 6' 2\" (1 88 m)   Wt 124 kg (273 lb)   SpO2 98%   BMI 35 05 kg/m²     Physical Exam  Constitutional:       General: He is not in acute distress  HENT:      Head: Normocephalic and atraumatic  Eyes:      General: No scleral icterus  Conjunctiva/sclera: Conjunctivae normal    Cardiovascular:      Rate and Rhythm: Normal rate and regular rhythm  Pulses: Normal pulses  Heart sounds: No murmur heard  Pulmonary:      Effort: Pulmonary effort is normal  No respiratory distress  Breath sounds: Normal breath sounds  No wheezing or rales  Abdominal:      General: Bowel sounds are normal  There is no distension  Palpations: Abdomen is soft  Tenderness: There is no abdominal tenderness  There is no guarding  Musculoskeletal:         General: No swelling  Normal range of motion  Skin:     General: Skin is warm and dry  Capillary Refill: Capillary refill takes less than 2 seconds  Neurological:      General: No focal deficit present  Mental Status: He is alert and oriented to person, place, and time  Mental status is at baseline     Psychiatric:         Mood and Affect: Mood normal          Behavior: Behavior normal           Ghulam Barry DO  [address]  "

## 2023-07-07 ENCOUNTER — EVALUATION (OUTPATIENT)
Dept: PHYSICAL THERAPY | Facility: CLINIC | Age: 53
End: 2023-07-07
Payer: COMMERCIAL

## 2023-07-07 DIAGNOSIS — M54.16 LUMBAR RADICULOPATHY: Primary | ICD-10-CM

## 2023-07-07 PROCEDURE — 97161 PT EVAL LOW COMPLEX 20 MIN: CPT | Performed by: PHYSICAL THERAPIST

## 2023-07-07 NOTE — PROGRESS NOTES
PT Evaluation     Today's date: 2023  Patient name: Shanell Michelle  : 1970  MRN: [id number]  Referring provider: Jose Liu*  Dx:   Encounter Diagnosis     ICD-10-CM    1. Lumbar radiculopathy  M54.16                      Assessment  Assessment details: Holley Angelucci Butlerpresents with signs and symptoms consistent with Lumbar radiculopathy  (primary encounter diagnosis), with loss of range of motion, strength and spinal stabilization. Presents with high reactivity. Shanell Michelle would benefit with physical therapy to address these impairments to return to prior level of function. Impairments: abnormal or restricted ROM, activity intolerance, impaired balance, impaired physical strength, lacks appropriate home exercise program and pain with function    Goals  STG  Initiate HEP  Decrease pain by 50% in 3 weeks  Patient performing HEP 50% of time in 3 weeks  LTG  Independent with HEP  Decrease pain by 90% in 6 weeks  Patient performing HEP 90% of time in 6 weeks  FOTO > 60    in 6 weeks    Plan  Planned therapy interventions: neuromuscular re-education, balance, patient education, strengthening, stretching, therapeutic exercise and home exercise program  Frequency: 2x week  Duration in visits: 12  Duration in weeks: 6  Treatment plan discussed with: patient        Subjective Evaluation    History of Present Illness  Mechanism of injury: Patient reports decades of back pain, overtime my mobility is decreasing, episodes of sciatica. Has had PT in past and has helped. He has a primary desk work. Walk 5-6 miles per day, and bikes 2-3 days per week. He wants to become more fit with less pain.             Recurrent probem    Quality of life: good    Pain  Current pain rating: 3  At best pain ratin  At worst pain ratin  Location: central lower back  Quality: dull ache, knife-like and discomfort  Relieving factors: change in position and medications  Aggravating factors: sitting and keyboarding    Treatments  Current treatment: physical therapy  Patient Goals  Patient goals for therapy: decreased pain, increased motion, increased strength, independence with ADLs/IADLs and return to sport/leisure activities          Objective     Active Range of Motion     Lumbar   Flexion: 40 degrees  with pain  Extension: 20 degrees  with pain  Left rotation: 70 degrees   Right rotation: 70 degrees              Precautions: Medical History    Diagnosis Date Comment Source   Anal fissure 01/16/2004     Arthritis      Moe esophagus      GERD (gastroesophageal reflux disease)      Hiatal hernia      Infectious gastroenteritis 12/11/2012     Morbid obesity with BMI of 40.0-44.9, adult ([address] [facility])      Osteoarthritis      Pilonidal cyst with abscess 01/16/2004 none since 2015    PONV (postoperative nausea and vomiting)      Seasonal allergies      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    Snores      Wears glasses              Manuals                                                                 Neuro Re-Ed 7/7            Neurac supine pelvic lift 2x3            Neurac bridge 2x3            Neurac SDLY hip ADD/ABD 2x3  2x3            Neurac plank             Neurac cerv retract supine             Neurac scap retract supine             Neurac sh flex kneel             Ther Ex                                                                                                                     Ther Activity                                       Gait Training                                       Modalities

## 2023-07-14 ENCOUNTER — OFFICE VISIT (OUTPATIENT)
Dept: PHYSICAL THERAPY | Facility: CLINIC | Age: 53
End: 2023-07-14
Payer: COMMERCIAL

## 2023-07-14 DIAGNOSIS — M54.16 LUMBAR RADICULOPATHY: Primary | ICD-10-CM

## 2023-07-14 PROCEDURE — 97112 NEUROMUSCULAR REEDUCATION: CPT | Performed by: PHYSICAL THERAPIST

## 2023-07-14 NOTE — PROGRESS NOTES
Daily Note     Today's date: 2023  Patient name: Rosalbarsparkle Trimble  : 1970  MRN: [id number]  Referring provider: Tash Garces*  Dx:   Encounter Diagnosis     ICD-10-CM    1. Lumbar radiculopathy  M54.16                      Subjective: I am sore, but not in pain. Objective: See treatment diary below      Assessment: Tolerated treatment well. Patient demonstrated fatigue post treatment and exhibited good technique with therapeutic exercises      Plan: Continue per plan of care.       Precautions: Medical History    Diagnosis Date Comment Source   Anal fissure 2004     Arthritis      Moe esophagus      GERD (gastroesophageal reflux disease)      Hiatal hernia      Infectious gastroenteritis 2012     Morbid obesity with BMI of 40.0-44.9, adult ([address] )      Osteoarthritis      Pilonidal cyst with abscess 2004 none since     PONV (postoperative nausea and vomiting)      Seasonal allergies      Slipped capital femoral epiphysis  left-surgical correction 1983    Snores      Wears glasses              Manuals                                                                 Neuro Re-Ed            Neurac supine pelvic lift 2x3 2x5           Neurac bridge 2x3 2x5           Neurac SDLY hip ADD/ABD 2x3  2x3 2x5  2x5           Neurac plank  2x5           Neurac cerv retract supine  2x5           Neurac scap retract supine  2x5           Neurac sh flex kneel             Ther Ex                                                                                                                     Ther Activity                                       Gait Training                                       Modalities

## 2023-07-18 ENCOUNTER — OFFICE VISIT (OUTPATIENT)
Dept: PHYSICAL THERAPY | Facility: CLINIC | Age: 53
End: 2023-07-18
Payer: COMMERCIAL

## 2023-07-18 DIAGNOSIS — M54.16 LUMBAR RADICULOPATHY: Primary | ICD-10-CM

## 2023-07-18 PROCEDURE — 97112 NEUROMUSCULAR REEDUCATION: CPT | Performed by: PHYSICAL THERAPIST

## 2023-07-18 NOTE — PROGRESS NOTES
Daily Note     Today's date: 2023  Patient name: Javier Montiel  : 1970  MRN: [id number]  Referring provider: Lakeshia Jones*  Dx:   Encounter Diagnosis     ICD-10-CM    1. Lumbar radiculopathy  M54.16                      Subjective: The pain and stiffness is more intense. Objective: See treatment diary below      Assessment: Tolerated treatment well. Patient demonstrated fatigue post treatment and exhibited good technique with therapeutic exercises      Plan: Continue per plan of care.       Precautions: Medical History    Diagnosis Date Comment Source   Anal fissure 2004     Arthritis      Moe esophagus      GERD (gastroesophageal reflux disease)      Hiatal hernia      Infectious gastroenteritis 2012     Morbid obesity with BMI of 40.0-44.9, adult ([address])      Osteoarthritis      Pilonidal cyst with abscess 2004 none since     PONV (postoperative nausea and vomiting)      Seasonal allergies      Slipped capital femoral epiphysis  left-surgical correction 1983    Snores      Wears glasses              Manuals                                                                 Neuro Re-Ed           Neurac supine pelvic lift 2x3 2x5 2x5  Stimula          Neurac bridge 2x3 2x5 2x5  stimula          Neurac SDLY hip ADD/ABD 2x3  2x3 2x5  2x5 2x5  2x5          Neurac plank  2x5           Neurac cerv retract supine  2x5           Neurac scap retract supine  2x5           Neurac sh flex kneel             Ther Ex                                                                                                                     Ther Activity                                       Gait Training                                       Modalities

## 2023-07-21 ENCOUNTER — APPOINTMENT (OUTPATIENT)
Dept: PHYSICAL THERAPY | Facility: CLINIC | Age: 53
End: 2023-07-21
Payer: COMMERCIAL

## 2023-07-22 LAB
ALBUMIN SERPL-MCNC: 4.4 G/DL (ref 3.6–5.1)
ALBUMIN/GLOB SERPL: 1.8 (CALC) (ref 1–2.5)
ALP SERPL-CCNC: 67 U/L (ref 35–144)
ALT SERPL-CCNC: 47 U/L (ref 9–46)
AST SERPL-CCNC: 40 U/L (ref 10–35)
BASOPHILS # BLD AUTO: 70 CELLS/UL (ref 0–200)
BASOPHILS NFR BLD AUTO: 1 %
BILIRUB SERPL-MCNC: 0.6 MG/DL (ref 0.2–1.2)
BUN SERPL-MCNC: 22 MG/DL (ref 7–25)
BUN/CREAT SERPL: ABNORMAL (CALC) (ref 6–22)
CALCIUM SERPL-MCNC: 9.4 MG/DL (ref 8.6–10.3)
CHLORIDE SERPL-SCNC: 103 MMOL/L (ref 98–110)
CHOLEST SERPL-MCNC: 178 MG/DL
CHOLEST/HDLC SERPL: 4.2 (CALC)
CO2 SERPL-SCNC: 28 MMOL/L (ref 20–32)
CREAT SERPL-MCNC: 0.93 MG/DL (ref 0.7–1.3)
EOSINOPHIL # BLD AUTO: 280 CELLS/UL (ref 15–500)
EOSINOPHIL NFR BLD AUTO: 4 %
ERYTHROCYTE [DISTWIDTH] IN BLOOD BY AUTOMATED COUNT: 12.8 % (ref 11–15)
EST. AVERAGE GLUCOSE BLD GHB EST-MCNC: 103 MG/DL
EST. AVERAGE GLUCOSE BLD GHB EST-SCNC: 5.7 MMOL/L
GFR/BSA.PRED SERPLBLD CYS-BASED-ARV: 98 ML/MIN/1.73M2
GLOBULIN SER CALC-MCNC: 2.5 G/DL (CALC) (ref 1.9–3.7)
GLUCOSE SERPL-MCNC: 97 MG/DL (ref 65–99)
HBA1C MFR BLD: 5.2 % OF TOTAL HGB
HCT VFR BLD AUTO: 46.6 % (ref 38.5–50)
HDLC SERPL-MCNC: 42 MG/DL
HGB BLD-MCNC: 16 G/DL (ref 13.2–17.1)
LDLC SERPL CALC-MCNC: 118 MG/DL (CALC)
LYMPHOCYTES # BLD AUTO: 1911 CELLS/UL (ref 850–3900)
LYMPHOCYTES NFR BLD AUTO: 27.3 %
MCH RBC QN AUTO: 29.8 PG (ref 27–33)
MCHC RBC AUTO-ENTMCNC: 34.3 G/DL (ref 32–36)
MCV RBC AUTO: 86.8 FL (ref 80–100)
MONOCYTES # BLD AUTO: 539 CELLS/UL (ref 200–950)
MONOCYTES NFR BLD AUTO: 7.7 %
NEUTROPHILS # BLD AUTO: 4200 CELLS/UL (ref 1500–7800)
NEUTROPHILS NFR BLD AUTO: 60 %
NONHDLC SERPL-MCNC: 136 MG/DL (CALC)
PLATELET # BLD AUTO: 206 THOUSAND/UL (ref 140–400)
PMV BLD REES-ECKER: 10.6 FL (ref 7.5–12.5)
POTASSIUM SERPL-SCNC: 4.2 MMOL/L (ref 3.5–5.3)
PROT SERPL-MCNC: 6.9 G/DL (ref 6.1–8.1)
PSA FREE MFR SERPL: 43 % (CALC)
PSA FREE SERPL-MCNC: 0.3 NG/ML
PSA SERPL-MCNC: 0.7 NG/ML
RBC # BLD AUTO: 5.37 MILLION/UL (ref 4.2–5.8)
SODIUM SERPL-SCNC: 138 MMOL/L (ref 135–146)
TRIGL SERPL-MCNC: 83 MG/DL
TSH SERPL-ACNC: 4.2 MIU/L (ref 0.4–4.5)
WBC # BLD AUTO: 7 THOUSAND/UL (ref 3.8–10.8)

## 2023-07-24 ENCOUNTER — OFFICE VISIT (OUTPATIENT)
Dept: PHYSICAL THERAPY | Facility: CLINIC | Age: 53
End: 2023-07-24
Payer: COMMERCIAL

## 2023-07-24 DIAGNOSIS — M54.16 LUMBAR RADICULOPATHY: Primary | ICD-10-CM

## 2023-07-24 PROCEDURE — 97112 NEUROMUSCULAR REEDUCATION: CPT | Performed by: PHYSICAL THERAPIST

## 2023-07-24 NOTE — PROGRESS NOTES
Daily Note     Today's date: 2023  Patient name: Rosalbarsparkle Trimble  : 1970  MRN: [id number]  Referring provider: Tash Garces*  Dx:   Encounter Diagnosis     ICD-10-CM    1. Lumbar radiculopathy  M54.16                      Subjective: Adding vibration to the [place] exercise helps me tolerate the exercise. Objective: See treatment diary below      Assessment: Tolerated treatment well. Patient demonstrated fatigue post treatment and exhibited good technique with therapeutic exercises      Plan: Continue per plan of care.       Precautions: Medical History    Diagnosis Date Comment Source   Anal fissure 2004     Arthritis      Moe esophagus      GERD (gastroesophageal reflux disease)      Hiatal hernia      Infectious gastroenteritis 2012     Morbid obesity with BMI of 40.0-44.9, adult ([address])      Osteoarthritis      Pilonidal cyst with abscess 2004 none since     PONV (postoperative nausea and vomiting)      Seasonal allergies      Slipped capital femoral epiphysis  left-surgical correction 1983    Snores      Wears glasses              Manuals                                                                 Neuro Re-Ed          Neurac supine pelvic lift 2x3 2x5 2x5  Stimula 2x5 stimula 30s hold         Neurac bridge 2x3 2x5 2x5  stimula 2x5 stim  30 s hold         Neurac SDLY hip ADD/ABD 2x3  2x3 2x5  2x5 2x5  2x5 2x5 stim   7 sec hold         Neurac plank  2x5           Neurac cerv retract supine  2x5  2x5 stim 10 sec hold         Neurac scap retract supine  2x5  2x5 stim 12 s hold         Neurac sh flex kneel             Ther Ex                                                                                                                     Ther Activity                                       Gait Training                                       Modalities

## 2023-08-01 ENCOUNTER — OFFICE VISIT (OUTPATIENT)
Dept: PHYSICAL THERAPY | Facility: CLINIC | Age: 53
End: 2023-08-01
Payer: COMMERCIAL

## 2023-08-01 DIAGNOSIS — M54.16 LUMBAR RADICULOPATHY: Primary | ICD-10-CM

## 2023-08-01 PROCEDURE — 97112 NEUROMUSCULAR REEDUCATION: CPT | Performed by: PHYSICAL THERAPIST

## 2023-08-01 NOTE — PROGRESS NOTES
Daily Note     Today's date: 2023  Patient name: Charley Sher  : 1970  MRN: [id number]  Referring provider: Stu Singer*  Dx:   Encounter Diagnosis     ICD-10-CM    1. Lumbar radiculopathy  M54.16                      Subjective: I am feeling stronger with less pain    Objective: See treatment diary below      Assessment: Tolerated treatment well. Patient demonstrated fatigue post treatment and exhibited good technique with therapeutic exercises      Plan: Continue per plan of care.       Precautions: Medical History    Diagnosis Date Comment Source   Anal fissure 2004     Arthritis      Moe esophagus      GERD (gastroesophageal reflux disease)      Hiatal hernia      Infectious gastroenteritis 2012     Morbid obesity with BMI of 40.0-44.9, adult ([address] [facility])      Osteoarthritis      Pilonidal cyst with abscess 2004 none since     PONV (postoperative nausea and vomiting)      Seasonal allergies      Slipped capital femoral epiphysis  left-surgical correction 1983    Snores      Wears glasses              Manuals                                                                 Neuro Re-Ed         Neurac supine pelvic lift 2x3 2x5 2x5  Stimula 2x5 stimula 30s hold 2x5  Stimula  40s        Neurac bridge 2x3 2x5 2x5  stimula 2x5 stim  30 s hold 2x5  Stim 30s        Neurac SDLY hip ADD/ABD 2x3  2x3 2x5  2x5 2x5  2x5 2x5 stim   7 sec hold 2x5  20s        Neurac plank  2x5           Neurac cerv retract supine  2x5  2x5 stim 10 sec hold 2x5        Neurac scap retract supine  2x5  2x5 stim 12 s hold 2x5        Neurac sh flex kneel     2x5        Ther Ex                                                                                                                     Ther Activity                                       Gait Training                                       Modalities

## 2023-08-04 ENCOUNTER — OFFICE VISIT (OUTPATIENT)
Dept: PHYSICAL THERAPY | Facility: CLINIC | Age: 53
End: 2023-08-04
Payer: COMMERCIAL

## 2023-08-04 DIAGNOSIS — M54.16 LUMBAR RADICULOPATHY: Primary | ICD-10-CM

## 2023-08-04 PROCEDURE — 97112 NEUROMUSCULAR REEDUCATION: CPT | Performed by: PHYSICAL THERAPIST

## 2023-08-04 NOTE — PROGRESS NOTES
Daily Note     Today's date: 2023  Patient name: Zulma David  : 1970  MRN: [id number]  Referring provider: Max Phelps  Dx:   Encounter Diagnosis     ICD-10-CM    1. Lumbar radiculopathy  M54.16                      Subjective: I am getting stronger, less pain intensity. Objective: See treatment diary below      Assessment: Tolerated treatment well. Patient demonstrated fatigue post treatment and exhibited good technique with therapeutic exercises      Plan: Continue per plan of care.       Precautions: Medical History    Diagnosis Date Comment Source   Anal fissure 2004     Arthritis      Moe esophagus      GERD (gastroesophageal reflux disease)      Hiatal hernia      Infectious gastroenteritis 2012     Morbid obesity with BMI of 40.0-44.9, adult ([address] [facility])      Osteoarthritis      Pilonidal cyst with abscess 2004 none since     PONV (postoperative nausea and vomiting)      Seasonal allergies      Slipped capital femoral epiphysis  left-surgical correction 1983    Snores      Wears glasses              Manuals                                                                 Neuro Re-Ed        Neurac supine pelvic lift 2x3 2x5 2x5  Stimula 2x5 stimula 30s hold 2x5  Stimula  40s 2x5       Neurac bridge 2x3 2x5 2x5  stimula 2x5 stim  30 s hold 2x5  Stim 30s 2x5       Neurac SDLY hip ADD/ABD 2x3  2x3 2x5  2x5 2x5  2x5 2x5 stim   7 sec hold 2x5  20s 2x5       Neurac plank  2x5           Neurac cerv retract supine  2x5  2x5 stim 10 sec hold 2x5 2x5       Neurac scap retract supine  2x5  2x5 stim 12 s hold 2x5 2x5       Neurac sh flex kneel     2x5 2x5       Ther Ex                                                                                                                     Ther Activity                                       Gait Training                                       Modalities

## 2023-08-15 ENCOUNTER — OFFICE VISIT (OUTPATIENT)
Dept: PHYSICAL THERAPY | Facility: CLINIC | Age: 53
End: 2023-08-15
Payer: COMMERCIAL

## 2023-08-15 DIAGNOSIS — M54.16 LUMBAR RADICULOPATHY: Primary | ICD-10-CM

## 2023-08-15 PROCEDURE — 97112 NEUROMUSCULAR REEDUCATION: CPT | Performed by: PHYSICAL THERAPIST

## 2023-08-15 NOTE — PROGRESS NOTES
Daily Note     Today's date: 8/15/2023  Patient name: Dejuan Wilson  : 1970  MRN: [id number]  Referring provider: Ceci Nettles*  Dx:   Encounter Diagnosis     ICD-10-CM    1. Lumbar radiculopathy  M54.16                      Subjective: Overall less pain      Objective: See treatment diary below      Assessment: Tolerated treatment well. Patient demonstrated fatigue post treatment and exhibited good technique with therapeutic exercises      Plan: Continue per plan of care.       Precautions: Medical History    Diagnosis Date Comment Source   Anal fissure 2004     Arthritis      Moe esophagus      GERD (gastroesophageal reflux disease)      Hiatal hernia      Infectious gastroenteritis 2012     Morbid obesity with BMI of 40.0-44.9, adult ([facility])      Osteoarthritis      Pilonidal cyst with abscess 2004 none since     PONV (postoperative nausea and vomiting)      Seasonal allergies      Slipped capital femoral epiphysis  left-surgical correction 1983    Snores      Wears glasses              Manuals                                                                 Neuro Re-Ed 7/7 7/14 7/18 7/24 8/1 8/4 8/15      Neurac supine pelvic lift 2x3 2x5 2x5  Stimula 2x5 stimula 30s hold 2x5  Stimula  40s 2x5 2x5      Neurac bridge 2x3 2x5 2x5  stimula 2x5 stim  30 s hold 2x5  Stim 30s 2x5 2x5      Neurac SDLY hip ADD/ABD 2x3  2x3 2x5  2x5 2x5  2x5 2x5 stim   7 sec hold 2x5  20s 2x5 2x5      Neurac plank  2x5           Neurac cerv retract supine  2x5  2x5 stim 10 sec hold 2x5 2x5 2x5      Neurac scap retract supine  2x5  2x5 stim 12 s hold 2x5 2x5 2x5      Neurac sh flex kneel     2x5 2x5 2x5      Ther Ex                                                                                                                     Ther Activity                                       Gait Training                                       Modalities

## 2023-08-16 ENCOUNTER — CONSULT (OUTPATIENT)
Dept: GASTROENTEROLOGY | Facility: CLINIC | Age: 53
End: 2023-08-16
Payer: COMMERCIAL

## 2023-08-16 VITALS
HEART RATE: 69 BPM | BODY MASS INDEX: 36.47 KG/M2 | HEIGHT: 74 IN | WEIGHT: 284.2 LBS | DIASTOLIC BLOOD PRESSURE: 84 MMHG | SYSTOLIC BLOOD PRESSURE: 130 MMHG

## 2023-08-16 DIAGNOSIS — K44.9 HIATAL HERNIA: ICD-10-CM

## 2023-08-16 DIAGNOSIS — K21.9 GASTROESOPHAGEAL REFLUX DISEASE WITHOUT ESOPHAGITIS: ICD-10-CM

## 2023-08-16 DIAGNOSIS — K22.70 BARRETT'S ESOPHAGUS WITHOUT DYSPLASIA: Primary | ICD-10-CM

## 2023-08-16 PROCEDURE — 99204 OFFICE O/P NEW MOD 45 MIN: CPT | Performed by: INTERNAL MEDICINE

## 2023-08-16 NOTE — PROGRESS NOTES
Answers for HPI/ROS submitted by the patient on 8/15/2023  Progression since onset: resolved  anorexia: No  arthralgias: Yes  belching: No  constipation: No  diarrhea: No  dysuria: No  fever: No  flatus: No  frequency: No  headaches: No  hematochezia: No  hematuria: No  melena: No  myalgias: No  nausea: No  weight loss: No  vomiting: No    [facility] - Outpatient Consultation  Shanell Michelle 47 y.o. male MRN: [id number]  Encounter: [id number]          ASSESSMENT AND PLAN:      1. Moe's esophagus without dysplasia  47-year male referred to us chronic GERD and Moe esophagus, patient had endoscopy 3 years ago which shows evidence of nondysplastic Moe esophagus, 4 cm long segment Moe esophagus along with small hiatal hernia. He is taking omeprazole 20 mg p.o. daily, he is non-smoker, he is morbidly obese and has a strong family history of esophageal cancer, he is high risk up Moe's turning into cancer. I discussed about endoscopic surveillance and schedule for repeat endoscopy, risk and benefit of the procedure was discussed, we will schedule it  - EGD; Future    2. Gastroesophageal reflux disease without esophagitis  Continue with omeprazole 20 mg p.o. daily, will schedule for EGD, information about Moe esophagus and a hiatal hernia was provided  - EGD; Future    3. Hiatal hernia  Small hiatal hernia    4. Up-to-date with screening colonoscopy which was done in 2020, colonoscopy came back normal, repeat colonoscopy in 10 years, patient is taking psyllium fiber every day    ______________________________________________________________________    HPI: 48-year-old male referred to us for chronic GERD and history of Moe esophagus. Patient had upper endoscopy done in 2020 which shows 4 cm segment of nondysplastic Moe esophagus with small hiatal hernia.   He has a chronic heartburn, he denying any dysphagia or odynophagia, he is non-smoker, he is morbidly obese, he has a family member with esophageal cancer. He is taking omeprazole 20 mg p.o. daily which is helping him with reflux symptoms, he is up-to-date with screening colonoscopy also, he denying any surgery, no family history of colorectal cancer. Discussed about Moe esophagus and high risk of future and progressively of the cancer. He has multiple question about Moe treatment plan which was reviewed with the patient including radiofrequency ablation. He had a question about hiatal hernia repair surgery also which was reviewed also with the patient      REVIEW OF SYSTEMS:    CONSTITUTIONAL: Denies any fever, chills, rigors, and weight loss. HEENT: No earache or tinnitus. Denies hearing loss or visual disturbances. CARDIOVASCULAR: No chest pain or palpitations. RESPIRATORY: Denies any cough, hemoptysis, shortness of breath or dyspnea on exertion. GASTROINTESTINAL: As noted in the History of Present Illness. GENITOURINARY: No problems with urination. Denies any hematuria or dysuria. NEUROLOGIC: No dizziness or vertigo, denies headaches. MUSCULOSKELETAL: Denies any muscle or joint pain. SKIN: Denies skin rashes or itching. ENDOCRINE: Denies excessive thirst. Denies intolerance to heat or cold. PSYCHOSOCIAL: Denies depression or anxiety. Denies any recent memory loss.        Historical Information   Past Medical History:   Diagnosis Date   • Anal fissure 01/16/2004   • Arthritis    • Moe esophagus    • GERD (gastroesophageal reflux disease)    • Hiatal hernia    • Infectious gastroenteritis 12/11/2012   • Morbid obesity with BMI of 40.0-44.9, adult ([address] [facility])    • Osteoarthritis    • Pilonidal cyst with abscess 01/16/2004    none since 2015   • PONV (postoperative nausea and vomiting)    • Seasonal allergies    • Slipped capital femoral epiphysis     left-surgical 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   • Snores    • Wears glasses      Past Surgical History:   Procedure Laterality Date   • EGD AND COLONOSCOPY 2020   • FRACTURE SURGERY      slipped epiphysis left femur 1893 pins   • KNEE ARTHROSCOPY Right 1997    meniscectomy   • PILONIDAL CYST / SINUS EXCISION     • SLIPPED CAPITAL FEMORAL EPIPHYSIS PINNING Left 06/1983   • TONSILLECTOMY     • VASECTOMY  2008   • WISDOM TOOTH EXTRACTION       Social History   Social History     Substance and Sexual Activity   Alcohol Use Yes    Comment: Social drinker     Social History     Substance and Sexual Activity   Drug Use No     Social History     Tobacco Use   Smoking Status Never   Smokeless Tobacco Never     Family History   Problem Relation Age of Onset   • Depression Mother    • Hypertension Mother    • Diabetes Mother    • Arthritis Mother    • Cancer Mother         breast   • Cancer Father         kidney   • Heart disease Father         CABGx3, CAD, pacemaker   • Bursitis Brother    • Diverticulosis Brother    • No Known Problems Daughter    • No Known Problems Son        Meds/Allergies       Current Outpatient Medications:   •  albuterol (Ventolin HFA) 90 mcg/act inhaler  •  montelukast (SINGULAIR) 10 mg tablet  •  Multiple Vitamins-Minerals (PRESERVISION AREDS 2+MULTI VIT PO)  •  olopatadine (PATANOL) 0.1 % ophthalmic solution  •  omeprazole (PriLOSEC) 20 mg delayed release capsule  •  psyllium (METAMUCIL) 0.52 g capsule    Allergies   Allergen Reactions   • Pollen Extract Other (See Comments)     Itchy burning eyes   • Cat Hair Extract      Animal dander-triggers asthma           Objective     Blood pressure 130/84, pulse 69, height 6' 2" (1.88 m), weight 129 kg (284 lb 3.2 oz). Body mass index is 36.49 kg/m². PHYSICAL EXAM:      General Appearance:   Alert, cooperative, no distress   HEENT:   Normocephalic, atraumatic, anicteric.     Neck:  Supple, symmetrical, trachea midline   Lungs:   Clear to auscultation bilaterally; no rales, rhonchi or wheezing; respirations unlabored    Heart[de-identified]   Regular rate and rhythm; no murmur, rub, or gallop.    Abdomen:   Soft, non-tender, non-distended; normal bowel sounds; no masses, no organomegaly    Genitalia:   Deferred    Rectal:   Deferred    Extremities:  No cyanosis, clubbing or edema    Pulses:  2+ and symmetric    Skin:  No jaundice, rashes, or lesions    Lymph nodes:  No palpable cervical lymphadenopathy        Lab Results:   No visits with results within 1 Day(s) from this visit.    Latest known visit with results is:   Office Visit on 06/16/2023   Component Date Value   • TSH W/RFX TO FREE T4 07/21/2023 4.20    • Total Cholesterol 07/21/2023 178    • HDL 07/21/2023 42    • Triglycerides 07/21/2023 83    • LDL Calculated 07/21/2023 118 (H)    • Chol HDLC Ratio 07/21/2023 4.2    • Non-HDL Cholesterol 07/21/2023 136 (H)    • Glucose, Random 07/21/2023 97    • BUN 07/21/2023 22    • Creatinine 07/21/2023 0.93    • eGFR 07/21/2023 98    • SL AMB BUN/CREATININE RA* 13/75/9277 NOT APPLICABLE    • Sodium 98/95/4821 138    • Potassium 07/21/2023 4.2    • Chloride 07/21/2023 103    • CO2 07/21/2023 28    • Calcium 07/21/2023 9.4    • Protein, Total 07/21/2023 6.9    • Albumin 07/21/2023 4.4    • Globulin 07/21/2023 2.5    • Albumin/Globulin Ratio 07/21/2023 1.8    • TOTAL BILIRUBIN 07/21/2023 0.6    • Alkaline Phosphatase 07/21/2023 67    • AST 07/21/2023 40 (H)    • ALT 07/21/2023 47 (H)    • White Blood Cell Count 07/21/2023 7.0    • Red Blood Cell Count 07/21/2023 5.37    • Hemoglobin 07/21/2023 16.0    • HCT 07/21/2023 46.6    • MCV 07/21/2023 86.8    • MCH 07/21/2023 29.8    • MCHC 07/21/2023 34.3    • RDW 07/21/2023 12.8    • Platelet Count 84/34/0428 206    • SL AMB MPV 07/21/2023 10.6    • Neutrophils (Absolute) 07/21/2023 4,200    • Lymphocytes (Absolute) 07/21/2023 1,911    • Monocytes (Absolute) 07/21/2023 539    • Eosinophils (Absolute) 07/21/2023 280    • Basophils ABS 07/21/2023 70    • Neutrophils 07/21/2023 60    • Lymphocytes 07/21/2023 27.3    • Monocytes 07/21/2023 7.7    • Eosinophils 07/21/2023 4.0    • Basophils PCT 07/21/2023 1.0    • Prostate Specific Antige* 07/21/2023 0.7    • PSA, Free 07/21/2023 0.3    • PSA, Free Pct 07/21/2023 43    • Hemoglobin A1C 07/21/2023 5.2    • Estimated Average Glucose 07/21/2023 103    • Estimated Average Glucos* 07/21/2023 5.7          Radiology Results:   No results found.

## 2023-08-16 NOTE — PATIENT INSTRUCTIONS
Scheduled date of EGD(as of today):10/17/23  Physician performing EGD:Alma  Location of EGD:[facility]  Instructions reviewed with patient by:Amee SHIPMAN  Clearances:  None

## 2023-08-21 ENCOUNTER — OFFICE VISIT (OUTPATIENT)
Dept: PHYSICAL THERAPY | Facility: CLINIC | Age: 53
End: 2023-08-21
Payer: COMMERCIAL

## 2023-08-21 DIAGNOSIS — M54.16 LUMBAR RADICULOPATHY: Primary | ICD-10-CM

## 2023-08-21 PROCEDURE — 97112 NEUROMUSCULAR REEDUCATION: CPT | Performed by: PHYSICAL THERAPIST

## 2023-08-21 NOTE — PROGRESS NOTES
Daily Note     Today's date: 2023  Patient name: Stacy Narvaez  : 1970  MRN: [id number]  Referring provider: Kirsten Chew*  Dx:   Encounter Diagnosis     ICD-10-CM    1. Lumbar radiculopathy  M54.16                      Subjective: Very stiff after lifting furniture  Objective: See treatment diary below      Assessment: Tolerated treatment well. Patient demonstrated fatigue post treatment and exhibited good technique with therapeutic exercises      Plan: Continue per plan of care.       Precautions: Medical History    Diagnosis Date Comment Source   Anal fissure 2004     Arthritis      Moe esophagus      GERD (gastroesophageal reflux disease)      Hiatal hernia      Infectious gastroenteritis 2012     Morbid obesity with BMI of 40.0-44.9, adult (HCC)      Osteoarthritis      Pilonidal cyst with abscess 2004 none since     PONV (postoperative nausea and vomiting)      Seasonal allergies      Slipped capital femoral epiphysis  left-surgical correction 1983    Snores      Wears glasses              Manuals                                                                 Neuro Re-Ed 7/7 7/14 7/18 7/24 8/1 8/4 8/15 8/21     Neurac supine pelvic lift 2x3 2x5 2x5  Stimula 2x5 stimula 30s hold 2x5  Stimula  40s 2x5 2x5 2x5     Neurac bridge 2x3 2x5 2x5  stimula 2x5 stim  30 s hold 2x5  Stim 30s 2x5 2x5 2x5     Neurac SDLY hip ADD/ABD 2x3  2x3 2x5  2x5 2x5  2x5 2x5 stim   7 sec hold 2x5  20s 2x5 2x5 2x5     Neurac plank  2x5           Neurac cerv retract supine  2x5  2x5 stim 10 sec hold 2x5 2x5 2x5 2x5     Neurac scap retract supine  2x5  2x5 stim 12 s hold 2x5 2x5 2x5 2x5     Neurac sh flex kneel     2x5 2x5 2x5 2x5     Ther Ex                                                                                                                     Ther Activity                                       Gait Training                                       Modalities

## 2023-08-28 ENCOUNTER — OFFICE VISIT (OUTPATIENT)
Dept: URGENT CARE | Facility: CLINIC | Age: 53
End: 2023-08-28
Payer: COMMERCIAL

## 2023-08-28 VITALS
RESPIRATION RATE: 14 BRPM | OXYGEN SATURATION: 96 % | BODY MASS INDEX: 35.69 KG/M2 | HEART RATE: 66 BPM | WEIGHT: 278 LBS | TEMPERATURE: 97.6 F

## 2023-08-28 DIAGNOSIS — L23.7 POISON IVY DERMATITIS: Primary | ICD-10-CM

## 2023-08-28 PROCEDURE — 99213 OFFICE O/P EST LOW 20 MIN: CPT | Performed by: PHYSICIAN ASSISTANT

## 2023-08-28 RX ORDER — PREDNISONE 10 MG/1
TABLET ORAL
Qty: 28 TABLET | Refills: 0 | Status: SHIPPED | OUTPATIENT
Start: 2023-08-28 | End: 2023-09-09

## 2023-08-28 RX ORDER — TRIAMCINOLONE ACETONIDE 1 MG/G
CREAM TOPICAL 2 TIMES DAILY
Qty: 30 G | Refills: 0 | Status: SHIPPED | OUTPATIENT
Start: 2023-08-28

## 2023-08-28 NOTE — PROGRESS NOTES
[facility]        NAME: Sandhya Becker is a 47 y.o. male  : 1970    MRN: [id number]  DATE: 2023  TIME: 4:27 PM    Assessment and Plan   Poison ivy dermatitis [L23.7]  1. Poison ivy dermatitis  predniSONE 10 mg tablet    triamcinolone (KENALOG) 0.1 % cream            Patient Instructions   Patient Instructions   I recommend we do the oral steroids for the next 12 days. Use the cream twice a day for 12 days. Follow up with PCP in 3-5 days. Proceed to  ER if symptoms worsen. Chief Complaint     Chief Complaint   Patient presents with   • Rash     Pt presents with rash outbreak that started one week ago; itchy, spreading on arms         History of Present Illness       The patient is a 50-year-old male presenting today for possible poison ivy x1 week. 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 Admits to itching and scratching the rash. Review of Systems   Review of Systems   Skin: Positive for rash. Current Medications       Current Outpatient Medications:   •  albuterol (Ventolin HFA) 90 mcg/act inhaler, Inhale 2 puffs every 6 (six) hours as needed for wheezing, Disp: 18 g, Rfl: 1  •  montelukast (SINGULAIR) 10 mg tablet, TAKE 1 TABLET DAILY AT BEDTIME, Disp: 90 tablet, Rfl: 3  •  Multiple Vitamins-Minerals (PRESERVISION AREDS 2+MULTI VIT PO), Take by mouth every morning Last dose 2020, Disp: , Rfl:   •  olopatadine (PATANOL) 0.1 % ophthalmic solution, Administer 1 drop to both eyes 2 (two) times a day, Disp: 5 mL, Rfl: 2  •  omeprazole (PriLOSEC) 20 mg delayed release capsule, TAKE 1 CAPSULE DAILY, Disp: 90 capsule, Rfl: 3  •  predniSONE 10 mg tablet, Take 4 tablets (40 mg total) by mouth daily for 4 days, THEN 2 tablets (20 mg total) daily for 4 days, THEN 1 tablet (10 mg total) daily for 4 days. , Disp: 28 tablet, Rfl: 0  •  psyllium (METAMUCIL) 0.52 g capsule, Take 0.52 g by mouth, Disp: , Rfl:   •  triamcinolone (KENALOG) 0.1 % cream, Apply topically 2 (two) times a day, Disp: 30 g, Rfl: 0    Current Allergies     Allergies as of 08/28/2023 - Reviewed 08/28/2023   Allergen Reaction Noted   • Pollen extract Other (See Comments) 07/23/2021   • Cat hair extract  09/11/2015            The following portions of the patient's history were reviewed and updated as appropriate: allergies, current medications, past family history, past medical history, past social history, past surgical history and problem list.     Past Medical History:   Diagnosis Date   • Anal fissure 01/16/2004   • Arthritis    • Moe esophagus    • GERD (gastroesophageal reflux disease)    • Hiatal hernia    • Infectious gastroenteritis 12/11/2012   • Morbid obesity with BMI of 40.0-44.9, adult ([address])    • Osteoarthritis    • Pilonidal cyst with abscess 01/16/2004    none since 2015   • PONV (postoperative nausea and vomiting)    • Seasonal allergies    • Slipped capital femoral epiphysis     left-surgical 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   • Snores    • Wears glasses        Past Surgical History:   Procedure Laterality Date   • EGD AND COLONOSCOPY  2020   • FRACTURE SURGERY      slipped epiphysis left femur 1893 pins   • KNEE ARTHROSCOPY Right 1997    meniscectomy   • PILONIDAL CYST / SINUS EXCISION     • SLIPPED CAPITAL FEMORAL EPIPHYSIS PINNING Left 06/1983   • TONSILLECTOMY     • VASECTOMY  2008   • WISDOM TOOTH EXTRACTION         Family History   Problem Relation Age of Onset   • Depression Mother    • Hypertension Mother    • Diabetes Mother    • Arthritis Mother    • Cancer Mother         breast   • Cancer Father         kidney   • Heart disease Father         CABGx3, CAD, pacemaker   • Bursitis Brother    • Diverticulosis Brother    • No Known Problems Daughter    • No Known Problems Son          Medications have been verified.         Objective   Pulse 66   Temp 97.6 °F (36.4 °C)   Resp 14   Wt 126 kg (278 lb)   SpO2 96%   BMI 35.69 kg/m²        Physical Exam     Physical Exam  Vitals and nursing note reviewed. Constitutional:       General: He is not in acute distress. Appearance: Normal appearance. He is normal weight. He is not ill-appearing, toxic-appearing or diaphoretic. Cardiovascular:      Rate and Rhythm: Normal rate. Pulmonary:      Effort: Pulmonary effort is normal.   Musculoskeletal:         General: Normal range of motion. Skin:     General: Skin is warm. Capillary Refill: Capillary refill takes less than 2 seconds. Findings: Erythema and rash present. Comments: Linear red papules and vesicles on the patient's upper extremity and lower extremity. Patient describes the same rash in his groin. Neurological:      Mental Status: He is alert.

## 2023-08-29 ENCOUNTER — APPOINTMENT (OUTPATIENT)
Dept: PHYSICAL THERAPY | Facility: CLINIC | Age: 53
End: 2023-08-29
Payer: COMMERCIAL

## 2023-09-07 ENCOUNTER — TELEPHONE (OUTPATIENT)
Dept: URGENT CARE | Facility: CLINIC | Age: 53
End: 2023-09-07

## 2023-09-07 DIAGNOSIS — L23.7 POISON IVY: Primary | ICD-10-CM

## 2023-09-07 NOTE — TELEPHONE ENCOUNTER
Patient is reporting that he ran out of the steroid cream due to diffuse surface area of the poison ivy. I will send in a refill. He reports that symptoms are improving.

## 2023-09-19 ENCOUNTER — OFFICE VISIT (OUTPATIENT)
Dept: FAMILY MEDICINE CLINIC | Facility: CLINIC | Age: 53
End: 2023-09-19
Payer: COMMERCIAL

## 2023-09-19 VITALS
TEMPERATURE: 97.9 F | DIASTOLIC BLOOD PRESSURE: 98 MMHG | OXYGEN SATURATION: 97 % | SYSTOLIC BLOOD PRESSURE: 140 MMHG | HEIGHT: 74 IN | HEART RATE: 84 BPM | RESPIRATION RATE: 18 BRPM | BODY MASS INDEX: 36.6 KG/M2 | WEIGHT: 285.2 LBS

## 2023-09-19 DIAGNOSIS — K22.70 BARRETT'S ESOPHAGUS WITHOUT DYSPLASIA: ICD-10-CM

## 2023-09-19 DIAGNOSIS — M54.41 CHRONIC RIGHT-SIDED LOW BACK PAIN WITH RIGHT-SIDED SCIATICA: ICD-10-CM

## 2023-09-19 DIAGNOSIS — L23.7 POISON IVY DERMATITIS: Primary | ICD-10-CM

## 2023-09-19 DIAGNOSIS — G89.29 CHRONIC RIGHT-SIDED LOW BACK PAIN WITH RIGHT-SIDED SCIATICA: ICD-10-CM

## 2023-09-19 PROCEDURE — 99214 OFFICE O/P EST MOD 30 MIN: CPT | Performed by: STUDENT IN AN ORGANIZED HEALTH CARE EDUCATION/TRAINING PROGRAM

## 2023-09-19 RX ORDER — TRIAMCINOLONE ACETONIDE 1 MG/G
CREAM TOPICAL 2 TIMES DAILY
Qty: 80 G | Refills: 0 | Status: SHIPPED | OUTPATIENT
Start: 2023-09-19

## 2023-09-19 RX ORDER — PREDNISONE 10 MG/1
TABLET ORAL
Qty: 14 TABLET | Refills: 0 | Status: SHIPPED | OUTPATIENT
Start: 2023-09-19 | End: 2023-10-01

## 2023-09-19 NOTE — PROGRESS NOTES
Clinic Visit Note  Laura Mcdonnell 46 y.o. male   MRN: [id number]    Assessment and Plan      Diagnoses and all orders for this visit:    Poison ivy dermatitis  Recommend continuing low-dose prednisone taper given multiple areas of dermatitis, Kenalog cream as needed up to twice daily, if symptoms worsen or not improving reevaluation recommended  -     triamcinolone (KENALOG) 0.1 % cream; Apply topically 2 (two) times a day  -     predniSONE 10 mg tablet; Take 2 tablets (20 mg total) by mouth daily for 4 days, THEN 1 tablet (10 mg total) daily for 4 days, THEN 0.5 tablets (5 mg total) daily for 4 days. Chronic right-sided low back pain with right-sided sciatica  Continue at home stretching/strengthening exercises    Moe's esophagus without dysplasia  Plan follow-up EGD    My impressions and treatment recommendations were discussed in detail with the patient who verbalized understanding and had no further questions. Discharge instructions were provided. Subjective     Chief Complaint: Follow-up visit    History of Present Illness:    Patient is a pleasant 57-year-old male coming in for poison ivy dermatitis follow-up, patient did well on prednisone taper but symptoms have returned. The following portions of the patient's history were reviewed and updated as appropriate: allergies, current medications, past family history, past medical history, past social history, past surgical history and problem list.    REVIEW OF SYSTEMS:  A complete 12-point review of systems is negative other than that noted in the HPI. Review of Systems   Constitutional: Negative for chills, fatigue and fever. HENT: Negative for congestion and sore throat. Eyes: Negative for pain and visual disturbance. Respiratory: Negative for shortness of breath and wheezing. Cardiovascular: Negative for chest pain and palpitations. Gastrointestinal: Negative for abdominal pain, constipation, diarrhea, nausea and vomiting. Genitourinary: Negative for dysuria and frequency. Musculoskeletal: Negative for back pain and neck pain. Skin: Positive for rash. Negative for color change and wound. Neurological: Negative for dizziness and headaches. Psychiatric/Behavioral: Negative for agitation and confusion. All other systems reviewed and are negative. Current Outpatient Medications:   •  albuterol (Ventolin HFA) 90 mcg/act inhaler, Inhale 2 puffs every 6 (six) hours as needed for wheezing, Disp: 18 g, Rfl: 1  •  montelukast (SINGULAIR) 10 mg tablet, TAKE 1 TABLET DAILY AT BEDTIME, Disp: 90 tablet, Rfl: 3  •  Multiple Vitamins-Minerals (PRESERVISION AREDS 2+MULTI VIT PO), Take by mouth every morning Last dose 6/14/2020, Disp: , Rfl:   •  olopatadine (PATANOL) 0.1 % ophthalmic solution, Administer 1 drop to both eyes 2 (two) times a day, Disp: 5 mL, Rfl: 2  •  omeprazole (PriLOSEC) 20 mg delayed release capsule, TAKE 1 CAPSULE DAILY, Disp: 90 capsule, Rfl: 3  •  predniSONE 10 mg tablet, Take 2 tablets (20 mg total) by mouth daily for 4 days, THEN 1 tablet (10 mg total) daily for 4 days, THEN 0.5 tablets (5 mg total) daily for 4 days. , Disp: 14 tablet, Rfl: 0  •  psyllium (METAMUCIL) 0.52 g capsule, Take 0.52 g by mouth, Disp: , Rfl:   •  triamcinolone (KENALOG) 0.1 % cream, Apply topically 2 (two) times a day, Disp: 80 g, Rfl: 0  Past Medical History:   Diagnosis Date   • Anal fissure 01/16/2004   • Arthritis    • Moe esophagus    • GERD (gastroesophageal reflux disease)    • Hiatal hernia    • Infectious gastroenteritis 12/11/2012   • Morbid obesity with BMI of 40.0-44.9, adult ([address] )    • Osteoarthritis    • Pilonidal cyst with abscess 01/16/2004    none since 2015   • PONV (postoperative nausea and vomiting)    • Seasonal allergies    • Slipped capital femoral epiphysis     left-surgical 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   • Snores    • Wears glasses      Past Surgical History:   Procedure Laterality Date   • EGD AND COLONOSCOPY  2020 • FRACTURE SURGERY      slipped epiphysis left femur 1893 pins   • KNEE ARTHROSCOPY Right 1997    meniscectomy   • PILONIDAL CYST / SINUS EXCISION     • SLIPPED CAPITAL FEMORAL EPIPHYSIS PINNING Left 06/1983   • TONSILLECTOMY     • VASECTOMY  2008   • WISDOM TOOTH EXTRACTION       Social History     Socioeconomic History   • Marital status: /Civil Union     Spouse name: Not on file   • Number of children: Not on file   • Years of education: Not on file   • Highest education level: Not on file   Occupational History   • Not on file   Tobacco Use   • Smoking status: Never     Passive exposure: Past   • Smokeless tobacco: Never   Vaping Use   • Vaping Use: Never used   Substance and Sexual Activity   • Alcohol use: Yes     Comment: Social drinker   • Drug use: No   • Sexual activity: Not on file   Other Topics Concern   • Not on file   Social History Narrative   • Not on file     Social Determinants of Health     Financial Resource Strain: Not on file   Food Insecurity: Not on file   Transportation Needs: Not on file   Physical Activity: Not on file   Stress: Not on file   Social Connections: Not on file   Intimate Partner Violence: Not on file   Housing Stability: Not on file     Family History   Problem Relation Age of Onset   • Depression Mother    • Hypertension Mother    • Diabetes Mother    • Arthritis Mother    • Cancer Mother         breast   • Cancer Father         kidney   • Heart disease Father         CABGx3, CAD, pacemaker   • Bursitis Brother    • Diverticulosis Brother    • No Known Problems Daughter    • No Known Problems Son      Allergies   Allergen Reactions   • Pollen Extract Other (See Comments)     Itchy burning eyes   • Cat Hair Extract      Animal dander-triggers asthma       Objective     Vitals:    09/19/23 1729   BP: 140/98   BP Location: Left arm   Patient Position: Sitting   Cuff Size: Large   Pulse: 84   Resp: 18   Temp: 97.9 °F (36.6 °C)   SpO2: 97%   Weight: 129 kg (285 lb 3.2 oz)   Height: 6' 2" (1.88 m)       Physical Exam:     GENERAL: NAD, pleasant   HEENT:  NC/AT, PERRL, EOMI, no scleral icterus  CARDIAC:  RRR, +S1/S2, no S3/S4 appreciated, no m/g/r  PULMONARY:  CTA B/L, no wheezing/rales/rhonci, non-labored breathing  ABDOMEN:  Soft, NT/ND, no rebound/guarding/rigidity  Extremities:. No edema, cyanosis, or clubbing  Musculoskeletal:  Full range of motion intact in all extremities   NEUROLOGIC: Grossly intact, no focal deficits  SKIN: Dermatitis rash appreciated  Psych: Normal affect, mood stable    ==  PLEASE NOTE:  This encounter was completed utilizing the [employer] Direct Speech Voice Recognition Software. Grammatical errors, random word insertions, pronoun errors and incomplete sentences are occasional consequences of the system due to software limitations, ambient noise and hardware issues. These may be missed by proof reading prior to affixing electronic signature. Any questions or concerns about the content, text or information contained within the body of this dictation should be directly addressed to the physician for clarification. Please do not hesitate to call me directly if you have any any questions or concerns.     Marquis Love DO  [facility] Internal Medicine   [facility]

## 2023-10-16 ENCOUNTER — TELEPHONE (OUTPATIENT)
Age: 53
End: 2023-10-16

## 2023-10-16 NOTE — TELEPHONE ENCOUNTER
Scheduled date of EGD(as of today): 11/14/23  Physician performing EGD: Alma  Location of EGD: [facility]

## 2023-10-25 ENCOUNTER — TELEPHONE (OUTPATIENT)
Dept: GASTROENTEROLOGY | Facility: CLINIC | Age: 53
End: 2023-10-25

## 2023-10-25 NOTE — TELEPHONE ENCOUNTER
Pt scheduled on 11/14/23 with Dr. Emanuel Ramos at [facility], however, he is no longer with the network. I called pt, however, voicemail box full and could not leave message. Moved pt to Dr. Juan Morris schedule, however, will call pt again to try  to notify.

## 2023-10-27 NOTE — TELEPHONE ENCOUNTER
Pt scheduled on 11/14/23 with Dr. Bonifacio Edward at [facility], however, he is no longer with the network. I called pt, lmom informing of this and that I would move to Dr. Ramos Aragon schedule the same date and location. I asked pt to please call me back to let me know if this is ok with him. Will call pt again in one week if do not hear back from him.

## 2023-10-30 ENCOUNTER — ANESTHESIA EVENT (OUTPATIENT)
Dept: ANESTHESIOLOGY | Facility: HOSPITAL | Age: 53
End: 2023-10-30

## 2023-10-30 ENCOUNTER — ANESTHESIA (OUTPATIENT)
Dept: ANESTHESIOLOGY | Facility: HOSPITAL | Age: 53
End: 2023-10-30

## 2023-11-13 ENCOUNTER — TELEPHONE (OUTPATIENT)
Dept: GASTROENTEROLOGY | Facility: AMBULARY SURGERY CENTER | Age: 53
End: 2023-11-13

## 2023-11-13 RX ORDER — SODIUM CHLORIDE, SODIUM LACTATE, POTASSIUM CHLORIDE, CALCIUM CHLORIDE 600; 310; 30; 20 MG/100ML; MG/100ML; MG/100ML; MG/100ML
75 INJECTION, SOLUTION INTRAVENOUS CONTINUOUS
Status: CANCELLED | OUTPATIENT
Start: 2023-11-13

## 2023-11-14 ENCOUNTER — HOSPITAL ENCOUNTER (OUTPATIENT)
Dept: GASTROENTEROLOGY | Facility: AMBULARY SURGERY CENTER | Age: 53
Setting detail: OUTPATIENT SURGERY
Discharge: HOME | End: 2023-11-14
Attending: INTERNAL MEDICINE | Admitting: INTERNAL MEDICINE
Payer: COMMERCIAL

## 2023-11-14 ENCOUNTER — TELEPHONE (OUTPATIENT)
Age: 53
End: 2023-11-14

## 2023-11-14 ENCOUNTER — ANESTHESIA (OUTPATIENT)
Dept: GASTROENTEROLOGY | Facility: AMBULARY SURGERY CENTER | Age: 53
End: 2023-11-14

## 2023-11-14 ENCOUNTER — ANESTHESIA EVENT (OUTPATIENT)
Dept: GASTROENTEROLOGY | Facility: AMBULARY SURGERY CENTER | Age: 53
End: 2023-11-14

## 2023-11-14 VITALS
BODY MASS INDEX: 36.57 KG/M2 | DIASTOLIC BLOOD PRESSURE: 81 MMHG | TEMPERATURE: 98.1 F | HEIGHT: 74 IN | RESPIRATION RATE: 18 BRPM | SYSTOLIC BLOOD PRESSURE: 122 MMHG | HEART RATE: 74 BPM | WEIGHT: 285 LBS | OXYGEN SATURATION: 99 %

## 2023-11-14 DIAGNOSIS — K22.70 BARRETT'S ESOPHAGUS WITHOUT DYSPLASIA: ICD-10-CM

## 2023-11-14 DIAGNOSIS — K21.9 GASTROESOPHAGEAL REFLUX DISEASE WITHOUT ESOPHAGITIS: ICD-10-CM

## 2023-11-14 PROBLEM — E66.9 OBESITY (BMI 30-39.9): Status: RESOLVED | Noted: 2023-11-14 | Resolved: 2023-11-14

## 2023-11-14 PROBLEM — E66.9 OBESITY (BMI 30-39.9): Status: ACTIVE | Noted: 2023-11-14

## 2023-11-14 PROCEDURE — 88342 IMHCHEM/IMCYTCHM 1ST ANTB: CPT | Performed by: STUDENT IN AN ORGANIZED HEALTH CARE EDUCATION/TRAINING PROGRAM

## 2023-11-14 PROCEDURE — 88360 TUMOR IMMUNOHISTOCHEM/MANUAL: CPT | Performed by: STUDENT IN AN ORGANIZED HEALTH CARE EDUCATION/TRAINING PROGRAM

## 2023-11-14 PROCEDURE — 88341 IMHCHEM/IMCYTCHM EA ADD ANTB: CPT | Performed by: STUDENT IN AN ORGANIZED HEALTH CARE EDUCATION/TRAINING PROGRAM

## 2023-11-14 PROCEDURE — 43239 EGD BIOPSY SINGLE/MULTIPLE: CPT | Performed by: INTERNAL MEDICINE

## 2023-11-14 PROCEDURE — 88305 TISSUE EXAM BY PATHOLOGIST: CPT | Performed by: STUDENT IN AN ORGANIZED HEALTH CARE EDUCATION/TRAINING PROGRAM

## 2023-11-14 RX ORDER — LIDOCAINE HYDROCHLORIDE 20 MG/ML
INJECTION, SOLUTION EPIDURAL; INFILTRATION; INTRACAUDAL; PERINEURAL AS NEEDED
Status: DISCONTINUED | OUTPATIENT
Start: 2023-11-14 | End: 2023-11-14

## 2023-11-14 RX ORDER — PROPOFOL 10 MG/ML
INJECTION, EMULSION INTRAVENOUS AS NEEDED
Status: DISCONTINUED | OUTPATIENT
Start: 2023-11-14 | End: 2023-11-14

## 2023-11-14 RX ORDER — SODIUM CHLORIDE, SODIUM LACTATE, POTASSIUM CHLORIDE, CALCIUM CHLORIDE 600; 310; 30; 20 MG/100ML; MG/100ML; MG/100ML; MG/100ML
75 INJECTION, SOLUTION INTRAVENOUS CONTINUOUS
Status: DISCONTINUED | OUTPATIENT
Start: 2023-11-14 | End: 2023-11-18 | Stop reason: HOSPADM

## 2023-11-14 RX ADMIN — LIDOCAINE HYDROCHLORIDE 100 MG: 20 INJECTION, SOLUTION EPIDURAL; INFILTRATION; INTRACAUDAL; PERINEURAL at 07:32

## 2023-11-14 RX ADMIN — PROPOFOL 100 MG: 10 INJECTION, EMULSION INTRAVENOUS at 07:35

## 2023-11-14 RX ADMIN — SODIUM CHLORIDE, SODIUM LACTATE, POTASSIUM CHLORIDE, AND CALCIUM CHLORIDE 75 ML/HR: .6; .31; .03; .02 INJECTION, SOLUTION INTRAVENOUS at 07:13

## 2023-11-14 RX ADMIN — PROPOFOL 100 MG: 10 INJECTION, EMULSION INTRAVENOUS at 07:37

## 2023-11-14 RX ADMIN — PROPOFOL 200 MG: 10 INJECTION, EMULSION INTRAVENOUS at 07:32

## 2023-11-14 NOTE — ANESTHESIA PREPROCEDURE EVALUATION
Procedure:  EGD    Relevant Problems   ANESTHESIA (within normal limits)   (-) History of anesthesia complications      CARDIO (within normal limits)      ENDO (within normal limits)      GI/HEPATIC   (+) Gastroesophageal reflux disease      /RENAL (within normal limits)      HEMATOLOGY (within normal limits)      MUSCULOSKELETAL   (+) Chronic right-sided low back pain with right-sided sciatica      NEURO/PSYCH   (+) Chronic right-sided low back pain with right-sided sciatica      PULMONARY (within normal limits)      Digestive   (+) Moe's esophagus without dysplasia      Other   (+) Dyslipidemia   (+) Elevated liver enzymes   (+) Morbid obesity (HCC)        Physical Exam    Airway    Mallampati score: II  TM Distance: >3 FB  Neck ROM: full     Dental   No notable dental hx     Cardiovascular  Rhythm: regular, Rate: normal, Cardiovascular exam normal    Pulmonary  Pulmonary exam normal Breath sounds clear to auscultation    Other Findings        Anesthesia Plan  ASA Score- 2     Anesthesia Type- IV sedation with anesthesia with ASA Monitors. Additional Monitors:     Airway Plan:            Plan Factors-Exercise tolerance (METS): >4 METS. Chart reviewed. EKG reviewed. Imaging results reviewed. Existing labs reviewed. Patient summary reviewed. Patient is not a current smoker. Patient did not smoke on day of surgery. Induction- intravenous. Postoperative Plan-     Informed Consent- Anesthetic plan and risks discussed with patient. I personally reviewed this patient with the CRNA. Discussed and agreed on the Anesthesia Plan with the CRNA. Romain Gaffney Normal stress test (2021)        NPO verified. NKDA. Plan:  IV sedation/MAC, GA as backup    Benefits and risks of sedation were discussed with the patient including possibility of recall under sedation and the potential for conversion to general anesthesia if necessary. All questions were answered.   Anesthesia consent was obtained from the patient.

## 2023-11-14 NOTE — TELEPHONE ENCOUNTER
The patient call to request a Insurance referral for Podiatry     NPI : [id number]  Appointment : 11/17/2023

## 2023-11-14 NOTE — ANESTHESIA POSTPROCEDURE EVALUATION
Post-Op Assessment Note    CV Status:  Stable  Pain Score: 0    Pain management: adequate     Mental Status:  Sleepy and arousable   Hydration Status:  Stable   PONV Controlled:  None   Airway Patency:  Patent and adequate      Post Op Vitals Reviewed: Yes      Staff: CRNA         No notable events documented.     BP      Temp      Pulse     Resp      SpO2

## 2023-11-14 NOTE — H&P
History and Physical -  [facility]  Tonny Beaver 46 y.o. male MRN: [id number]        HPI: 50-year-old male with history of Moe's esophagus. Regular bowel movements.     Historical Information   Past Medical History:   Diagnosis Date    Anal fissure 01/16/2004    Arthritis     Moe esophagus     GERD (gastroesophageal reflux disease)     Hiatal hernia     Infectious gastroenteritis 12/11/2012    Morbid obesity with BMI of 40.0-44.9, adult ([address] [facility])     Osteoarthritis     Pilonidal cyst with abscess 01/16/2004    none since 2015    PONV (postoperative nausea and vomiting)     Seasonal allergies     Slipped capital femoral epiphysis     left-surgical 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    Snores     Wears glasses      Past Surgical History:   Procedure Laterality Date    EGD AND COLONOSCOPY  2020    FRACTURE SURGERY      slipped epiphysis left femur 1893 pins    KNEE ARTHROSCOPY Right 1997    meniscectomy    PILONIDAL CYST / SINUS EXCISION      SLIPPED CAPITAL FEMORAL EPIPHYSIS PINNING Left 06/1983    TONSILLECTOMY      VASECTOMY  2008    WISDOM TOOTH EXTRACTION       Social History   Social History     Substance and Sexual Activity   Alcohol Use Yes    Comment: Social drinker     Social History     Substance and Sexual Activity   Drug Use No     Social History     Tobacco Use   Smoking Status Never    Passive exposure: Past   Smokeless Tobacco Never     Family History   Problem Relation Age of Onset    Depression Mother     Hypertension Mother     Diabetes Mother     Arthritis Mother     Cancer Mother         breast    Cancer Father         kidney    Heart disease Father         CABGx3, CAD, pacemaker    Bursitis Brother     Diverticulosis Brother     No Known Problems Daughter     No Known Problems Son        Meds/Allergies     (Not in a hospital admission)      Allergies   Allergen Reactions    Pollen Extract Other (See Comments)     Itchy burning eyes    Cat Hair Extract      Animal dander-triggers asthma Objective     Blood pressure 118/79, pulse 77, temperature 98.1 °F (36.7 °C), temperature source Temporal, resp. rate 18, height 6' 2" (1.88 m), weight 129 kg (285 lb), SpO2 97 %.     PHYSICAL EXAM:    Gen: NAD  CV: S1 & S2 normal, RRR  CHEST: Clear to auscultate  ABD: soft, NT/ND, good bowel sounds  EXT: no edema    ASSESSMENT:     History of Moe's    PLAN:    EGD

## 2023-11-17 DIAGNOSIS — C15.5 MALIGNANT NEOPLASM OF LOWER THIRD OF ESOPHAGUS (HCC): Primary | ICD-10-CM

## 2023-11-17 PROCEDURE — 88342 IMHCHEM/IMCYTCHM 1ST ANTB: CPT | Performed by: STUDENT IN AN ORGANIZED HEALTH CARE EDUCATION/TRAINING PROGRAM

## 2023-11-17 PROCEDURE — 88341 IMHCHEM/IMCYTCHM EA ADD ANTB: CPT | Performed by: STUDENT IN AN ORGANIZED HEALTH CARE EDUCATION/TRAINING PROGRAM

## 2023-11-17 PROCEDURE — 88360 TUMOR IMMUNOHISTOCHEM/MANUAL: CPT | Performed by: STUDENT IN AN ORGANIZED HEALTH CARE EDUCATION/TRAINING PROGRAM

## 2023-11-17 PROCEDURE — 88305 TISSUE EXAM BY PATHOLOGIST: CPT | Performed by: STUDENT IN AN ORGANIZED HEALTH CARE EDUCATION/TRAINING PROGRAM

## 2023-11-20 ENCOUNTER — TELEPHONE (OUTPATIENT)
Age: 53
End: 2023-11-20

## 2023-11-20 ENCOUNTER — TELEPHONE (OUTPATIENT)
Dept: GASTROENTEROLOGY | Facility: CLINIC | Age: 53
End: 2023-11-20

## 2023-11-20 NOTE — TELEPHONE ENCOUNTER
Patients GI provider:  Dr. Camron Cee    Reason for call: Pt calling in with questions regarding fasting for blood work, patient's questions answered. Patient also called in to schedule ct scan. Patient was transferred over to central scheduling.     Scheduled procedure/appointment date if applicable: Apt/procedure n/a

## 2023-11-20 NOTE — TELEPHONE ENCOUNTER
Scheduled date of EUS(as of today):12/21/2023  Physician performing EUS: [facility]  Location of EUS:[place]  Instructions reviewed with patient by: Jacquie Bruner through mail and thru [facility]  Clearances:  NONE    DR. Brian Davies requested procedure ASAP after patient has had CT

## 2023-11-21 ENCOUNTER — APPOINTMENT (OUTPATIENT)
Dept: LAB | Facility: CLINIC | Age: 53
End: 2023-11-21
Payer: COMMERCIAL

## 2023-11-21 DIAGNOSIS — C15.5 MALIGNANT NEOPLASM OF LOWER THIRD OF ESOPHAGUS (HCC): Primary | ICD-10-CM

## 2023-11-21 LAB
ALBUMIN SERPL BCP-MCNC: 4.2 G/DL (ref 3.5–5)
ALP SERPL-CCNC: 55 U/L (ref 34–104)
ALT SERPL W P-5'-P-CCNC: 67 U/L (ref 7–52)
ANION GAP SERPL CALCULATED.3IONS-SCNC: 7 MMOL/L
AST SERPL W P-5'-P-CCNC: 56 U/L (ref 13–39)
BASOPHILS # BLD AUTO: 0.07 THOUSANDS/ÂΜL (ref 0–0.1)
BASOPHILS NFR BLD AUTO: 1 % (ref 0–1)
BILIRUB SERPL-MCNC: 0.66 MG/DL (ref 0.2–1)
BUN SERPL-MCNC: 17 MG/DL (ref 5–25)
CALCIUM SERPL-MCNC: 9.9 MG/DL (ref 8.4–10.2)
CHLORIDE SERPL-SCNC: 104 MMOL/L (ref 96–108)
CO2 SERPL-SCNC: 29 MMOL/L (ref 21–32)
CREAT SERPL-MCNC: 0.89 MG/DL (ref 0.6–1.3)
EOSINOPHIL # BLD AUTO: 0.28 THOUSAND/ÂΜL (ref 0–0.61)
EOSINOPHIL NFR BLD AUTO: 5 % (ref 0–6)
ERYTHROCYTE [DISTWIDTH] IN BLOOD BY AUTOMATED COUNT: 12.8 % (ref 11.6–15.1)
GFR SERPL CREATININE-BSD FRML MDRD: 96 ML/MIN/1.73SQ M
GLUCOSE P FAST SERPL-MCNC: 101 MG/DL (ref 65–99)
HCT VFR BLD AUTO: 46.6 % (ref 36.5–49.3)
HGB BLD-MCNC: 16.2 G/DL (ref 12–17)
IMM GRANULOCYTES # BLD AUTO: 0.03 THOUSAND/UL (ref 0–0.2)
IMM GRANULOCYTES NFR BLD AUTO: 1 % (ref 0–2)
INR PPP: 1.11 (ref 0.84–1.19)
LYMPHOCYTES # BLD AUTO: 1.52 THOUSANDS/ÂΜL (ref 0.6–4.47)
LYMPHOCYTES NFR BLD AUTO: 26 % (ref 14–44)
MCH RBC QN AUTO: 30.8 PG (ref 26.8–34.3)
MCHC RBC AUTO-ENTMCNC: 34.8 G/DL (ref 31.4–37.4)
MCV RBC AUTO: 89 FL (ref 82–98)
MONOCYTES # BLD AUTO: 0.53 THOUSAND/ÂΜL (ref 0.17–1.22)
MONOCYTES NFR BLD AUTO: 9 % (ref 4–12)
NEUTROPHILS # BLD AUTO: 3.39 THOUSANDS/ÂΜL (ref 1.85–7.62)
NEUTS SEG NFR BLD AUTO: 58 % (ref 43–75)
NRBC BLD AUTO-RTO: 0 /100 WBCS
PLATELET # BLD AUTO: 205 THOUSANDS/UL (ref 149–390)
PMV BLD AUTO: 10.6 FL (ref 8.9–12.7)
POTASSIUM SERPL-SCNC: 4.4 MMOL/L (ref 3.5–5.3)
PROT SERPL-MCNC: 6.7 G/DL (ref 6.4–8.4)
PROTHROMBIN TIME: 14.2 SECONDS (ref 11.6–14.5)
RBC # BLD AUTO: 5.26 MILLION/UL (ref 3.88–5.62)
SODIUM SERPL-SCNC: 140 MMOL/L (ref 135–147)
WBC # BLD AUTO: 5.82 THOUSAND/UL (ref 4.31–10.16)

## 2023-11-21 PROCEDURE — 85025 COMPLETE CBC W/AUTO DIFF WBC: CPT

## 2023-11-21 PROCEDURE — 36415 COLL VENOUS BLD VENIPUNCTURE: CPT

## 2023-11-21 PROCEDURE — 85610 PROTHROMBIN TIME: CPT

## 2023-11-21 PROCEDURE — 80053 COMPREHEN METABOLIC PANEL: CPT

## 2023-11-22 ENCOUNTER — APPOINTMENT (OUTPATIENT)
Dept: RADIOLOGY | Facility: CLINIC | Age: 53
End: 2023-11-22
Payer: COMMERCIAL

## 2023-11-22 ENCOUNTER — OFFICE VISIT (OUTPATIENT)
Age: 53
End: 2023-11-22
Payer: COMMERCIAL

## 2023-11-22 VITALS
DIASTOLIC BLOOD PRESSURE: 92 MMHG | HEIGHT: 74 IN | BODY MASS INDEX: 36.57 KG/M2 | WEIGHT: 285 LBS | SYSTOLIC BLOOD PRESSURE: 126 MMHG | HEART RATE: 71 BPM

## 2023-11-22 DIAGNOSIS — Q66.222 METATARSUS ADDUCTUS OF BOTH FEET: ICD-10-CM

## 2023-11-22 DIAGNOSIS — M79.672 BILATERAL FOOT PAIN: ICD-10-CM

## 2023-11-22 DIAGNOSIS — L84 CALLUS OF FOOT: ICD-10-CM

## 2023-11-22 DIAGNOSIS — M79.671 BILATERAL FOOT PAIN: ICD-10-CM

## 2023-11-22 DIAGNOSIS — Q66.221 METATARSUS ADDUCTUS OF BOTH FEET: ICD-10-CM

## 2023-11-22 DIAGNOSIS — M19.079 INFLAMMATION OF METATARSOPHALANGEAL JOINT: ICD-10-CM

## 2023-11-22 DIAGNOSIS — M79.672 BILATERAL FOOT PAIN: Primary | ICD-10-CM

## 2023-11-22 DIAGNOSIS — M79.671 BILATERAL FOOT PAIN: Primary | ICD-10-CM

## 2023-11-22 PROCEDURE — 11056 PARNG/CUTG B9 HYPRKR LES 2-4: CPT | Performed by: STUDENT IN AN ORGANIZED HEALTH CARE EDUCATION/TRAINING PROGRAM

## 2023-11-22 PROCEDURE — 73630 X-RAY EXAM OF FOOT: CPT

## 2023-11-22 PROCEDURE — 99203 OFFICE O/P NEW LOW 30 MIN: CPT | Performed by: STUDENT IN AN ORGANIZED HEALTH CARE EDUCATION/TRAINING PROGRAM

## 2023-11-22 RX ORDER — DEXAMETHASONE SODIUM PHOSPHATE 10 MG/ML
4 INJECTION, SOLUTION INTRAMUSCULAR; INTRAVENOUS
Status: SHIPPED | OUTPATIENT
Start: 2023-11-22

## 2023-11-22 RX ORDER — LIDOCAINE HYDROCHLORIDE 10 MG/ML
1 INJECTION, SOLUTION EPIDURAL; INFILTRATION; INTRACAUDAL; PERINEURAL ONCE
Status: COMPLETED | OUTPATIENT
Start: 2023-11-22 | End: 2023-11-22

## 2023-11-22 RX ORDER — DEXAMETHASONE SODIUM PHOSPHATE 4 MG/ML
4 INJECTION, SOLUTION INTRA-ARTICULAR; INTRALESIONAL; INTRAMUSCULAR; INTRAVENOUS; SOFT TISSUE ONCE
Status: COMPLETED | OUTPATIENT
Start: 2023-11-22 | End: 2023-11-22

## 2023-11-22 RX ADMIN — LIDOCAINE HYDROCHLORIDE 1 ML: 10 INJECTION, SOLUTION EPIDURAL; INFILTRATION; INTRACAUDAL; PERINEURAL at 08:45

## 2023-11-22 RX ADMIN — DEXAMETHASONE SODIUM PHOSPHATE 4 MG: 10 INJECTION, SOLUTION INTRAMUSCULAR; INTRAVENOUS at 08:45

## 2023-11-22 RX ADMIN — DEXAMETHASONE SODIUM PHOSPHATE 4 MG: 4 INJECTION, SOLUTION INTRA-ARTICULAR; INTRALESIONAL; INTRAMUSCULAR; INTRAVENOUS; SOFT TISSUE at 15:34

## 2023-11-22 RX ADMIN — LIDOCAINE HYDROCHLORIDE 1 ML: 10 INJECTION, SOLUTION EPIDURAL; INFILTRATION; INTRACAUDAL; PERINEURAL at 15:34

## 2023-11-22 NOTE — PROGRESS NOTES
This patient was seen on 11/22/2023. My role is Foot , Ankle, and Wound Specialist    ASSESSMENT     Diagnoses and all orders for this visit:    Bilateral foot pain  -     XR foot 3+ vw left; Future  -     XR foot 3+ vw right; Future  -     lidocaine (PF) (XYLOCAINE-MPF) 1 % injection 1 mL  -     dexamethasone (DECADRON) injection 4 mg    Metatarsus adductus of both feet    Callus of foot    Inflammation of metatarsophalangeal joint  -     lidocaine (PF) (XYLOCAINE-MPF) 1 % injection 1 mL  -     dexamethasone (DECADRON) injection 4 mg         Problem List Items Addressed This Visit    None  Visit Diagnoses       Bilateral foot pain    -  Primary    Relevant Medications    lidocaine (PF) (XYLOCAINE-MPF) 1 % injection 1 mL (Start on 11/22/2023  1:00 PM)    dexamethasone (DECADRON) injection 4 mg (Start on 11/22/2023  1:00 PM)    Other Relevant Orders    XR foot 3+ vw left    XR foot 3+ vw right    Metatarsus adductus of both feet        Callus of foot        Inflammation of metatarsophalangeal joint        Relevant Medications    lidocaine (PF) (XYLOCAINE-MPF) 1 % injection 1 mL (Start on 11/22/2023  1:00 PM)    dexamethasone (DECADRON) injection 4 mg (Start on 11/22/2023  1:00 PM)            PLAN  -Patient was educated regarding their condition.  -We discussed changes in shoe wear including finding shoes with a wider toe box  -a small adhesive pad was provided to the patient for padding to the lateral aspect of the 5th metatarsal head  -Bursal injection performed as below:   -Hyperkeratotic tissue x 2 pared to the level of healthy dermis utilizing a #10 blade without incident  -RTC 3-months    Foot/lower extremity injection    Performed by: Sylvia Panchal DPM  Authorized by: Sylvia Panchal DPM    Procedure:      Other Assisting Provider: No      Verbal consent obtained?: Yes      Risks and benefits: Risks, benefits and alternatives were discussed      Consent given by:  Patient    Time out: Immediately prior to the procedure a time out was called      Patient states understanding of procedure being performed: Yes      Required items: Required blood products, implants, devices and special equipment available      Patient identity confirmed:  Verbally with patient    Supporting Documentation:     Indications:  Pain    Procedure Details:    Prep: patient was prepped and draped in usual sterile fashion                Ethyl Chloride was applied      Needle size: 25 G G    Ultrasound Guidance: no      Approach:  Lateral    Laterality:  Left    Cyst Aspiration/Injection: No      Location comment:  Left submetatarsal 5 bursa    Injection Information:       Medications:  4 mg dexamethasone 100 mg/10 mL; 1 mL lidocaine (PF) (XYLOCAINE-MPF) injection 1 %    Patient tolerance:  Patient tolerated the procedure well with no immediate complications  Lesion Destruction    Date/Time: 11/22/2023 8:45 AM    Performed by: Barbara Ortez DPM  Authorized by: Barbara Ortez DPM  Universal Protocol:  Consent: Verbal consent obtained. Risks and benefits: risks, benefits and alternatives were discussed  Consent given by: patient  Time out: Immediately prior to procedure a "time out" was called to verify the correct patient, procedure, equipment, support staff and site/side marked as required.   Patient understanding: patient states understanding of the procedure being performed  Patient identity confirmed: verbally with patient    Procedure Details - Lesion Destruction:     Number of Lesions:  2  Lesion 1:     Body area:  Lower extremity    Lower extremity location:  L foot    Malignancy: benign hyperkeratotic lesion      Destruction method: scissors used for extraction    Lesion 2:     Body area:  Lower extremity    Lower extremity location:  R foot    Malignancy: benign hyperkeratotic lesion      Destruction method: scissors used for extraction      SUBJECTIVE    Chief Complaint:  Bilateral foot pain     Patient ID: Florentin Retana     11/22/2023: Cristhian Johnson is a pleasant 25-year-old male who presents today with bilateral foot pain. He states that he originally had pain approximately 3 years ago was given orthotics and an injection at this time which really helped. He states that the pain is mostly when he is walking on it and that he is very active and walks approximately 4 to 6 miles per day with his dog. He states that he has not attempted any Tylenol or Motrin. The following portions of the patient's history were reviewed and updated as appropriate: allergies, current medications, past family history, past medical history, past social history, past surgical history and problem list.    Review of Systems   Constitutional: Negative. Respiratory: Negative. Cardiovascular: Negative. Gastrointestinal: Negative. Genitourinary: Negative. Musculoskeletal:  Positive for arthralgias and myalgias. Skin:         Callus x 2         OBJECTIVE      /92   Pulse 71   Ht 6' 2" (1.88 m)   Wt 129 kg (285 lb)   BMI 36.59 kg/m²        Physical Exam  Constitutional:       Appearance: Normal appearance. HENT:      Head: Normocephalic and atraumatic. Eyes:      General:         Right eye: No discharge. Left eye: No discharge. Cardiovascular:      Rate and Rhythm: Normal rate and regular rhythm. Pulses:           Dorsalis pedis pulses are 2+ on the right side and 2+ on the left side. Posterior tibial pulses are 2+ on the right side and 2+ on the left side. Pulmonary:      Effort: Pulmonary effort is normal.      Breath sounds: Normal breath sounds. Skin:     General: Skin is warm. Capillary Refill: Capillary refill takes less than 2 seconds. Neurological:      Mental Status: He is alert and oriented to person, place, and time. Sensory: Sensation is intact. No sensory deficit. Psychiatric:         Mood and Affect: Mood normal.         MSK:  -Pain on palpation of left 5th  metatarsal head laterally.  I also note an underlying palpable exostosis at this location  -Pain on palpation to the bilateral fifth metatarsal head plantarly at the location of hyperkeratotic tissue.  -left 5th digit position is in an adductovarus position  -Active range of motion lesser digits intact  -MMT 5/5 to all muscle compartments of the lower extremity  -Ankle dorsiflexion >10 degrees with knee extended, and knee flexed    Derm:  -No lesions, abrasions, or open wounds noted  -Hyperkeratotic tissue noted to the bilateral fifth metatarsal head plantarly.     Vascular:  -DP and PT pulses intact b/l  -Capillary refill time <2 sec b/l  -Digital hair growth: Present  -Skin temp: WNL    Neuro:  -Light sensation intact bilaterally  -Protective sensation intact bilaterally

## 2023-11-29 ENCOUNTER — HOSPITAL ENCOUNTER (OUTPATIENT)
Dept: RADIOLOGY | Facility: HOSPITAL | Age: 53
Discharge: HOME | End: 2023-11-29
Attending: INTERNAL MEDICINE
Payer: COMMERCIAL

## 2023-11-29 DIAGNOSIS — C15.5 MALIGNANT NEOPLASM OF LOWER THIRD OF ESOPHAGUS (HCC): ICD-10-CM

## 2023-11-29 PROCEDURE — G1004 CDSM NDSC: HCPCS

## 2023-11-29 PROCEDURE — 71260 CT THORAX DX C+: CPT

## 2023-11-29 PROCEDURE — 74177 CT ABD & PELVIS W/CONTRAST: CPT

## 2023-11-29 RX ADMIN — IOHEXOL 100 ML: 350 INJECTION, SOLUTION INTRAVENOUS at 08:35

## 2023-12-04 ENCOUNTER — DOCUMENTATION (OUTPATIENT)
Dept: HEMATOLOGY ONCOLOGY | Facility: CLINIC | Age: 53
End: 2023-12-04

## 2023-12-04 ENCOUNTER — TELEPHONE (OUTPATIENT)
Age: 53
End: 2023-12-04

## 2023-12-04 ENCOUNTER — PATIENT OUTREACH (OUTPATIENT)
Dept: HEMATOLOGY ONCOLOGY | Facility: CLINIC | Age: 53
End: 2023-12-04

## 2023-12-04 DIAGNOSIS — C15.9 ADENOCARCINOMA OF ESOPHAGUS (HCC): Primary | ICD-10-CM

## 2023-12-04 DIAGNOSIS — C15.9 ESOPHAGEAL ADENOCARCINOMA (HCC): Primary | ICD-10-CM

## 2023-12-04 DIAGNOSIS — N28.1 RENAL CYST: Primary | ICD-10-CM

## 2023-12-04 NOTE — TELEPHONE ENCOUNTER
Called and spoke with patient. Advised results still in process and will call once complete. Patient verbalized understanding.

## 2023-12-04 NOTE — PROGRESS NOTES
Intake reviewed   12/4/2023  Diagnosis   Esophageal adenocarcinoma    Referrals placed   Med Onc, Surg Onc, Rad Onc  Labs ordered   none  Scan(s) ordered and scheduled   [email or web address]  Consults scheduled   Dr. Elzbieta Lopez 12/12/2023  Dr. Hernandez Amin 1/9/2023    Appropriate for NN initial outreach/Gen Assess   yes

## 2023-12-04 NOTE — PROGRESS NOTES
Phone outreach to the patient, I introduced myself and explained my service to him. I went over his upcoming appointments and the patient is aware of them. The patient reports understanding his diagnosis and was "calm" when speaking with him. Pt reports 4/10 intermittent pain at the area of his xiphoid process with movement onset about one month ago after his EGD. He denies  N/V or diarrhea or any weight loss at present. He reports having a reduced appetite and feels full sooner than usual but he reports is eating fairly normal amount and knows he needs to finish meals. he denies any difficulty swallowing anything. He refused nutrition suggestion and reports will contact me if if his meal portions get smaller. Pt reports living at home with his wife and his family are a good support for him. He denies any barriers getting to or from any of his appointments and states he will drive himself or his wife can drive him if needed. He denies any physical barriers at present. I placed referrals for Palliative Care and Social Work. I explained what each service can offer and the patient agreed. At this point he declined any other resource options. We completed a General Assessment together, I provided my contact information and Rubia Chen and instructed him to please call if he has any questions or concerns. I thanked him for his time.

## 2023-12-04 NOTE — PROGRESS NOTES
Referring provider-  Dr. Verenice Villarreal      EGD date-  11/14/23      Impression-  3 cm hiatal hernia - GE junction 38 cm from the incisors, diaphragmatic impression 41 cm from the incisors  Distal esophagus-irregular columnar mucosal extensions extending for about 3 cm from the GE junction were biopsied for Moe's  The stomach and duodenum appeared normal.         Pathology-    Final Diagnosis   A. 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 Immunohistochemistry performed at [facility] demonstrates the following staining profile in the lesional cells:               Positive: AE1/AE3, CDX2     Note: Ancillary testing for HER2 immunohistochemistry and mismatch repair (MMR) protein deficiency by immunohistochemical panel (MLH1, PMS2, MSH2, MSH6) is undertaken; the results will be issued in a supplemental report, to follow shortly. Electronically signed by Yusra Tirado DO on 11/17/2023 at 10:08 AM         Labs-  11/21/23 CBC and CMP completed      Imaging-  CT chest abdomen pelvis w contrast     Study complete, not yet resulted.      Scheduled appointments-    12/12 - Dr. Carline Zaman

## 2023-12-06 ENCOUNTER — PATIENT OUTREACH (OUTPATIENT)
Dept: CASE MANAGEMENT | Facility: OTHER | Age: 53
End: 2023-12-06

## 2023-12-06 NOTE — PROGRESS NOTES
[facility] received SW referral. Pt has his consult with Dr. Jesica Tipton on 12/12. OSW will place outreach TC to complete the distress thermometer and psychosocial assessment after his consult.

## 2023-12-08 PROBLEM — C15.9 ADENOCARCINOMA OF ESOPHAGUS (HCC): Status: ACTIVE | Noted: 2023-12-08

## 2023-12-12 ENCOUNTER — CONSULT (OUTPATIENT)
Dept: SURGICAL ONCOLOGY | Facility: CLINIC | Age: 53
End: 2023-12-12
Payer: COMMERCIAL

## 2023-12-12 ENCOUNTER — DOCUMENTATION (OUTPATIENT)
Dept: OTHER | Facility: HOSPITAL | Age: 53
End: 2023-12-12

## 2023-12-12 VITALS
DIASTOLIC BLOOD PRESSURE: 82 MMHG | WEIGHT: 290 LBS | HEART RATE: 83 BPM | BODY MASS INDEX: 37.22 KG/M2 | OXYGEN SATURATION: 99 % | RESPIRATION RATE: 16 BRPM | SYSTOLIC BLOOD PRESSURE: 130 MMHG | TEMPERATURE: 97.9 F | HEIGHT: 74 IN

## 2023-12-12 DIAGNOSIS — C15.9 ADENOCARCINOMA OF ESOPHAGUS (HCC): Primary | ICD-10-CM

## 2023-12-12 PROCEDURE — 99205 OFFICE O/P NEW HI 60 MIN: CPT | Performed by: SURGERY

## 2023-12-12 RX ORDER — DIPHENOXYLATE HYDROCHLORIDE AND ATROPINE SULFATE 2.5; .025 MG/1; MG/1
1 TABLET ORAL DAILY
COMMUNITY

## 2023-12-12 NOTE — PROGRESS NOTES
Surgical Oncology Consult       [facility]  [facility]  [facility] [place] [address]    Narcisa Bond  1970  [id number]  [address] [facility]  [facility]  [address] [place] [address]    Diagnoses and all orders for this visit:    Adenocarcinoma of esophagus ([address])  -     Ambulatory referral to Surgical Oncology    Other orders  -     multivitamin (THERAGRAN) TABS; Take 1 tablet by mouth daily [address] LU[address] boykin multivitamin        Chief Complaint   Patient presents with    Consult       Return in about 4 weeks (around 1/9/2024) for Office Visit. Oncology History   Adenocarcinoma of esophagus ([address])   11/14/2023 Biopsy    EGD:   Esophagus, Distal Esophagus, Biopsy:  - Adenocarcinoma arising in squamocolumnar junctional mucosa with Moe's esophagus and high grade dysplasia. 12/12/2023 -  Cancer Staged    Staging form: Esophagus - Adenocarcinoma, AJCC 8th Edition  - Clinical: Stage I (cT1, cN0, cM0) - Signed by Steven Madrigal MD on 12/12/2023  Stage prefix: Initial diagnosis  Total positive nodes: 0           History of Present Illness: 71-year-old male with a history of reflux. He had an EGD and 2020 and was told to follow-up in 3 years. EGD from November 14 revealed irregular mucosa extending 3 cm from the GE junction. This was clinical Moe's disease. The stomach appeared normal.  Pathology revealed adenocarcinoma with Moe's esophagus and high-grade dysplasia. Mismatch repair proteins were expressed. CT from November 29, 2023 reveals no evidence of metastatic disease. There is a 1.1 cm left renal cyst.  I personally reviewed the films. He comes in now to discuss further therapy. He tells me his gastroenterologist was somewhat surprised that adenocarcinoma was seen as there was no evidence of nodularity on the EGD. He is now scheduled for EUS as well as a PET/CT.   He has no dysphagia. No weight loss. Paternal grandfather with esophagus cancer in his 76s. Review of Systems  Complete ROS Surg Onc:   Constitutional: The patient denies new or recent history of general fatigue, no recent weight loss, no change in appetite. Eyes: No complaints of visual problems, no scleral icterus. ENT: no complaints of ear pain, no hoarseness, no difficulty swallowing,  no tinnitus and no new masses in head, oral cavity, or neck. Cardiovascular: No complaints of chest pain, no palpitations, no ankle edema. Respiratory: No complaints of shortness of breath, no cough. Gastrointestinal: No complaints of jaundice, no bloody stools, no pale stools. Genitourinary: No complaints of dysuria, no hematuria, no nocturia, no frequent urination, no urethral discharge. Musculoskeletal: No complaints of weakness, paralysis, joint stiffness or arthralgias. Integumentary: No complaints of rash, no new lesions. Neurological: No complaints of convulsions, no seizures, no dizziness. Hematologic/Lymphatic: No complaints of easy bruising. Endocrine:  No hot or cold intolerance. No polydipsia, polyphagia, or polyuria. Allergy/immunology:  No environmental allergies. No food allergies. Not immunocompromised. Skin:  No pallor or rash. No wound.           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    Chronic right-sided low back pain with right-sided sciatica    Morbid obesity ([facility])    Dyslipidemia    Gastroesophageal reflux disease    Elevated liver enzymes    Moe's esophagus without dysplasia    Adenocarcinoma of esophagus (HCC)     Past Medical History:   Diagnosis Date    Anal fissure 01/16/2004    Arthritis     Moe esophagus     Esophageal cancer [facility]) Nov 2023    GERD (gastroesophageal reflux disease)     Hiatal hernia     Infectious gastroenteritis 12/11/2012    Morbid obesity with BMI of 40.0-44.9, adult ([address] [facility])     Osteoarthritis Pilonidal cyst with abscess 01/16/2004    none since 2015    PONV (postoperative nausea and vomiting)     Seasonal allergies     Slipped capital femoral epiphysis     left-surgical 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    Snores     Wears glasses      Past Surgical History:   Procedure Laterality Date    COLONOSCOPY  June 2020    EGD AND COLONOSCOPY  2020    FRACTURE SURGERY      slipped epiphysis left femur 1893 pins    KNEE ARTHROSCOPY Right 1997    meniscectomy    PILONIDAL CYST / SINUS EXCISION      SLIPPED CAPITAL FEMORAL EPIPHYSIS PINNING Left 06/1983    TONSILLECTOMY      UPPER GASTROINTESTINAL ENDOSCOPY  June 2020, Nov 2023    VASECTOMY  2008    WISDOM TOOTH EXTRACTION       Family History   Problem Relation Age of Onset    Depression Mother     Hypertension Mother     Diabetes Mother     Arthritis Mother     Breast cancer Mother         1981 2006    Heart disease Father         CABGx3, CAD, pacemaker    Kidney cancer Father 62    Bursitis Brother     Diverticulosis Brother     No Known Problems Son     No Known Problems Daughter     Esophageal cancer Paternal Uncle         around age 70 diagnosis. Social History     Socioeconomic History    Marital status: /Civil Union     Spouse name: Not on file    Number of children: Not on file    Years of education: Not on file    Highest education level: Not on file   Occupational History    Not on file   Tobacco Use    Smoking status: Never     Passive exposure: Past    Smokeless tobacco: Never   Vaping Use    Vaping Use: Never used   Substance and Sexual Activity    Alcohol use:  Yes     Alcohol/week: 3.0 standard drinks of alcohol     Types: 1 Glasses of wine, 2 Cans of beer per week     Comment: Social drinker    Drug use: Never    Sexual activity: Yes     Partners: Female     Birth control/protection: Male Sterilization   Other Topics Concern    Not on file   Social History Narrative    Not on file     Social Determinants of Health     Financial Resource Strain: Not on file   Food Insecurity: Not on file   Transportation Needs: Not on file   Physical Activity: Not on file   Stress: Not on file   Social Connections: Not on file   Intimate Partner Violence: Not on file   Housing Stability: Not on file       Current Outpatient Medications:     albuterol (Ventolin HFA) 90 mcg/act inhaler, Inhale 2 puffs every 6 (six) hours as needed for wheezing, Disp: 18 g, Rfl: 1    montelukast (SINGULAIR) 10 mg tablet, TAKE 1 TABLET DAILY AT BEDTIME, Disp: 90 tablet, Rfl: 3    Multiple Vitamins-Minerals (PRESERVISION AREDS 2+MULTI VIT PO), Take by mouth every morning Last dose 6/14/2020, Disp: , Rfl:     multivitamin (THERAGRAN) TABS, Take 1 tablet by mouth daily [employer] mens multivitamin, Disp: , Rfl:     olopatadine (PATANOL) 0.1 % ophthalmic solution, Administer 1 drop to both eyes 2 (two) times a day (Patient taking differently: Administer 1 drop to both eyes as needed), Disp: 5 mL, Rfl: 2    omeprazole (PriLOSEC) 20 mg delayed release capsule, TAKE 1 CAPSULE DAILY, Disp: 90 capsule, Rfl: 3    psyllium (METAMUCIL) 0.52 g capsule, Take 0.52 g by mouth, Disp: , Rfl:     triamcinolone (KENALOG) 0.1 % cream, Apply topically 2 (two) times a day (Patient not taking: Reported on 12/12/2023), Disp: 80 g, Rfl: 0    Current Facility-Administered Medications:     dexamethasone (DECADRON) injection 4 mg, 4 mg, Infiltration, , Jacob Bates, CHALO, 4 mg at 11/22/23 0845  Allergies   Allergen Reactions    Pollen Extract Other (See Comments)     Itchy burning eyes    Cat Hair Extract      Animal dander-triggers asthma     Vitals:    12/12/23 0922   BP: 130/82   Pulse: 83   Resp: 16   Temp: 97.9 °F (36.6 °C)   SpO2: 99%       Physical Exam   Constitutional: General appearance: The Patient is well-developed and well-nourished who appears the stated age in no acute distress. Patient is pleasant and talkative. HEENT:  Normocephalic. Sclerae are anicteric.  Mucous membranes are moist. Neck is supple without adenopathy. No JVD. Chest: The lungs are clear to auscultation. Cardiac: Heart is regular rate. Abdomen: Abdomen is soft, non-tender, non-distended and without masses. Extremities: There is no clubbing or cyanosis. There is no edema. Symmetric. Neuro: Grossly nonfocal. Gait is normal.     Lymphatic: No evidence of cervical adenopathy bilaterally. No evidence of axillary adenopathy bilaterally. No evidence of inguinal adenopathy bilaterally. Skin: Warm, anicteric. Psych:  Patient is pleasant and talkative. Breasts:      Pathology:  Addendum   RESULTS OF IMMUNOHISTOCHEMICAL ANALYSIS FOR MISMATCH REPAIR PROTEIN LOSS     INTERPRETATION: No loss of nuclear expression of MMR proteins: low probability of MSI-H     RESULTS:  Antibody            Clone                 Description                                  Results  MLH1                 M1                     Mismatch repair protein  Intact nuclear expression  MSH2                [id number]        Mismatch repair protein  Intact nuclear expression  MSH6                SP93                  Mismatch repair protein  Intact nuclear expression  PMS2                 A16                    Mismatch repair protein  Intact nuclear expression     Note: A positive control for each antibody have been reviewed and accepted. These tests were developed, and their performance characteristics determined by [facility]. They may not be cleared or approved by the U.S. Food and Drug Administration. The FDA determined that such clearance or approval is not necessary. These tests are used for clinical purposes. They should not be regarded as investigational or for research. This laboratory has been approved by [place] 88, designated as a high-complexity laboratory and is qualified to perform these tests. Addendum electronically signed by Socorro Allan MD on 11/17/2023 at 10:53 AM   Final Diagnosis   A.  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 [facility] demonstrates the following staining profile in the lesional cells:               Positive: AE1/AE3, CDX2     Note: Ancillary testing for HER2 immunohistochemistry and mismatch repair (MMR) protein deficiency by immunohistochemical panel (MLH1, PMS2, MSH2, MSH6) is undertaken; the results will be issued in a supplemental report, to follow shortly. Electronically signed by Maicol Costello DO on 11/17/2023 at 10:08 AM       Labs:      Imaging  CT chest abdomen pelvis w contrast    Result Date: 12/4/2023  Narrative: CT CHEST, ABDOMEN, AND PELVIS WITH IV CONTRAST INDICATION:   C15.5: Malignant neoplasm of lower third of esophagus. COMPARISON: CT abdomen/pelvis 2/16/2008. TECHNIQUE: CT examination of the chest, abdomen, and pelvis was performed. Axial, sagittal, and coronal 2D reformatted images were created from the source data and submitted for interpretation. Radiation dose length product (DLP) for this visit:  1950.06 mGy-cm . This examination, like all CT scans performed in the [facility], was performed utilizing techniques to minimize radiation dose exposure, including the use of iterative reconstruction and automated exposure control. IV Contrast:  100 mL of iohexol (OMNIPAQUE) Enteric Contrast: Enteric contrast was administered. FINDINGS: CHEST: LARGE AIRWAYS: Large airways are clear with no tracheal or endobronchial lesion. LUNGS: Lungs are clear. No consolidation or pulmonary edema. PLEURA: No pleural effusion or pneumothorax. HEART: Normal cardiac size and morphology. Minimal coronary artery calcification. No pericardial effusion or thickening. VESSELS: Normal caliber thoracic aorta with no detectable atherosclerotic plaque. Common origin of the brachiocephalic and left common carotid arteries, a normal variant. No filling defects to suggest pulmonary embolism in the main or lobar pulmonary arteries. MEDIASTINUM AND JAIME: Within normal limits. No lymphadenopathy. CHEST WALL AND LOWER NECK:   Within normal limits. ABDOMEN: LIVER: Normal size and morphology. Diffuse hepatic steatosis. No suspicious mass. BILIARY: No intrahepatic biliary ductal dilatation. Normal caliber common bile duct. GALLBLADDER: No calcified gallstones. Normal wall thickness. No pericholecystic inflammatory changes. SPLEEN:  Within normal limits. No suspicious lesion. Normal spleen size. PANCREAS: Pancreatic parenchyma is within normal limits. No main pancreatic ductal dilatation. No pancreatic/peripancreatic inflammation. ADRENAL GLANDS: Within normal limits. KIDNEYS/URETERS: Normal kidney size and position with symmetric enhancement. No calculi. No hydronephrosis. Normal ureters. There is a 1.1 cm exophytic lesion arising from the posterior upper pole of the left kidney (series 2/129). STOMACH AND BOWEL: Stomach is grossly within normal limits. Normal caliber small bowel. Normal caliber large bowel. Colonic diverticulosis without acute diverticulitis. APPENDIX: Normal air-filled appendix. ABDOMINOPELVIC CAVITY: No ascites. No intraperitoneal free air. No lymphadenopathy. No retroperitoneal hematoma. VESSELS: Normal caliber abdominal aorta with no detectable atherosclerotic plaque. The celiac, SMA, and JOSEFINA are patent. The main, right, and left portal veins are patent. The SMV and splenic vein are patent. The hepatic veins are patent. PELVIS REPRODUCTIVE ORGANS: Normal prostate. Symmetric seminal vesicles. URINARY BLADDER: Within normal limits. No calculi. ABDOMINAL WALL/INGUINAL REGIONS: Small bilateral fat-containing inguinal hernias. BONES: Mild multilevel degenerative changes in the spine. Vertebral body height is maintained. No acute fracture or destructive osseous lesion. 3 screw fixation left femoral neck. Impression: 1.   No evidence of metastatic disease in the chest, abdomen, or pelvis. 2.  Indeterminate 11 mm left renal lesion likely represents a proteinaceous/hemorrhagic cyst. Recommend renal ultrasound for confirmation. The study was marked in EPIC for significant notification. Workstation performed: [id number]     XR foot 3+ vw left    Result Date: 11/25/2023  Narrative: LEFT FOOT INDICATION:   M79.671: Pain in right foot M79.672: Pain in left foot. COMPARISON: 6/10/2022 VIEWS:  XR FOOT 3+ VW LEFT FINDINGS: There is no acute fracture or dislocation. Pes planus. Moderate midfoot DJD. Metatarsal adductus. No lytic or blastic osseous lesion. Soft tissues are unremarkable. Impression: No acute osseous abnormality. Pes planus. Moderate midfoot DJD. Metatarsal adductus. Workstation performed: [id number]     XR foot 3+ vw right    Result Date: 11/25/2023  Narrative: RIGHT FOOT INDICATION:   M79.671: Pain in right foot M79.672: Pain in left foot. COMPARISON:  None VIEWS:  XR FOOT 3+ VW RIGHT FINDINGS: There is no acute fracture or dislocation. Pes planus. Metatarsal adductus. Moderate midfoot DJD. No lytic or blastic osseous lesion. Soft tissues are unremarkable. Impression: No acute osseous abnormality. Pes planus. Metatarsal adductus. Moderate midfoot DJD. Workstation performed: [id number]     EGD    Result Date: 11/14/2023  Narrative: Table formatting from the original result was not included. [address] [place] [address],[address] [address] [facility] [address] [phone] DATE OF SERVICE: 11/14/23 PHYSICIAN(S): Attending: Anthony Melgoza MD Fellow: No Staff Documented INDICATION: Gastroesophageal reflux disease without esophagitis, Moe's esophagus without dysplasia POST-OP DIAGNOSIS: See the impression below. PREPROCEDURE: Informed consent was obtained for the procedure, including sedation. Risks of perforation, hemorrhage, adverse drug reaction and aspiration were discussed.  The patient was placed in the left lateral decubitus position. Patient was explained about the risks and benefits of the procedure. Risks including but not limited to bleeding, infection, and perforation were explained in detail. Also explained about less than 100% sensitivity with the exam and other alternatives. PROCEDURE: EGD DETAILS OF PROCEDURE: Patient was taken to the procedure room where a time out was performed to confirm correct patient and correct procedure. The patient underwent monitored anesthesia care, which was administered by an anesthesia professional. The patient's blood pressure, heart rate, level of consciousness, respirations and oxygen were monitored throughout the procedure. The scope was advanced to the second part of the duodenum. Retroflexion was performed in the fundus. The patient experienced no blood loss. The procedure was not difficult. The patient tolerated the procedure well. There were no apparent adverse events. ANESTHESIA INFORMATION: ASA: II Anesthesia Type: IV Sedation with Anesthesia MEDICATIONS: No administrations occurring from 0725 to 0745 on 11/14/23 FINDINGS: 3 cm hiatal hernia - GE junction 38 cm from the incisors, diaphragmatic impression 41 cm from the incisors Distal esophagus-irregular columnar mucosal extensions extending for about 3 cm from the GE junction were biopsied for Moe's The stomach and duodenum appeared normal. SPECIMENS: ID Type Source Tests Collected by Time Destination 1 : Bx Distal Esophagus Tissue Esophagus TISSUE EXAM Anthony Melgoza MD 11/14/2023  7:42 AM      Impression: 3 cm hiatal hernia The stomach and duodenum appeared normal. RECOMMENDATION:  Await pathology results  Continue omeprazole    Anthony Melgoza MD     I personally reviewed and interpreted the above laboratory and imaging data. Discussion/Summary: 79-year-old male with adenocarcinoma that has arisen and Moe's esophagus. This is likely a T1 N0 M0 tumor. We had a long discussion regarding treatment options.   We discussed that if this was a T1 a lesion, his risk of philipp metastasis would be under 5%, and EMR would likely be curative. If this was a T1b lesion, esophagectomy would be the main treatment. If this is T2 or greater or if there is any philipp disease, neoadjuvant chemoradiation followed by surgery and possible adjuvant immunotherapy would be recommended. At this time, I would await the results of the EUS and the PET/CT. Based on these results, determination would be made on the next step. Hopefully, if EUS sees this as a T1a lesion, EMR can be performed at that time. He is agreeable to this plan. I will see him back in 1 months once we have all these results to finalize our treatment plan. All of his questions were answered.

## 2023-12-12 NOTE — PROGRESS NOTES
Jarett Still. Jose D Ortiz (1970) was identified as a potential candidate for the [employer] 2021-05 clinical research study. Pt was seen by Dr. Beatrice España for his newly diagnosed esophageal adenocarcinoma at the [facility] on 12/12/2023. The risks, benefits, procedures, and alternatives of the study were discussed with the patient and the patient agreed to study participation. The patient was  accompanied by his wife. The details of the ICF were reviewed in its entirety with the patient and he and his wife were given the opportunity to ask questions. The patient and clinical research coordinator signed the ICF on 12/12/23  at 10 am. W-9 was completed. Signed copies of the ICF and W-9 were made and given to the patient. CRC contact information was given to the patient and instructed to call with any questions or concerns. After the consent was signed, blood was drawn for this study by Remi Camacho. Subject is number [id number] on the study. Documentation of Informed Consent Process for Clinical Research Study     Study Title: [employer] 2021-05  Patient Name: Ygnacio Barthel  Date Of Birth: [birth year]     This signed and dated document shall serve as certification that all of the below listed required elements of informed consent were provided to the subject or legally authorized representative signing the actual Informed Consent Document, both in written and verbal format. The HIPAA consent is contained within the Informed Consent document and has also been discussed. A copy of the actual signed and dated Informed Consent has also been provided to the subject or legally authorized representative, and the original signed document shall be maintained in the research shadow chart.      Element of Informed Consent Discussed Date Initials/ Person obtaining consent   A statement that the study involves research, an explanation of the purposes of the research and the expected duration of the subject's participation, a description of the procedures to be followed, and identification of any procedures which are experimental 12/12/2023 CS   A description of any reasonably foreseeable risks or discomforts to the subject. 12/12/2023 CS   A description of any benefits to the subject or to others which may reasonably be expected from the research. 12/12/2023 CS   A disclosure of appropriate alternative procedures or courses of treatment, if any, that might be advantageous to the subject. 12/12/2023 CS   A statement describing the extent, if any, to which confidentiality of records identifying the subject will be maintained and that notes the possibility that the Food and Drug Administration may inspect the records 12/12/2023 CS   For research involving more than minimal risk, an explanation as to whether any compensation and an explanation as to whether any medical treatments are available if injury occurs and, if so, what they consist of, or where further information may be obtained. 12/12/2023 CS   An explanation of whom to contact for answers to pertinent questions about the research and research subjects' rights, and whom to contact in the event of a research-related injury to the subject. 12/12/2023 CS   A statement that participation is voluntary, that refusal to participate will involve no penalty or loss of benefits to which the subject is otherwise entitled, and that the subject may discontinue participation at any time without penalty or loss of benefits to which the subject is otherwise entitled. 12/12/2023 CS   A statement that the particular treatment or procedure may involve risks to the subject (or to the embryo or fetus, if the subject is or may become pregnant) which are currently unforeseeable 12/12/2023 CS   Anticipated circumstances under which the subject's participation may be terminated by the investigator without regard to the subject's consent.  12/12/2023 CS   Any additional costs to the subject that may result from participation in the research 12/12/2023 CS   The consequences of a subjects' decision to withdraw from the research and procedures for orderly termination of participation by the subject. 12/12/2023 CS   A statement that significant new findings developed during the course of the research which may relate to the subject's willingness to continue participation will be provided to the subject. 12/12/2023 CS   The approximate number of subjects involved in the study.  12/12/2023 CS

## 2023-12-14 ENCOUNTER — PATIENT OUTREACH (OUTPATIENT)
Dept: CASE MANAGEMENT | Facility: OTHER | Age: 53
End: 2023-12-14

## 2023-12-14 NOTE — PROGRESS NOTES
OSW placed outreach TC to pt this morning. He was on a work call and unable to speak at this time. Pt states he is off next week and OSW offered to reach out then and he was agreeable.

## 2023-12-21 ENCOUNTER — PATIENT OUTREACH (OUTPATIENT)
Dept: CASE MANAGEMENT | Facility: OTHER | Age: 53
End: 2023-12-21

## 2023-12-21 ENCOUNTER — ANESTHESIA (OUTPATIENT)
Dept: GASTROENTEROLOGY | Facility: HOSPITAL | Age: 53
End: 2023-12-21

## 2023-12-21 ENCOUNTER — HOSPITAL ENCOUNTER (OUTPATIENT)
Dept: GASTROENTEROLOGY | Facility: HOSPITAL | Age: 53
Setting detail: OUTPATIENT SURGERY
End: 2023-12-21
Attending: INTERNAL MEDICINE
Payer: COMMERCIAL

## 2023-12-21 ENCOUNTER — ANESTHESIA EVENT (OUTPATIENT)
Dept: GASTROENTEROLOGY | Facility: HOSPITAL | Age: 53
End: 2023-12-21

## 2023-12-21 VITALS
HEART RATE: 70 BPM | HEIGHT: 74 IN | SYSTOLIC BLOOD PRESSURE: 131 MMHG | TEMPERATURE: 98.3 F | BODY MASS INDEX: 37.35 KG/M2 | RESPIRATION RATE: 16 BRPM | OXYGEN SATURATION: 95 % | DIASTOLIC BLOOD PRESSURE: 59 MMHG | WEIGHT: 291 LBS

## 2023-12-21 DIAGNOSIS — C15.5 MALIGNANT NEOPLASM OF LOWER THIRD OF ESOPHAGUS (HCC): ICD-10-CM

## 2023-12-21 PROCEDURE — 88342 IMHCHEM/IMCYTCHM 1ST ANTB: CPT | Performed by: STUDENT IN AN ORGANIZED HEALTH CARE EDUCATION/TRAINING PROGRAM

## 2023-12-21 PROCEDURE — 88309 TISSUE EXAM BY PATHOLOGIST: CPT | Performed by: STUDENT IN AN ORGANIZED HEALTH CARE EDUCATION/TRAINING PROGRAM

## 2023-12-21 PROCEDURE — 43254 EGD ENDO MUCOSAL RESECTION: CPT | Performed by: INTERNAL MEDICINE

## 2023-12-21 PROCEDURE — 43237 ENDOSCOPIC US EXAM ESOPH: CPT | Performed by: INTERNAL MEDICINE

## 2023-12-21 RX ORDER — PROPOFOL 10 MG/ML
INJECTION, EMULSION INTRAVENOUS AS NEEDED
Status: DISCONTINUED | OUTPATIENT
Start: 2023-12-21 | End: 2023-12-21

## 2023-12-21 RX ORDER — FENTANYL CITRATE 50 UG/ML
INJECTION, SOLUTION INTRAMUSCULAR; INTRAVENOUS AS NEEDED
Status: DISCONTINUED | OUTPATIENT
Start: 2023-12-21 | End: 2023-12-21

## 2023-12-21 RX ORDER — SODIUM CHLORIDE, SODIUM LACTATE, POTASSIUM CHLORIDE, CALCIUM CHLORIDE 600; 310; 30; 20 MG/100ML; MG/100ML; MG/100ML; MG/100ML
INJECTION, SOLUTION INTRAVENOUS CONTINUOUS PRN
Status: DISCONTINUED | OUTPATIENT
Start: 2023-12-21 | End: 2023-12-21

## 2023-12-21 RX ORDER — MIDAZOLAM HYDROCHLORIDE 2 MG/2ML
INJECTION, SOLUTION INTRAMUSCULAR; INTRAVENOUS AS NEEDED
Status: DISCONTINUED | OUTPATIENT
Start: 2023-12-21 | End: 2023-12-21

## 2023-12-21 RX ORDER — PROPOFOL 10 MG/ML
INJECTION, EMULSION INTRAVENOUS CONTINUOUS PRN
Status: DISCONTINUED | OUTPATIENT
Start: 2023-12-21 | End: 2023-12-21

## 2023-12-21 RX ADMIN — MIDAZOLAM HYDROCHLORIDE 2 MG: 1 INJECTION, SOLUTION INTRAMUSCULAR; INTRAVENOUS at 08:09

## 2023-12-21 RX ADMIN — SODIUM CHLORIDE, SODIUM LACTATE, POTASSIUM CHLORIDE, AND CALCIUM CHLORIDE: .6; .31; .03; .02 INJECTION, SOLUTION INTRAVENOUS at 08:08

## 2023-12-21 RX ADMIN — PROPOFOL 80 MG: 10 INJECTION, EMULSION INTRAVENOUS at 08:22

## 2023-12-21 RX ADMIN — PROPOFOL 130 MCG/KG/MIN: 10 INJECTION, EMULSION INTRAVENOUS at 08:22

## 2023-12-21 RX ADMIN — FENTANYL CITRATE 25 MCG: 50 INJECTION, SOLUTION INTRAMUSCULAR; INTRAVENOUS at 08:22

## 2023-12-21 RX ADMIN — FENTANYL CITRATE 25 MCG: 50 INJECTION, SOLUTION INTRAMUSCULAR; INTRAVENOUS at 08:25

## 2023-12-21 RX ADMIN — FENTANYL CITRATE 50 MCG: 50 INJECTION, SOLUTION INTRAMUSCULAR; INTRAVENOUS at 08:17

## 2023-12-21 NOTE — ANESTHESIA POSTPROCEDURE EVALUATION
Post-Op Assessment Note    CV Status:  Stable    Pain management: adequate       Mental Status:  Sleepy   Hydration Status:  Euvolemic   PONV Controlled:  Controlled   Airway Patency:  Patent     Post Op Vitals Reviewed: Yes      Staff: CRNA               BP   101/62   Temp 97.1   Pulse 64   Resp 10   SpO2 98

## 2023-12-21 NOTE — ANESTHESIA PREPROCEDURE EVALUATION
Procedure:  ENDOSCOPIC ULTRASOUND (UPPER)    Relevant Problems   GI/HEPATIC   (+) Adenocarcinoma of esophagus (HCC)   (+) Gastroesophageal reflux disease      MUSCULOSKELETAL   (+) Chronic right-sided low back pain with right-sided sciatica      NEURO/PSYCH   (+) Chronic right-sided low back pain with right-sided sciatica      PONV  BMI 37    Physical Exam    Airway    Mallampati score: II  TM Distance: >3 FB  Neck ROM: full     Dental       Cardiovascular  Cardiovascular exam normal    Pulmonary  Pulmonary exam normal     Other Findings        Anesthesia Plan  ASA Score- 3     Anesthesia Type- IV sedation with anesthesia with ASA Monitors.         Additional Monitors:     Airway Plan:            Plan Factors-    Chart reviewed. EKG reviewed.  Existing labs reviewed. Patient summary reviewed.                  Induction- intravenous.    Postoperative Plan-     Informed Consent- Anesthetic plan and risks discussed with patient.  I personally reviewed this patient with the CRNA. Discussed and agreed on the Anesthesia Plan with the CRNA..

## 2023-12-27 ENCOUNTER — NURSE TRIAGE (OUTPATIENT)
Age: 53
End: 2023-12-27

## 2023-12-27 ENCOUNTER — HOSPITAL ENCOUNTER (OUTPATIENT)
Dept: RADIOLOGY | Age: 53
Discharge: HOME | End: 2023-12-27
Payer: COMMERCIAL

## 2023-12-27 DIAGNOSIS — C15.9 ADENOCARCINOMA OF ESOPHAGUS (HCC): ICD-10-CM

## 2023-12-27 LAB — GLUCOSE SERPL-MCNC: 99 MG/DL (ref 65–140)

## 2023-12-27 PROCEDURE — A9552 F18 FDG: HCPCS

## 2023-12-27 PROCEDURE — G1004 CDSM NDSC: HCPCS

## 2023-12-27 PROCEDURE — 78815 PET IMAGE W/CT SKULL-THIGH: CPT

## 2023-12-27 PROCEDURE — 82948 REAGENT STRIP/BLOOD GLUCOSE: CPT

## 2023-12-27 NOTE — TELEPHONE ENCOUNTER
"Reason for Disposition   Information only question and nurse able to answer    Answer Assessment - Initial Assessment Questions  1. REASON FOR CALL or QUESTION: \"What is your reason for calling today?\" or \"How can I best help you?\" or \"What question do you have that I can help answer?\"          Pt. Called for PET scan results, reviewed results with pt., advised that Dr. Merino left a detailed message, which pt. Did listen too , pt. Verbalized understanding on awaiting for EMR results and will follow up with Dr. Merino with additional questions once results are available and reviewed , no further review needed    Protocols used: Information Only Call - No Triage-ADULT-[place]    "

## 2023-12-28 ENCOUNTER — PATIENT OUTREACH (OUTPATIENT)
Dept: CASE MANAGEMENT | Facility: OTHER | Age: 53
End: 2023-12-28

## 2023-12-28 ENCOUNTER — TELEPHONE (OUTPATIENT)
Dept: GASTROENTEROLOGY | Facility: CLINIC | Age: 53
End: 2023-12-28

## 2023-12-28 NOTE — TELEPHONE ENCOUNTER
Scheduled date of EGD with EMR (as of today): 3/15/24  Physician performing EGD:  Dr. Farrell  Location of EGD: [facility]   Instructions reviewed with patient by:  Mima - mailed   Clearances:  n/a

## 2023-12-28 NOTE — TELEPHONE ENCOUNTER
----- Message from Kamron Farrell MD sent at 12/21/2023 12:02 PM EST -----  EGD +/- EMR/[facility] in 1-2 months at [facility].

## 2023-12-28 NOTE — PROGRESS NOTES
Biopsychosocial and Barriers Assessment    Cancer Diagnosis: Esophageal  Home/Cell Phone: [phone]  Emergency Contact: [phone]  Marital Status:   Interpretation concerns, speaks another language, preferred language: English  Cultural concerns: none  Ability to read or write: yes    Caregiver/Support: Strong family support  Children: 1 son and 1 daughter  Child/Elder care: n/a    Housin story  Home Setup: not addressed  Lives With: spouse and daughter  Daily Living Activities: independent  Durable Medical Equipment: none  Ambulation: independent    Preferred Pharmacy: [facility] [place]  High co-pays with insurance: [facility]  High co-pays with medication coverage: none  No medication coverage: n/a    Primary Care Provider: Dr. Mirza  Hx of Home Health Care: none  Hx of Short term rehab: none  Mental Health Hx: none  Substance Abuse Hx: none  Employment: Employed   Status/Location: not addressed  Ability to pay bills: yes  POA/LW/AD: not addressed  Transportation Plan/Concerns: Self      What do you know about your Cancer Diagnosis    What has your doctor told you about your cancer diagnosis: Esophageal Cancer.    What has your doctor told you about your cancer treatment: Pt has upcoming consults with Dr. Castorena and Dr. Man.    What specific concerns do you have about your diagnosis and treatment: None at this time, as there is no plan in place yet.     Have you been made aware of any hair loss associated with treatment: N/A    Additional Comments:  OSW placed outreach TC to pt this morning. OSW introduced self and role. Pt is a pleasant gentleman with a dx of esophageal cancer.  He has upcoming appointments with Dr. Man and Dr. Castorena. Pt states he is well supported by his family. He completed a Dt, where he scored a 1/10. He did not report any concerns at this time. Pt works for [employer], therefore he has a medical background.   OSW  educated on the [facility] and offered to mail out their newsletter. He was agreeable. OSW also educated on the [facility] network, however he declined the need at this time.  OSW encouraged pt to reach out with any needs in the future.

## 2023-12-29 PROCEDURE — 88342 IMHCHEM/IMCYTCHM 1ST ANTB: CPT | Performed by: STUDENT IN AN ORGANIZED HEALTH CARE EDUCATION/TRAINING PROGRAM

## 2023-12-29 PROCEDURE — 88309 TISSUE EXAM BY PATHOLOGIST: CPT | Performed by: STUDENT IN AN ORGANIZED HEALTH CARE EDUCATION/TRAINING PROGRAM

## 2024-01-09 ENCOUNTER — OFFICE VISIT (OUTPATIENT)
Dept: HEMATOLOGY ONCOLOGY | Facility: CLINIC | Age: 54
End: 2024-01-09
Payer: COMMERCIAL

## 2024-01-09 VITALS
OXYGEN SATURATION: 99 % | WEIGHT: 291.2 LBS | TEMPERATURE: 97.9 F | SYSTOLIC BLOOD PRESSURE: 130 MMHG | DIASTOLIC BLOOD PRESSURE: 84 MMHG | HEIGHT: 74 IN | HEART RATE: 88 BPM | BODY MASS INDEX: 37.37 KG/M2 | RESPIRATION RATE: 17 BRPM

## 2024-01-09 DIAGNOSIS — C15.9 ADENOCARCINOMA OF ESOPHAGUS (HCC): ICD-10-CM

## 2024-01-09 PROCEDURE — 99204 OFFICE O/P NEW MOD 45 MIN: CPT | Performed by: INTERNAL MEDICINE

## 2024-01-09 NOTE — PROGRESS NOTES
Oncology Consult Note  Red Bishop 53 y.o. male MRN: [id number]  Unit/Bed#: ? Encounter: [id number]      Presenting Complaint: Intramucosal adenocarcinoma of the distal esophagus    History of Presenting Illness: Red Bishop is seen for initial consultation 1/9/2024 at the referral of Isaiah Merino MD   53-year-old  male with a long history of GERD, hiatal hernia, arthritis, with negative endoscopy 3 years ago, most recent endoscopy on 8/2023 showed 3 cm hiatal hernia, irregular columnar mucosal extension about 3 cm from the GE, biopsy showed high-grade dysplasia arising in the background of intestinal metaplasia, the nodule biopsy showed intramucosal adenocarcinoma moderately differentiated measuring 0.9 cm without any lymphovascular or perineural invasion with extension into the resection margin  CT scan showed no evidence of metastatic disease in the chest abdomen and pelvis  PET scan on 12/2023 showed short segment FDG activity in the distal esophagus, mild focus in the right perihilar area nonspecific  Status post endoscopy ultrasound resection on 11/17/2023, EUS showed no thickening of the esophagus layers suggesting the nodular area is likely mucosal and amenable to endoscopic mucosal resection which was done consistent with stage I (pT1a, N0, M0, grade 2) with additional high-grade glandular dysplasia, HER2 negative, MMR is intact        Review of Systems - As stated in the HPI otherwise the fourteen point review of systems was negative.    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   • Snores    • Wears glasses        Social History     Socioeconomic History   • Marital status: /Civil Union     Spouse name: Not on file   • Number of children: Not on file   • Years of education: Not on file   • Highest education level: Not on file   Occupational History   • Not on file   Tobacco Use   • Smoking status: Never     Passive exposure: Past   • Smokeless tobacco: Never   Vaping Use   • Vaping status: Never Used   Substance and Sexual Activity   • Alcohol use: Yes     Alcohol/week: 3.0 standard drinks of alcohol     Types: 1 Glasses of wine, 2 Cans of beer per week     Comment: Social drinker   • Drug use: Never   • Sexual activity: Yes     Partners: Female     Birth control/protection: Male Sterilization   Other Topics Concern   • Not on file   Social History Narrative   • Not on file     Social Determinants of Health     Financial Resource Strain: Not on file   Food Insecurity: Not on file   Transportation Needs: Not on file   Physical Activity: Not on file   Stress: Not on file (2/11/2021)   Social Connections: Not on file   Intimate Partner Violence: Low Risk  (4/13/2021)    Received from [facility]    Intimate Partner Violence    • Insults You: Not on file    • Threatens You: Not on file    • Screams at You: Not on file    • Physically Hurt: Not on file    • Intimate Partner Violence Score: Not on file   Housing Stability: Not on file       Family History   Problem Relation Age of Onset   • Depression Mother    • Hypertension Mother    • Diabetes Mother    • Arthritis Mother    • Breast cancer Mother         1981 2006   • Heart disease Father         CABGx3, CAD, pacemaker   • Kidney cancer Father 57   • Bursitis Brother    • Diverticulosis Brother    • No Known Problems Son    • No Known Problems Daughter    • Esophageal cancer Paternal Uncle         around age 71 diagnosis.       Allergies   Allergen Reactions   •  "Pollen Extract Other (See Comments)     Itchy burning eyes   • Cat Hair Extract      Animal dander-triggers asthma         Current Outpatient Medications:   •  albuterol (Ventolin HFA) 90 mcg/act inhaler, Inhale 2 puffs every 6 (six) hours as needed for wheezing, Disp: 18 g, Rfl: 1  •  diphenhydrAMINE-acetaminophen (Tylenol PM Extra Strength)  MG TABS, , Disp: , Rfl:   •  montelukast (SINGULAIR) 10 mg tablet, TAKE 1 TABLET DAILY AT BEDTIME, Disp: 90 tablet, Rfl: 3  •  Multiple Vitamins-Minerals (PRESERVISION AREDS 2+MULTI VIT PO), Take by mouth every morning Last dose 6/14/2020, Disp: , Rfl:   •  multivitamin (THERAGRAN) TABS, Take 1 tablet by mouth daily [employer] mens multivitamin, Disp: , Rfl:   •  olopatadine (PATANOL) 0.1 % ophthalmic solution, Administer 1 drop to both eyes 2 (two) times a day (Patient taking differently: Administer 1 drop to both eyes as needed), Disp: 5 mL, Rfl: 2  •  omeprazole (PriLOSEC) 20 mg delayed release capsule, TAKE 1 CAPSULE DAILY, Disp: 90 capsule, Rfl: 3  •  psyllium (METAMUCIL) 0.52 g capsule, Take 0.52 g by mouth, Disp: , Rfl:     Current Facility-Administered Medications:   •  dexamethasone (DECADRON) injection 4 mg, 4 mg, Infiltration, , Jacob Bates, DPM, 4 mg at 11/22/23 0845      /84 (BP Location: Left arm, Patient Position: Sitting, Cuff Size: Adult)   Pulse 88   Temp 97.9 °F (36.6 °C) (Temporal)   Resp 17   Ht 6' 2\" (1.88 m)   Wt 132 kg (291 lb 3.2 oz)   SpO2 99%   BMI 37.39 kg/m²       General Appearance:    Alert, oriented        Eyes:    PERRL   Ears:    Normal external ear canals, both ears   Nose:   Nares normal, septum midline   Throat:   Mucosa moist. Pharynx without injection.    Neck:   Supple       Lungs:     Clear to auscultation bilaterally   Chest Wall:    No tenderness or deformity    Heart:    Regular rate and rhythm       Abdomen:     Soft, non-tender, bowel sounds +, no organomegaly           Extremities:   Extremities no cyanosis or edema "       Skin:   no rash or icterus.    Lymph nodes:   Cervical, supraclavicular, and axillary nodes normal   Neurologic:   CNII-XII intact, normal strength, sensation and reflexes     Throughout               No results found for this or any previous visit (from the past 48 hour(s)).      NM PET CT skull base to mid thigh    Result Date: 12/27/2023  Narrative: PET/CT SCAN INDICATION: Newly diagnosed esophageal cancer. C15.9: Malignant neoplasm of esophagus, unspecified MODIFIER: PI COMPARISON: CT chest abdomen pelvis 11/29/2023 CELL TYPE: Adenocarcinoma of the distal esophagus TECHNIQUE:   10.6 mCi F-18-FDG administered IV. Multiplanar attenuation corrected and non attenuation corrected PET images are available for interpretation, and contiguous, low dose, axial CT sections were obtained from the skull base through the femurs.  Intravenous contrast material was not utilized. This examination, like all CT scans performed in the [facility], was performed utilizing techniques to minimize radiation dose exposure, including the use of iterative reconstruction and automated exposure control. Fasting serum glucose: 99 mg/dl FINDINGS: VISUALIZED BRAIN: No acute abnormalities are seen. HEAD/NECK: There is a physiologic distribution of FDG. No FDG avid cervical adenopathy is seen. CT images: Unremarkable. CHEST: Short segment of FDG uptake at the distal esophagus/GE junction, SUV max 4.2. There may be slight soft tissue fullness suggested on CT. Based on PET images this measures on the order of 2.6 cm in length. Mild focus of radiotracer uptake in the right perihilar region, SUV max of 2.4. No FDG-avid mediastinal or left perihilar foci. CT images: No additional significant findings. ABDOMEN: No FDG avid soft tissue lesions are seen. There is physiologic bowel and genitourinary activity. Limited assessment for previously seen 1.1 cm left renal upper pole lesion due to normal physiologic activity in the kidneys.  CT images: Diffuse fatty infiltration of the liver. PELVIS: No FDG avid soft tissue lesions are seen. CT images: Unremarkable. OSSEOUS STRUCTURES: No FDG avid lesions are seen. CT images: Left hip fixation hardware.     Impression: 1. Short segment of FDG uptake at the distal esophagus/GE junction compatible with the known malignancy. 2. Mild focus of uptake in the right perihilar region, nonspecific, could be reactive but can be reassessed on follow-up. Otherwise no findings for hypermetabolic metastasis. Workstation performed: [place]     Endoscopic ultrasonography, GI (Upper)    Result Date: 12/21/2023  Narrative: Table formatting from the original result was not included. [facility] [address] [place] [id number] [phone] DATE OF SERVICE: 12/21/23 PHYSICIAN(S): Attending: Kamron Farrell Fellow: No Staff Documented INDICATION: Malignant neoplasm of lower third of esophagus (HCC) POST-OP DIAGNOSIS: See the impression below. PREPROCEDURE: Informed consent was obtained for the procedure, including sedation.  Risks of perforation, hemorrhage, adverse drug reaction and aspiration were discussed. The patient was placed in the left lateral decubitus position. Patient was explained about the risks and benefits of the procedure. Risks including but not limited to bleeding, infection, and perforation were explained in detail. Also explained about less than 100% sensitivity with the exam and other alternatives. PROCEDURE: EUS UPPER DETAILS OF PROCEDURE: Patient was taken to the procedure room where a time out was performed to confirm correct patient and correct procedure. The patient underwent monitored anesthesia care, which was administered by an anesthesia professional. The patient's blood pressure, heart rate, oxygen, respirations, level of consciousness and ECG were monitored throughout the procedure. The endoscope and radial scope were introduced through the mouth and advanced to  the second part of the duodenum. Retroflexion was performed in the fundus. The patient experienced no blood loss. The procedure was not difficult. The patient tolerated the procedure well. There were no apparent adverse events. ANESTHESIA INFORMATION: ASA: III Anesthesia Type: IV Sedation with Anesthesia MEDICATIONS: No administrations occurring from 0810 to 0848 on 12/21/23 FINDINGS: EGD C2M3 Moe's esophagus observed with 3 tongues and an associated lesion. The diaphragmatic impression was 43 cm from the incisors, Z-line was 38 cm from the incisors and the top of gastric folds was 40 cm from the incisors. 1 island was noted. The most proximal island was 37 cm from the incisors. The total length of Moe's esophagus was 1 cm; electronic chromoendoscopy (NBI) was used. The stomach, duodenal bulb and 2nd part of the duodenum appeared normal. EUS Radial echoendoscope was used for this exam.  The echoendoscope was advanced to the GE junction at the area of nodule mucosa around 39 cm from the incisors.  There was no thickening of the esophageal layers consistent with known adenocarcinoma. This suggests the nodular area is likely mucosal and amenable to endoscopic mucosal resection. EMR The Duette kit was attached to the upper endoscope.  Clear-cut demarcatio nwas performed with high definition white light endoscopy.  There was an area of aberrent pit pattern and was slightly nodular in appearance between the 12o'clock and 3 o'clock position, likely corresponding with previously biopsied adenocarcinoma. This area was encompassed with the banding kit and a band was deployed.  The nodular area was removed using hot snare. There was a small residual area of nodular mucosa at the distal tip of the resection area so this area was also banded and removed using hot snare polypectomy via EMR technique.  There was a large 3 cm are with mucosal void but healthy appearing margins. SPECIMENS: ID Type Source Tests Collected by  Time Destination 1 : Hot snare Polypectomy Espophageal Nodule Tissue Esophagus TISSUE EXAM Kamron Farrell MD 12/21/2023  8:45 AM  2 : Hot Snare Polypectomy Esophageal Nodule Tissue Esophagus TISSUE EXAM Kamron Farrell MD 12/21/2023  8:46 AM       Impression: Nodular area of mucosa s/p EUS revealing only mucosal involvement. Resected using endoscopic mucosal resection technique. RECOMMENDATION: Follow up biopsy results Repeat EGD with biopsy/EMR in 2 months      ECOG :0      Assessment and plan:  Stage I intramucosal adenocarcinoma of the distal esophagus in the background of metaplasia from chronic GERD on EGD    It was 0.9 cm intramucosal, EUS showed no evidence of thickening of the esophagus there is no evidence of periesophageal lymphadenopathy by imaging studies including CT scan as well as PET scan    No evidence of lymphovascular invasion or perineural invasion    Status post complete resection for T1 disease by endoscopy    Patient to undergo EGD every 3 to 4 months for the first year by GI service    No need for additional treatment from medical oncology point of view    MMR intact, HER2 negative

## 2024-01-10 ENCOUNTER — TELEPHONE (OUTPATIENT)
Dept: HEMATOLOGY ONCOLOGY | Facility: CLINIC | Age: 54
End: 2024-01-10

## 2024-01-10 NOTE — TELEPHONE ENCOUNTER
Patient cancelled his appt. With Dr. Mon for 1/12/24 because per Dr. Castorena, he advised patient to cancel the appt. And reassess after a f/u endoscopy scheduled on 3/15/24.

## 2024-01-29 ENCOUNTER — TELEPHONE (OUTPATIENT)
Age: 54
End: 2024-01-29

## 2024-01-29 NOTE — TELEPHONE ENCOUNTER
Patients GI provider:  Dr. Farrell    Number to return call: [phone]    Reason for call: Pt called to reschedule EGD due to conflict in pt's schedule. Pt is now scheduled for  3/21/2024 with Dr Farrell at [facility].    Scheduled procedure/appointment date if applicable:3/21/2024

## 2024-02-01 NOTE — TELEPHONE ENCOUNTER
Scheduled date of EGD with EMR (as of today):03/20/2024  Physician performing EGD with EMR: JESSICA  Location of EGD with EMR: [facility]  Instructions reviewed with patient by: Lizy and sent in the mail  Clearances:   NONE    Called patient and informed of change waiting for confirmation.

## 2024-03-18 DIAGNOSIS — K21.9 GASTROESOPHAGEAL REFLUX DISEASE, UNSPECIFIED WHETHER ESOPHAGITIS PRESENT: ICD-10-CM

## 2024-03-18 RX ORDER — OMEPRAZOLE 20 MG/1
CAPSULE, DELAYED RELEASE ORAL
Qty: 90 CAPSULE | Refills: 1 | Status: SHIPPED | OUTPATIENT
Start: 2024-03-18

## 2024-03-20 ENCOUNTER — HOSPITAL ENCOUNTER (OUTPATIENT)
Dept: GASTROENTEROLOGY | Facility: HOSPITAL | Age: 54
Setting detail: OUTPATIENT SURGERY
Discharge: HOME | End: 2024-03-20
Attending: INTERNAL MEDICINE
Payer: COMMERCIAL

## 2024-03-20 ENCOUNTER — ANESTHESIA (OUTPATIENT)
Dept: GASTROENTEROLOGY | Facility: HOSPITAL | Age: 54
End: 2024-03-20

## 2024-03-20 ENCOUNTER — ANESTHESIA EVENT (OUTPATIENT)
Dept: GASTROENTEROLOGY | Facility: HOSPITAL | Age: 54
End: 2024-03-20

## 2024-03-20 VITALS
SYSTOLIC BLOOD PRESSURE: 115 MMHG | HEART RATE: 75 BPM | OXYGEN SATURATION: 95 % | TEMPERATURE: 97 F | RESPIRATION RATE: 16 BRPM | DIASTOLIC BLOOD PRESSURE: 92 MMHG

## 2024-03-20 DIAGNOSIS — C15.5 MALIGNANT NEOPLASM OF LOWER THIRD OF ESOPHAGUS (HCC): ICD-10-CM

## 2024-03-20 PROCEDURE — 88313 SPECIAL STAINS GROUP 2: CPT | Performed by: PATHOLOGY

## 2024-03-20 PROCEDURE — 88307 TISSUE EXAM BY PATHOLOGIST: CPT | Performed by: PATHOLOGY

## 2024-03-20 RX ORDER — SODIUM CHLORIDE 9 MG/ML
INJECTION, SOLUTION INTRAVENOUS CONTINUOUS PRN
Status: DISCONTINUED | OUTPATIENT
Start: 2024-03-20 | End: 2024-03-20

## 2024-03-20 RX ORDER — LIDOCAINE HYDROCHLORIDE 20 MG/ML
INJECTION, SOLUTION EPIDURAL; INFILTRATION; INTRACAUDAL; PERINEURAL AS NEEDED
Status: DISCONTINUED | OUTPATIENT
Start: 2024-03-20 | End: 2024-03-20

## 2024-03-20 RX ORDER — PROPOFOL 10 MG/ML
INJECTION, EMULSION INTRAVENOUS AS NEEDED
Status: DISCONTINUED | OUTPATIENT
Start: 2024-03-20 | End: 2024-03-20

## 2024-03-20 RX ADMIN — PROPOFOL 100 MG: 10 INJECTION, EMULSION INTRAVENOUS at 09:24

## 2024-03-20 RX ADMIN — PROPOFOL 100 MG: 10 INJECTION, EMULSION INTRAVENOUS at 09:15

## 2024-03-20 RX ADMIN — SODIUM CHLORIDE: 0.9 INJECTION, SOLUTION INTRAVENOUS at 08:58

## 2024-03-20 RX ADMIN — LIDOCAINE HYDROCHLORIDE 100 MG: 20 INJECTION, SOLUTION EPIDURAL; INFILTRATION; INTRACAUDAL; PERINEURAL at 09:13

## 2024-03-20 RX ADMIN — PROPOFOL 50 MG: 10 INJECTION, EMULSION INTRAVENOUS at 09:19

## 2024-03-20 RX ADMIN — PROPOFOL 50 MG: 10 INJECTION, EMULSION INTRAVENOUS at 09:20

## 2024-03-20 RX ADMIN — PROPOFOL 50 MG: 10 INJECTION, EMULSION INTRAVENOUS at 09:17

## 2024-03-20 RX ADMIN — PROPOFOL 100 MG: 10 INJECTION, EMULSION INTRAVENOUS at 09:13

## 2024-03-20 NOTE — ANESTHESIA PREPROCEDURE EVALUATION
Procedure:  EGD    Relevant Problems   GI/HEPATIC   (+) Adenocarcinoma of esophagus (HCC)   (+) Gastroesophageal reflux disease      MUSCULOSKELETAL   (+) Chronic right-sided low back pain with right-sided sciatica      NEURO/PSYCH   (+) Chronic right-sided low back pain with right-sided sciatica        Physical Exam    Airway    Mallampati score: I         Dental       Cardiovascular      Pulmonary      Other Findings        Anesthesia Plan  ASA Score- 2     Anesthesia Type- IV sedation with anesthesia with ASA Monitors.         Additional Monitors:     Airway Plan:            Plan Factors-Exercise tolerance (METS): >4 METS.    Chart reviewed. EKG reviewed.  Existing labs reviewed. Patient summary reviewed.    Patient is not a current smoker.  Patient did not smoke on day of surgery.    Obstructive sleep apnea risk education given perioperatively.        Induction- intravenous.    Postoperative Plan-     Informed Consent- Anesthetic plan and risks discussed with patient and spouse.  I personally reviewed this patient with the CRNA. Discussed and agreed on the Anesthesia Plan with the CRNA..

## 2024-03-20 NOTE — H&P
History and Physical -  [facility]  Red Bishop 53 y.o. male MRN: [id number]                  HPI: Red Bishop is a 53 y.o. year old male who presents for EGD      REVIEW OF SYSTEMS: Per the HPI, and otherwise unremarkable.    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    Snores     Wears glasses      Past Surgical History:   Procedure Laterality Date    COLONOSCOPY  June 2020    EGD AND COLONOSCOPY  2020    FRACTURE SURGERY      slipped epiphysis left femur 1893 pins    KNEE ARTHROSCOPY Right 1997    meniscectomy    PILONIDAL CYST / SINUS EXCISION      SLIPPED CAPITAL FEMORAL EPIPHYSIS PINNING Left 06/1983    TONSILLECTOMY      UPPER GASTROINTESTINAL ENDOSCOPY  June 2020, Nov 2023    VASECTOMY  2008    WISDOM TOOTH EXTRACTION       Social History   Social History     Substance and Sexual Activity   Alcohol Use Yes    Alcohol/week: 3.0 standard drinks of alcohol    Types: 1 Glasses of wine, 2 Cans of beer per week    Comment: Social drinker     Social History     Substance and Sexual Activity   Drug Use Never     Social History     Tobacco Use   Smoking Status Never    Passive exposure: Past   Smokeless Tobacco Never     Family History   Problem Relation Age of Onset    Depression Mother     Hypertension Mother     Diabetes Mother     Arthritis Mother     Breast cancer Mother         1981 2006    Heart disease Father         CABGx3, CAD, pacemaker    Kidney cancer Father 57    Bursitis Brother      Diverticulosis Brother     No Known Problems Son     No Known Problems Daughter     Esophageal cancer Paternal Uncle         around age 71 diagnosis.       Meds/Allergies       Current Outpatient Medications:     montelukast (SINGULAIR) 10 mg tablet    Multiple Vitamins-Minerals (PRESERVISION AREDS 2+MULTI VIT PO)    multivitamin (THERAGRAN) TABS    omeprazole (PriLOSEC) 20 mg delayed release capsule    psyllium (METAMUCIL) 0.52 g capsule    albuterol (Ventolin HFA) 90 mcg/act inhaler    diphenhydrAMINE-acetaminophen (Tylenol PM Extra Strength)  MG TABS    olopatadine (PATANOL) 0.1 % ophthalmic solution  No current facility-administered medications for this encounter.    Allergies   Allergen Reactions    Pollen Extract Other (See Comments)     Itchy burning eyes    Cat Hair Extract      Animal dander-triggers asthma       Objective     /87   Pulse 72   Temp (!) 97.4 °F (36.3 °C)   Resp 16   SpO2 97%       PHYSICAL EXAM    Gen: NAD  Head: NCAT  CV: RRR  CHEST: Clear  ABD: soft, NT/ND  EXT: no edema      ASSESSMENT/PLAN:  This is a 53 y.o. year old male here for EGD, and he is stable and optimized for his procedure.

## 2024-03-20 NOTE — ANESTHESIA POSTPROCEDURE EVALUATION
Post-Op Assessment Note    CV Status:  Stable  Pain Score: 0    Pain management: adequate       Mental Status:  Alert and awake   Hydration Status:  Euvolemic   PONV Controlled:  Controlled   Airway Patency:  Patent     Post Op Vitals Reviewed: Yes    No anethesia notable event occurred.    Staff: CRNA               /72 (03/20/24 0932)    Temp (!) 97 °F (36.1 °C) (03/20/24 0932)    Pulse 78 (03/20/24 0932)   Resp 16 (03/20/24 0932)    SpO2 95 % (03/20/24 0932)

## 2024-03-25 ENCOUNTER — DOCUMENTATION (OUTPATIENT)
Dept: HEMATOLOGY ONCOLOGY | Facility: CLINIC | Age: 54
End: 2024-03-25

## 2024-03-25 NOTE — PROGRESS NOTES
In-basket message received from Dr. Farrell to add patient to the [facility] on 4/4/2024. Chart reviewed and prep completed.

## 2024-03-28 ENCOUNTER — TELEPHONE (OUTPATIENT)
Dept: GASTROENTEROLOGY | Facility: CLINIC | Age: 54
End: 2024-03-28

## 2024-03-28 NOTE — TELEPHONE ENCOUNTER
----- Message from Odalys Hernandez PA-C sent at 3/26/2024  1:17 PM EDT -----  Order is in to schedule. Thank you!  ----- Message -----  From: Kamron Farrell MD  Sent: 3/25/2024   8:42 AM EDT  To: Carline Goss; Jero Mon MD; #    Called and spoke to Dr. Elliott (pathologist) and patient to update him on results.  The deep margin of the specimen is negative for dysplasia but there is dysplasia extending to the peripheral magins (difficult to determine grade due to cautery artifact). There are multiple small foci of intramucosal adenocarcinoma identified. Patient is aware.     I will bring him back for EGD with EMR in 1 month (advanced pool please schedule orders have been placed) and would like to add him to multi-disciplinary tumor board discussion.

## 2024-04-01 DIAGNOSIS — J45.20 MILD INTERMITTENT ASTHMA WITHOUT COMPLICATION: ICD-10-CM

## 2024-04-01 DIAGNOSIS — J30.81 ANIMAL DANDER ALLERGY: ICD-10-CM

## 2024-04-01 RX ORDER — MONTELUKAST SODIUM 10 MG/1
TABLET ORAL
Qty: 90 TABLET | Refills: 1 | Status: SHIPPED | OUTPATIENT
Start: 2024-04-01

## 2024-04-15 ENCOUNTER — TELEPHONE (OUTPATIENT)
Age: 54
End: 2024-04-15

## 2024-04-15 NOTE — TELEPHONE ENCOUNTER
Pt. Has re-peat EGD scheduled for 5/24/24, pt. Was told to have re-peat in 1 month from 3/20/24, pt. Is asking if procedure needs to have it done earlier, pt. Also asking if oncology was made aware of his case, please call patient with update when available

## 2024-04-16 NOTE — TELEPHONE ENCOUNTER
Spoke with patient and cannot do tomorrow for procedure. He would like to speak with you directly if you can call him. Thanks

## 2024-04-16 NOTE — TELEPHONE ENCOUNTER
Pt was returning Dr. Farrell's call. Pt would like a call back from Dr. Farrell with questions about his upcoming procedure

## 2024-04-17 NOTE — TELEPHONE ENCOUNTER
Basil from triage calling to see if pt's EGD can be moved up per doc. In Rm 04 at [facility]. Please call pt back to resched for earlier date.

## 2024-04-17 NOTE — TELEPHONE ENCOUNTER
Tried calling patient again this morning and just now. Left a message. We should try to expedite his procedure. If we can do next week while I am inpatient that would be preferred.

## 2024-04-17 NOTE — TELEPHONE ENCOUNTER
Pt reports he and  have been playing phone tag and just missed another call. I relayed 's message regarding expediting his EGD 05/24 in RM 04 to the week of 04/22. Pt agreeable. I contacted Padmaja, with scheduling, however she is unable to schedule advanced procedures. Pt requested to speak with  directly. I sent a TigerText to provider to check for availability and included pt's contact number.  replied that he was. Provider texted back and stated he spoke with pt and answered all questions.

## 2024-04-18 NOTE — TELEPHONE ENCOUNTER
Message sent to the gi lab to see if we can move procedure up to next week. Waiting for a reply back.

## 2024-04-18 NOTE — TELEPHONE ENCOUNTER
Patient calling office for an update on moving EMR to next week. Did inform patient that the office is currently working with the gi lab to see if the procedure can be moved up. Patient will be contacted once an appointment date is available. Patient understood and had no further questions or concerns.

## 2024-04-19 NOTE — TELEPHONE ENCOUNTER
Procedure has been moved up to 4/25/24 with Dr. Farrell at the [facility]. Alannah in the gi lab approved case.

## 2024-04-19 NOTE — TELEPHONE ENCOUNTER
2nd message sent to the gi lab regarding status of moving up procedure and if it can be done next week or not.

## 2024-04-25 ENCOUNTER — ANESTHESIA (OUTPATIENT)
Dept: GASTROENTEROLOGY | Facility: HOSPITAL | Age: 54
End: 2024-04-25

## 2024-04-25 ENCOUNTER — ANESTHESIA EVENT (OUTPATIENT)
Dept: GASTROENTEROLOGY | Facility: HOSPITAL | Age: 54
End: 2024-04-25

## 2024-04-25 ENCOUNTER — HOSPITAL ENCOUNTER (OUTPATIENT)
Dept: GASTROENTEROLOGY | Facility: HOSPITAL | Age: 54
Setting detail: OUTPATIENT SURGERY
End: 2024-04-25
Attending: INTERNAL MEDICINE
Payer: COMMERCIAL

## 2024-04-25 VITALS
SYSTOLIC BLOOD PRESSURE: 126 MMHG | BODY MASS INDEX: 37.73 KG/M2 | DIASTOLIC BLOOD PRESSURE: 83 MMHG | HEART RATE: 74 BPM | RESPIRATION RATE: 16 BRPM | WEIGHT: 294 LBS | TEMPERATURE: 97.6 F | HEIGHT: 74 IN | OXYGEN SATURATION: 96 %

## 2024-04-25 DIAGNOSIS — C15.5 MALIGNANT NEOPLASM OF LOWER THIRD OF ESOPHAGUS (HCC): Primary | ICD-10-CM

## 2024-04-25 PROCEDURE — 88305 TISSUE EXAM BY PATHOLOGIST: CPT | Performed by: STUDENT IN AN ORGANIZED HEALTH CARE EDUCATION/TRAINING PROGRAM

## 2024-04-25 PROCEDURE — 43254 EGD ENDO MUCOSAL RESECTION: CPT | Performed by: INTERNAL MEDICINE

## 2024-04-25 RX ORDER — PROPOFOL 10 MG/ML
INJECTION, EMULSION INTRAVENOUS AS NEEDED
Status: DISCONTINUED | OUTPATIENT
Start: 2024-04-25 | End: 2024-04-25

## 2024-04-25 RX ORDER — LIDOCAINE HYDROCHLORIDE 20 MG/ML
INJECTION, SOLUTION EPIDURAL; INFILTRATION; INTRACAUDAL; PERINEURAL AS NEEDED
Status: DISCONTINUED | OUTPATIENT
Start: 2024-04-25 | End: 2024-04-25

## 2024-04-25 RX ORDER — SODIUM CHLORIDE 9 MG/ML
INJECTION, SOLUTION INTRAVENOUS CONTINUOUS PRN
Status: DISCONTINUED | OUTPATIENT
Start: 2024-04-25 | End: 2024-04-25

## 2024-04-25 RX ADMIN — PROPOFOL 50 MG: 10 INJECTION, EMULSION INTRAVENOUS at 07:42

## 2024-04-25 RX ADMIN — LIDOCAINE HYDROCHLORIDE 100 MG: 20 INJECTION, SOLUTION EPIDURAL; INFILTRATION; INTRACAUDAL; PERINEURAL at 07:35

## 2024-04-25 RX ADMIN — PROPOFOL 100 MCG/KG/MIN: 10 INJECTION, EMULSION INTRAVENOUS at 07:36

## 2024-04-25 RX ADMIN — PROPOFOL 100 MG: 10 INJECTION, EMULSION INTRAVENOUS at 07:35

## 2024-04-25 RX ADMIN — PROPOFOL 50 MG: 10 INJECTION, EMULSION INTRAVENOUS at 07:37

## 2024-04-25 RX ADMIN — PROPOFOL 100 MCG/KG/MIN: 10 INJECTION, EMULSION INTRAVENOUS at 07:46

## 2024-04-25 RX ADMIN — SODIUM CHLORIDE: 0.9 INJECTION, SOLUTION INTRAVENOUS at 07:31

## 2024-04-25 NOTE — ANESTHESIA PREPROCEDURE EVALUATION
Procedure:  EGD    Relevant Problems   ANESTHESIA (within normal limits)      CARDIO (within normal limits)   (-) Chest pain   (-) ALEJANDRE (dyspnea on exertion)      ENDO (within normal limits)      GI/HEPATIC  NPO confirmed  BMI 37.4   (+) Adenocarcinoma of esophagus (HCC)   (+) Gastroesophageal reflux disease      /RENAL (within normal limits)      MUSCULOSKELETAL   (+) Chronic right-sided low back pain with right-sided sciatica      NEURO/PSYCH   (+) Chronic right-sided low back pain with right-sided sciatica      PULMONARY (within normal limits)   (-) Sleep apnea   (-) URI (upper respiratory infection)      Allergies   Allergen Reactions    Pollen Extract Other (See Comments)     Itchy burning eyes    Cat Hair Extract      Animal dander-triggers asthma     Social History     Tobacco Use    Smoking status: Never     Passive exposure: Past    Smokeless tobacco: Never   Vaping Use    Vaping status: Never Used   Substance Use Topics    Alcohol use: Not Currently     Alcohol/week: 2.0 standard drinks of alcohol     Types: 2 Standard drinks or equivalent per week     Comment: Social drinker    Drug use: Never     Current Outpatient Medications   Medication Instructions    albuterol (Ventolin HFA) 90 mcg/act inhaler 2 puffs, Inhalation, Every 6 hours PRN    diphenhydrAMINE-acetaminophen (Tylenol PM Extra Strength)  MG TABS     montelukast (SINGULAIR) 10 mg tablet TAKE 1 TABLET DAILY AT BEDTIME    Multiple Vitamins-Minerals (PRESERVISION AREDS 2+MULTI VIT PO) Oral, Every morning, Last dose 6/14/2020     multivitamin (THERAGRAN) TABS 1 tablet, Oral, Daily, [employer] mens multivitamin    olopatadine (PATANOL) 0.1 % ophthalmic solution 1 drop, Both Eyes, 2 times daily    omeprazole (PriLOSEC) 20 mg delayed release capsule TAKE 1 CAPSULE DAILY    psyllium (METAMUCIL) 0.52 g, Oral     Lab Results   Component Value Date    WBC 5.82 11/21/2023    HGB 16.2 11/21/2023    HCT 46.6 11/21/2023     11/21/2023    SODIUM 140  11/21/2023    K 4.4 11/21/2023     11/21/2023    CO2 29 11/21/2023    BUN 17 11/21/2023    CREATININE 0.89 11/21/2023    GLUC 97 07/21/2023    HGBA1C 5.2 07/21/2023    AST 56 (H) 11/21/2023    ALT 67 (H) 11/21/2023    ALKPHOS 55 11/21/2023    TBILI 0.66 11/21/2023    ALB 4.2 11/21/2023    PROTIME 14.2 11/21/2023    INR 1.11 11/21/2023     Vitals:    04/25/24 0716   BP: 136/86   Pulse: 78   Resp: 18   Temp: 97.5 °F (36.4 °C)   SpO2: 97%     Stress test 8/20/21  STRESS SUMMARY: Duration of exercise was 7 min and 36 sec. The patient exercised to protocol stage 3. Maximal work rate was 10.1 METs. Maximal heart rate during stress was 155 bpm ( 92 % of maximal predicted heart rate). The heart rate  response to stress was normal. There was normal resting blood pressure with an appropriate response to stress. The rate-pressure product for the peak heart rate and blood pressure was 47338. There was no chest pain during stress. The  stress test was terminated due to achievement of target heart rate and moderate dyspnea.     pre aso2= 100%  peak sao2= 99% The stress ECG was negative for ischemia. There were no stress arrhythmias or conduction abnormalities.     SUMMARY:  -  Stress results: Duration of exercise was 7 min and 36 sec. Target heart rate was achieved. There was no chest pain during stress.  -  ECG conclusions: The stress ECG was negative for ischemia.     IMPRESSIONS: Normal study after maximal exercise without reproduction of symptoms.    Physical Exam    Airway    Mallampati score: I  TM Distance: >3 FB  Neck ROM: full     Dental   Comment: Denies loose/chipped teeth, No notable dental hx     Cardiovascular  Rhythm: regular, Rate: normal    Pulmonary   Breath sounds clear to auscultation    Other Findings        Anesthesia Plan  ASA Score- 2     Anesthesia Type- IV sedation with anesthesia with ASA Monitors.         Additional Monitors:     Airway Plan:     Comment: O2 mask, natural airway, EtCO2 monitor.  Risks discussed including awareness, aspiration, drug reactions and conversion to GA..       Plan Factors-Exercise tolerance (METS): >4 METS.    Chart reviewed.   Existing labs reviewed. Patient summary reviewed.    Patient is not a current smoker.  Patient did not smoke on day of surgery.    Obstructive sleep apnea risk education given perioperatively.        Induction- intravenous.    Postoperative Plan-     Informed Consent- Anesthetic plan and risks discussed with patient and spouse.  I personally reviewed this patient with the CRNA. Discussed and agreed on the Anesthesia Plan with the CRNA..

## 2024-04-25 NOTE — H&P
History and Physical -  [facility]  Red Bishop 53 y.o. male MRN: [id number]                  HPI: Red Bishop is a 53 y.o. year old male who presents for EGD      REVIEW OF SYSTEMS: Per the HPI, and otherwise unremarkable.    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    Snores     Wears glasses      Past Surgical History:   Procedure Laterality Date    COLONOSCOPY  June 2020    EGD AND COLONOSCOPY  2020    FRACTURE SURGERY      slipped epiphysis left femur 1893 pins    KNEE ARTHROSCOPY Right 1997    meniscectomy    PILONIDAL CYST / SINUS EXCISION      SLIPPED CAPITAL FEMORAL EPIPHYSIS PINNING Left 06/1983    TONSILLECTOMY      UPPER GASTROINTESTINAL ENDOSCOPY  June 2020, Nov 2023    VASECTOMY  2008    WISDOM TOOTH EXTRACTION       Social History   Social History     Substance and Sexual Activity   Alcohol Use Not Currently    Alcohol/week: 2.0 standard drinks of alcohol    Types: 2 Standard drinks or equivalent per week    Comment: Social drinker     Social History     Substance and Sexual Activity   Drug Use Never     Social History     Tobacco Use   Smoking Status Never    Passive exposure: Past   Smokeless Tobacco Never     Family History   Problem Relation Age of Onset    Depression Mother     Hypertension Mother     Diabetes Mother     Arthritis Mother     Breast cancer Mother         1981 2006    Heart disease Father         CABGx3, CAD, pacemaker    Kidney cancer  "Father 57    Bursitis Brother     Diverticulosis Brother     No Known Problems Son     No Known Problems Daughter     Esophageal cancer Paternal Uncle         around age 71 diagnosis.       Meds/Allergies       Current Outpatient Medications:     montelukast (SINGULAIR) 10 mg tablet    Multiple Vitamins-Minerals (PRESERVISION AREDS 2+MULTI VIT PO)    omeprazole (PriLOSEC) 20 mg delayed release capsule    psyllium (METAMUCIL) 0.52 g capsule    albuterol (Ventolin HFA) 90 mcg/act inhaler    diphenhydrAMINE-acetaminophen (Tylenol PM Extra Strength)  MG TABS    multivitamin (THERAGRAN) TABS    olopatadine (PATANOL) 0.1 % ophthalmic solution    Allergies   Allergen Reactions    Pollen Extract Other (See Comments)     Itchy burning eyes    Cat Hair Extract      Animal dander-triggers asthma       Objective     /86   Pulse 78   Temp 97.5 °F (36.4 °C) (Tympanic)   Resp 18   Ht 6' 2\" (1.88 m)   Wt 133 kg (294 lb)   SpO2 97%   BMI 37.75 kg/m²       PHYSICAL EXAM    Gen: NAD  Head: NCAT  CV: RRR  CHEST: Clear  ABD: soft, NT/ND  EXT: no edema      ASSESSMENT/PLAN:  This is a 53 y.o. year old male here for EGD, and he is stable and optimized for his procedure.        "

## 2024-04-25 NOTE — ANESTHESIA POSTPROCEDURE EVALUATION
Post-Op Assessment Note    CV Status:  Stable  Pain Score: 0    Pain management: adequate       Mental Status:  Arousable   Hydration Status:  Euvolemic   PONV Controlled:  Controlled   Airway Patency:  Patent     Post Op Vitals Reviewed: Yes    No anethesia notable event occurred.    Staff: Anesthesiologist, CRNA               /73 (04/25/24 0754)    Temp 97.6 °F (36.4 °C) (04/25/24 0754)    Pulse 75 (04/25/24 0754)   Resp 16 (04/25/24 0754)    SpO2 96 % (04/25/24 0754)

## 2024-04-29 ENCOUNTER — TELEPHONE (OUTPATIENT)
Age: 54
End: 2024-04-29

## 2024-04-29 DIAGNOSIS — H53.9 VISUAL DISTURBANCE: Primary | ICD-10-CM

## 2024-04-29 NOTE — TELEPHONE ENCOUNTER
Pt called states he has an appt tomorrow  @ 9:00 w:      [facility] specialist      [phone]     [phone]     Jessica LEA    Pt states he has previously called to request one and was told the previous one was still good. [facility] called him and said it has  and needs a new one. Pt is requesting a new insurance referral by tomorrow and wants a return call.    Same referral and info from 22

## 2024-04-30 ENCOUNTER — TELEPHONE (OUTPATIENT)
Dept: GASTROENTEROLOGY | Facility: CLINIC | Age: 54
End: 2024-04-30

## 2024-04-30 NOTE — TELEPHONE ENCOUNTER
----- Message from Kamron Farrell MD sent at 4/29/2024  2:34 PM EDT -----  No evidence of cancer cells. Repeat EGD/EMR in 2 months

## 2024-05-01 NOTE — TELEPHONE ENCOUNTER
Scheduled date of EGD with EMR (as of today): 7/1/24  Physician performing EGD: Dr. Farrell  Location of EGD: [place]   Instructions reviewed with patient by: Mima newton   Clearances:  n/a

## 2024-06-30 ENCOUNTER — ANESTHESIA (OUTPATIENT)
Dept: ANESTHESIOLOGY | Facility: HOSPITAL | Age: 54
End: 2024-06-30

## 2024-06-30 ENCOUNTER — ANESTHESIA EVENT (OUTPATIENT)
Dept: ANESTHESIOLOGY | Facility: HOSPITAL | Age: 54
End: 2024-06-30

## 2024-06-30 NOTE — ANESTHESIA PREPROCEDURE EVALUATION
Procedure:  PRE-OP ONLY    Relevant Problems   GI/HEPATIC   (+) Adenocarcinoma of esophagus (HCC)   (+) Gastroesophageal reflux disease      MUSCULOSKELETAL   (+) Chronic right-sided low back pain with right-sided sciatica      NEURO/PSYCH   (+) Chronic right-sided low back pain with right-sided sciatica      EGD with EMR    Exercise Stress 8/2021:  SUMMARY:  -  Stress results: Duration of exercise was 7 min and 36 sec. Target heart rate was achieved. There was no chest pain during stress.  -  ECG conclusions: The stress ECG was negative for ischemia.     IMPRESSIONS: Normal study after maximal exercise without reproduction of symptoms.    Lab Results   Component Value Date    WBC 5.82 11/21/2023    HGB 16.2 11/21/2023    HCT 46.6 11/21/2023    MCV 89 11/21/2023     11/21/2023     Lab Results   Component Value Date    SODIUM 140 11/21/2023    K 4.4 11/21/2023     11/21/2023    CO2 29 11/21/2023    BUN 17 11/21/2023    CREATININE 0.89 11/21/2023    GLUC 97 07/21/2023    CALCIUM 9.9 11/21/2023     Lab Results   Component Value Date    INR 1.11 11/21/2023    PROTIME 14.2 11/21/2023     Lab Results   Component Value Date    HGBA1C 5.2 07/21/2023               Anesthesia Plan  ASA Score- 2     Anesthesia Type- IV sedation with anesthesia with ASA Monitors.         Additional Monitors:     Airway Plan:            Plan Factors-    Chart reviewed. EKG reviewed.  Existing labs reviewed. Patient summary reviewed.                  Induction- intravenous.    Postoperative Plan-         Informed Consent-

## 2024-07-01 ENCOUNTER — ANESTHESIA (OUTPATIENT)
Dept: GASTROENTEROLOGY | Facility: HOSPITAL | Age: 54
End: 2024-07-01

## 2024-07-01 ENCOUNTER — ANESTHESIA EVENT (OUTPATIENT)
Dept: GASTROENTEROLOGY | Facility: HOSPITAL | Age: 54
End: 2024-07-01

## 2024-07-01 ENCOUNTER — HOSPITAL ENCOUNTER (OUTPATIENT)
Dept: GASTROENTEROLOGY | Facility: HOSPITAL | Age: 54
Setting detail: OUTPATIENT SURGERY
Discharge: HOME | End: 2024-07-01
Attending: INTERNAL MEDICINE
Payer: COMMERCIAL

## 2024-07-01 VITALS
WEIGHT: 290 LBS | HEIGHT: 74 IN | HEART RATE: 79 BPM | SYSTOLIC BLOOD PRESSURE: 114 MMHG | TEMPERATURE: 97.9 F | BODY MASS INDEX: 37.22 KG/M2 | RESPIRATION RATE: 16 BRPM | DIASTOLIC BLOOD PRESSURE: 63 MMHG | OXYGEN SATURATION: 95 %

## 2024-07-01 DIAGNOSIS — C15.5 MALIGNANT NEOPLASM OF LOWER THIRD OF ESOPHAGUS (HCC): ICD-10-CM

## 2024-07-01 PROCEDURE — 43254 EGD ENDO MUCOSAL RESECTION: CPT | Performed by: INTERNAL MEDICINE

## 2024-07-01 PROCEDURE — 88313 SPECIAL STAINS GROUP 2: CPT | Performed by: PATHOLOGY

## 2024-07-01 PROCEDURE — 88305 TISSUE EXAM BY PATHOLOGIST: CPT | Performed by: PATHOLOGY

## 2024-07-01 PROCEDURE — 43251 EGD REMOVE LESION SNARE: CPT | Performed by: INTERNAL MEDICINE

## 2024-07-01 RX ORDER — PROPOFOL 10 MG/ML
INJECTION, EMULSION INTRAVENOUS AS NEEDED
Status: DISCONTINUED | OUTPATIENT
Start: 2024-07-01 | End: 2024-07-01

## 2024-07-01 RX ORDER — LIDOCAINE HYDROCHLORIDE 20 MG/ML
INJECTION, SOLUTION EPIDURAL; INFILTRATION; INTRACAUDAL; PERINEURAL AS NEEDED
Status: DISCONTINUED | OUTPATIENT
Start: 2024-07-01 | End: 2024-07-01

## 2024-07-01 RX ORDER — FENTANYL CITRATE/PF 50 MCG/ML
25 SYRINGE (ML) INJECTION
Status: CANCELLED | OUTPATIENT
Start: 2024-07-01

## 2024-07-01 RX ORDER — SODIUM CHLORIDE 9 MG/ML
INJECTION, SOLUTION INTRAVENOUS CONTINUOUS PRN
Status: DISCONTINUED | OUTPATIENT
Start: 2024-07-01 | End: 2024-07-01

## 2024-07-01 RX ORDER — ALBUTEROL SULFATE 2.5 MG/3ML
2.5 SOLUTION RESPIRATORY (INHALATION) ONCE AS NEEDED
Status: CANCELLED | OUTPATIENT
Start: 2024-07-01

## 2024-07-01 RX ORDER — ASPIRIN 81 MG/1
81 TABLET, CHEWABLE ORAL DAILY
COMMUNITY

## 2024-07-01 RX ORDER — ONDANSETRON 2 MG/ML
4 INJECTION INTRAMUSCULAR; INTRAVENOUS ONCE AS NEEDED
Status: CANCELLED | OUTPATIENT
Start: 2024-07-01

## 2024-07-01 RX ADMIN — PROPOFOL 30 MG: 10 INJECTION, EMULSION INTRAVENOUS at 14:52

## 2024-07-01 RX ADMIN — PROPOFOL 30 MG: 10 INJECTION, EMULSION INTRAVENOUS at 14:55

## 2024-07-01 RX ADMIN — LIDOCAINE HYDROCHLORIDE 40 MG: 20 INJECTION, SOLUTION EPIDURAL; INFILTRATION; INTRACAUDAL; PERINEURAL at 14:47

## 2024-07-01 RX ADMIN — PROPOFOL 150 MG: 10 INJECTION, EMULSION INTRAVENOUS at 14:43

## 2024-07-01 RX ADMIN — SODIUM CHLORIDE: 0.9 INJECTION, SOLUTION INTRAVENOUS at 14:27

## 2024-07-01 RX ADMIN — SODIUM CHLORIDE: 0.9 INJECTION, SOLUTION INTRAVENOUS at 14:37

## 2024-07-01 RX ADMIN — PROPOFOL 120 MCG/KG/MIN: 10 INJECTION, EMULSION INTRAVENOUS at 14:44

## 2024-07-01 RX ADMIN — PROPOFOL 50 MG: 10 INJECTION, EMULSION INTRAVENOUS at 14:47

## 2024-07-01 RX ADMIN — LIDOCAINE HYDROCHLORIDE 100 MG: 20 INJECTION, SOLUTION EPIDURAL; INFILTRATION; INTRACAUDAL; PERINEURAL at 14:43

## 2024-07-01 NOTE — ANESTHESIA PREPROCEDURE EVALUATION
Procedure:  EGD    Relevant Problems   ANESTHESIA (within normal limits)      CARDIO (within normal limits)      ENDO (within normal limits)      GI/HEPATIC   (+) Adenocarcinoma of esophagus (HCC)   (+) Gastroesophageal reflux disease      /RENAL (within normal limits)      HEMATOLOGY (within normal limits)      MUSCULOSKELETAL   (+) Chronic right-sided low back pain with right-sided sciatica      NEURO/PSYCH   (+) Chronic right-sided low back pain with right-sided sciatica      PULMONARY (within normal limits)      FEN/Gastrointestinal   (+) Moe's esophagus without dysplasia      Other   (+) Dyslipidemia      EGD with EMR    Exercise Stress 8/2021:  SUMMARY:  -  Stress results: Duration of exercise was 7 min and 36 sec. Target heart rate was achieved. There was no chest pain during stress.  -  ECG conclusions: The stress ECG was negative for ischemia.     IMPRESSIONS: Normal study after maximal exercise without reproduction of symptoms.    Lab Results   Component Value Date    WBC 5.82 11/21/2023    HGB 16.2 11/21/2023    HCT 46.6 11/21/2023    MCV 89 11/21/2023     11/21/2023     Lab Results   Component Value Date    SODIUM 140 11/21/2023    K 4.4 11/21/2023     11/21/2023    CO2 29 11/21/2023    BUN 17 11/21/2023    CREATININE 0.89 11/21/2023    GLUC 97 07/21/2023    CALCIUM 9.9 11/21/2023     Lab Results   Component Value Date    INR 1.11 11/21/2023    PROTIME 14.2 11/21/2023     Lab Results   Component Value Date    HGBA1C 5.2 07/21/2023          Physical Exam    Airway    Mallampati score: II  TM Distance: >3 FB  Neck ROM: full     Dental   No notable dental hx     Cardiovascular  Cardiovascular exam normal    Pulmonary  Pulmonary exam normal     Other Findings        Anesthesia Plan  ASA Score- 2     Anesthesia Type- IV sedation with anesthesia with ASA Monitors.         Additional Monitors:     Airway Plan:            Plan Factors-Exercise tolerance (METS): >4 METS.    Chart reviewed. EKG  reviewed.  Existing labs reviewed. Patient summary reviewed.                  Induction- intravenous.    Postoperative Plan-         Informed Consent- Anesthetic plan and risks discussed with patient.  I personally reviewed this patient with the CRNA. Discussed and agreed on the Anesthesia Plan with the CRNA..

## 2024-07-01 NOTE — ANESTHESIA POSTPROCEDURE EVALUATION
Post-Op Assessment Note    CV Status:  Stable  Pain Score: 0    Pain management: adequate       Mental Status:  Alert and awake   Hydration Status:  Euvolemic   PONV Controlled:  Controlled   Airway Patency:  Patent     Post Op Vitals Reviewed: Yes    No anethesia notable event occurred.    Staff: CRNA           /74 (07/01/24 1505)    Temp 97.9 °F (36.6 °C) (07/01/24 1505)    Pulse 76 (07/01/24 1505)   Resp 18 (07/01/24 1505)    SpO2 96 % (07/01/24 1505)

## 2024-07-05 NOTE — RESULT ENCOUNTER NOTE
No concerning cells noted on EMR specimen just inflammatory cells. Repeat EGD as originally recommended.

## 2024-07-10 ENCOUNTER — PREP FOR PROCEDURE (OUTPATIENT)
Dept: GASTROENTEROLOGY | Facility: CLINIC | Age: 54
End: 2024-07-10

## 2024-07-10 ENCOUNTER — TELEPHONE (OUTPATIENT)
Dept: GASTROENTEROLOGY | Facility: CLINIC | Age: 54
End: 2024-07-10

## 2024-07-10 DIAGNOSIS — C15.5 MALIGNANT NEOPLASM OF LOWER THIRD OF ESOPHAGUS (HCC): Primary | ICD-10-CM

## 2024-07-10 NOTE — TELEPHONE ENCOUNTER
----- Message from Mima PALMA sent at 7/8/2024  7:45 AM EDT -----    ----- Message -----  From: Odalys Hernandez PA-C  Sent: 7/5/2024   1:19 PM EDT  To: [facility]    Ready to schedule TY!  ----- Message -----  From: Sylvia Luna  Sent: 7/5/2024   8:07 AM EDT  To: [facility]    Please review Repeat EGD with RFA/EMR in 3 months, Thank you  ----- Message -----  From: Kamron Farrell MD  Sent: 7/5/2024   7:35 AM EDT  To: #    No concerning cells noted on EMR specimen just inflammatory cells. Repeat EGD as originally recommended.

## 2024-07-18 ENCOUNTER — TELEPHONE (OUTPATIENT)
Age: 54
End: 2024-07-18

## 2024-07-18 NOTE — TELEPHONE ENCOUNTER
Patients GI provider:  Dr. Farrell    Number to return call: [phone]     Reason for call: Pt returning phone call to Mima to schedule EGD/EMR. Please return patients call.

## 2024-07-19 NOTE — TELEPHONE ENCOUNTER
Scheduled date of EGD Emr (as of today):9/20/24  Physician performing EGD: Dr. Farrell   Location of EGD: [place]   Instructions reviewed with patient by: Mima newton   Clearances:  n/a

## 2024-08-05 ENCOUNTER — RA CDI HCC (OUTPATIENT)
Dept: OTHER | Facility: HOSPITAL | Age: 54
End: 2024-08-05

## 2024-08-09 ENCOUNTER — OFFICE VISIT (OUTPATIENT)
Dept: FAMILY MEDICINE CLINIC | Facility: CLINIC | Age: 54
End: 2024-08-09
Payer: COMMERCIAL

## 2024-08-09 VITALS
BODY MASS INDEX: 37.6 KG/M2 | HEIGHT: 74 IN | DIASTOLIC BLOOD PRESSURE: 70 MMHG | TEMPERATURE: 98.1 F | OXYGEN SATURATION: 97 % | HEART RATE: 76 BPM | RESPIRATION RATE: 14 BRPM | WEIGHT: 293 LBS | SYSTOLIC BLOOD PRESSURE: 110 MMHG

## 2024-08-09 DIAGNOSIS — M79.671 RIGHT FOOT PAIN: ICD-10-CM

## 2024-08-09 DIAGNOSIS — G89.29 CHRONIC RIGHT-SIDED LOW BACK PAIN WITH RIGHT-SIDED SCIATICA: ICD-10-CM

## 2024-08-09 DIAGNOSIS — K21.9 GASTROESOPHAGEAL REFLUX DISEASE WITHOUT ESOPHAGITIS: ICD-10-CM

## 2024-08-09 DIAGNOSIS — Z13.0 SCREENING, IRON DEFICIENCY ANEMIA: ICD-10-CM

## 2024-08-09 DIAGNOSIS — Z12.5 PROSTATE CANCER SCREENING: ICD-10-CM

## 2024-08-09 DIAGNOSIS — Z13.1 DIABETES MELLITUS SCREENING: ICD-10-CM

## 2024-08-09 DIAGNOSIS — E66.01 MORBID OBESITY (HCC): ICD-10-CM

## 2024-08-09 DIAGNOSIS — E87.8 ELECTROLYTE ABNORMALITY: ICD-10-CM

## 2024-08-09 DIAGNOSIS — Z00.00 ANNUAL PHYSICAL EXAM: Primary | ICD-10-CM

## 2024-08-09 DIAGNOSIS — M54.41 CHRONIC RIGHT-SIDED LOW BACK PAIN WITH RIGHT-SIDED SCIATICA: ICD-10-CM

## 2024-08-09 DIAGNOSIS — Z13.29 THYROID DISORDER SCREENING: ICD-10-CM

## 2024-08-09 DIAGNOSIS — H31.103: ICD-10-CM

## 2024-08-09 DIAGNOSIS — E55.9 VITAMIN D DEFICIENCY: ICD-10-CM

## 2024-08-09 DIAGNOSIS — K22.70 BARRETT'S ESOPHAGUS WITHOUT DYSPLASIA: ICD-10-CM

## 2024-08-09 DIAGNOSIS — C15.9 ADENOCARCINOMA OF ESOPHAGUS (HCC): ICD-10-CM

## 2024-08-09 DIAGNOSIS — J45.20 MILD INTERMITTENT ASTHMA WITHOUT COMPLICATION: ICD-10-CM

## 2024-08-09 DIAGNOSIS — E78.5 DYSLIPIDEMIA: ICD-10-CM

## 2024-08-09 PROBLEM — R74.8 ELEVATED LIVER ENZYMES: Status: RESOLVED | Noted: 2020-03-12 | Resolved: 2024-08-09

## 2024-08-09 PROCEDURE — 99396 PREV VISIT EST AGE 40-64: CPT | Performed by: STUDENT IN AN ORGANIZED HEALTH CARE EDUCATION/TRAINING PROGRAM

## 2024-08-09 PROCEDURE — 99214 OFFICE O/P EST MOD 30 MIN: CPT | Performed by: STUDENT IN AN ORGANIZED HEALTH CARE EDUCATION/TRAINING PROGRAM

## 2024-08-09 RX ORDER — ALBUTEROL SULFATE 90 UG/1
2 AEROSOL, METERED RESPIRATORY (INHALATION) EVERY 6 HOURS PRN
Qty: 18 G | Refills: 1 | Status: SHIPPED | OUTPATIENT
Start: 2024-08-09

## 2024-08-09 NOTE — PATIENT INSTRUCTIONS
"Patient Education     Routine physical for adults   The Basics   Written by the doctors and editors at [facility]   What is a physical? -- A physical is a routine visit, or \"check-up,\" with your doctor. You might also hear it called a \"wellness visit\" or \"preventive visit.\"  During each visit, the doctor will:   Ask about your physical and mental health   Ask about your habits, behaviors, and lifestyle   Do an exam   Give you vaccines if needed   Talk to you about any medicines you take   Give advice about your health   Answer your questions  Getting regular check-ups is an important part of taking care of your health. It can help your doctor find and treat any problems you have. But it's also important for preventing health problems.  A routine physical is different from a \"sick visit.\" A sick visit is when you see a doctor because of a health concern or problem. Since physicals are scheduled ahead of time, you can think about what you want to ask the doctor.  How often should I get a physical? -- It depends on your age and health. In general, for people age 21 years and older:   If you are younger than 50 years, you might be able to get a physical every 3 years.   If you are 50 years or older, your doctor might recommend a physical every year.  If you have an ongoing health condition, like diabetes or high blood pressure, your doctor will probably want to see you more often.  What happens during a physical? -- In general, each visit will include:   Physical exam - The doctor or nurse will check your height, weight, heart rate, and blood pressure. They will also look at your eyes and ears. They will ask about how you are feeling and whether you have any symptoms that bother you.   Medicines - It's a good idea to bring a list of all the medicines you take to each doctor visit. Your doctor will talk to you about your medicines and answer any questions. Tell them if you are having any side effects that bother you. You " "should also tell them if you are having trouble paying for any of your medicines.   Habits and behaviors - This includes:   Your diet   Your exercise habits   Whether you smoke, drink alcohol, or use drugs   Whether you are sexually active   Whether you feel safe at home  Your doctor will talk to you about things you can do to improve your health and lower your risk of health problems. They will also offer help and support. For example, if you want to quit smoking, they can give you advice and might prescribe medicines. If you want to improve your diet or get more physical activity, they can help you with this, too.   Lab tests, if needed - The tests you get will depend on your age and situation. For example, your doctor might want to check your:   Cholesterol   Blood sugar   Iron level   Vaccines - The recommended vaccines will depend on your age, health, and what vaccines you already had. Vaccines are very important because they can prevent certain serious or deadly infections.   Discussion of screening - \"Screening\" means checking for diseases or other health problems before they cause symptoms. Your doctor can recommend screening based on your age, risk, and preferences. This might include tests to check for:   Cancer, such as breast, prostate, cervical, ovarian, colorectal, prostate, lung, or skin cancer   Sexually transmitted infections, such as chlamydia and gonorrhea   Mental health conditions like depression and anxiety  Your doctor will talk to you about the different types of screening tests. They can help you decide which screenings to have. They can also explain what the results might mean.   Answering questions - The physical is a good time to ask the doctor or nurse questions about your health. If needed, they can refer you to other doctors or specialists, too.  Adults older than 65 years often need other care, too. As you get older, your doctor will talk to you about:   How to prevent falling at " home   Hearing or vision tests   Memory testing   How to take your medicines safely   Making sure that you have the help and support you need at home  All topics are updated as new evidence becomes available and our peer review process is complete.  This topic retrieved from [employer] on: May 02, 2024.  Topic 028199 Version 1.0  Release: 32.4.3 - C32.122  © 2024 UpToDate, Inc. and/or its affiliates. All rights reserved.  Consumer Information Use and Disclaimer   Disclaimer: This generalized information is a limited summary of diagnosis, treatment, and/or medication information. It is not meant to be comprehensive and should be used as a tool to help the user understand and/or assess potential diagnostic and treatment options. It does NOT include all information about conditions, treatments, medications, side effects, or risks that may apply to a specific patient. It is not intended to be medical advice or a substitute for the medical advice, diagnosis, or treatment of a health care provider based on the health care provider's examination and assessment of a patient's specific and unique circumstances. Patients must speak with a health care provider for complete information about their health, medical questions, and treatment options, including any risks or benefits regarding use of medications. This information does not endorse any treatments or medications as safe, effective, or approved for treating a specific patient. UpToDate, Inc. and its affiliates disclaim any warranty or liability relating to this information or the use thereof.The use of this information is governed by the Terms of Use, available at https://www.woltersDataslideuwer.com/en/know/clinical-effectiveness-terms. 2024© UpToDate, Inc. and its affiliates and/or licensors. All rights reserved.  Copyright   © 2024 UpToDate, Inc. and/or its affiliates. All rights reserved.

## 2024-08-09 NOTE — PROGRESS NOTES
Adult Annual Physical  Name: Red Bishop      : 1970      MRN: [id number]  Encounter Provider: Jayjay Mirza DO  Encounter Date: 2024   Encounter department: [facility]    Assessment & Plan   1. Annual physical exam  2. Adenocarcinoma of esophagus (HCC)  3. Moe's esophagus without dysplasia  4. Gastroesophageal reflux disease without esophagitis  5. 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 Dyslipidemia  -     Lipid panel; Future  -     Lipid panel  7. Morbid obesity (HCC)  8. Chronic right-sided low back pain with right-sided sciatica  9. Thyroid disorder screening  -     TSH, 3rd generation with Free T4 reflex; Future  -     TSH, 3rd generation with Free T4 reflex  10. Diabetes mellitus screening  -     Hemoglobin A1C With EAG; Future  -     Hemoglobin A1C With EAG  11. Screening, iron deficiency anemia  -     CBC and differential; Future  -     CBC and differential  12. Prostate cancer screening  -     PSA, Total Screen; Future  -     PSA, Total Screen  13. Electrolyte abnormality  -     Comprehensive metabolic panel; Future  -     Comprehensive metabolic panel  14. Vitamin D deficiency  -     Vitamin D 25 hydroxy; Future  -     Vitamin D 25 hydroxy  15. Right foot pain  -     Ambulatory Referral to Podiatry; Future  16. Mild intermittent asthma without complication  -     albuterol (Ventolin HFA) 90 mcg/act inhaler; Inhale 2 puffs every 6 (six) hours as needed for wheezing    Patient is a pleasant 53-year-old male coming in for annual physical, recommend yearly blood work, albuterol inhaler to be used as needed refilled.  Patient is following closely with gastroenterology specialist for adenocarcinoma of esophagus, most recent biopsies have come back negative, patient is following Dr. Farrell.  Overall asymptomatic, patient also notes bilateral foot pain lateral regions, patient will follow-up with podiatry.    Follow-up blood work, continue to follow-up  with specialist, telephone call to discuss blood work.    Immunizations and preventive care screenings were discussed with patient today. Appropriate education was printed on patient's after visit summary.    Discussed risks and benefits of prostate cancer screening. We discussed the controversial history of PSA screening for prostate cancer in the [place] as well as the risk of over detection and over treatment of prostate cancer by way of PSA screening.  The patient understands that PSA blood testing is an imperfect way to screen for prostate cancer and that elevated PSA levels in the blood may also be caused by infection, inflammation, prostatic trauma or manipulation, urological procedures, or by benign prostatic enlargement.    The role of the digital rectal examination in prostate cancer screening was also discussed and I discussed with him that there is large interobserver variability in the findings of digital rectal examination.    Counseling:  Alcohol/drug use: discussed moderation in alcohol intake, the recommendations for healthy alcohol use, and avoidance of illicit drug use.  Dental Health: discussed importance of regular tooth brushing, flossing, and dental visits.  Injury prevention: discussed safety/seat belts, safety helmets, smoke detectors, carbon dioxide detectors, and smoking near bedding or upholstery.  Sexual health: discussed sexually transmitted diseases, partner selection, use of condoms, avoidance of unintended pregnancy, and contraceptive alternatives.  Exercise: the importance of regular exercise/physical activity was discussed. Recommend exercise 3-5 times per week for at least 30 minutes.       Depression Screening and Follow-up Plan: Patient was screened for depression during today's encounter. They screened negative with a PHQ-2 score of 0.        History of Present Illness     Adult Annual Physical:  Patient presents for annual physical.     Diet and Physical Activity:  -  "Diet/Nutrition: well balanced diet.  - Exercise: 3-4 times a week on average.    Depression Screening:  - PHQ-2 Score: 0    General Health:  - Sleep: sleeps well.  - Hearing: normal hearing bilateral ears.  - Vision: no vision problems.  - Dental: regular dental visits.     Health:  - History of STDs: no.   - Urinary symptoms: none.     Review of Systems   Constitutional:  Negative for chills and fever.   HENT:  Negative for ear pain and sore throat.    Eyes:  Negative for pain and visual disturbance.   Respiratory:  Negative for cough and shortness of breath.    Cardiovascular:  Negative for chest pain and palpitations.   Gastrointestinal:  Negative for abdominal pain and vomiting.   Genitourinary:  Negative for dysuria and hematuria.   Musculoskeletal:  Negative for arthralgias and back pain.   Skin:  Negative for color change and rash.   Neurological:  Negative for seizures and syncope.   All other systems reviewed and are negative.        Objective     /70 (BP Location: Left arm, Patient Position: Sitting, Cuff Size: Adult)   Pulse 76   Temp 98.1 °F (36.7 °C) (Temporal)   Resp 14   Ht 6' 2\" (1.88 m)   Wt 133 kg (293 lb)   SpO2 97%   BMI 37.62 kg/m²     Physical Exam  Vitals and nursing note reviewed.   Constitutional:       General: He is not in acute distress.     Appearance: He is well-developed.   HENT:      Head: Normocephalic and atraumatic.   Eyes:      General: No scleral icterus.     Conjunctiva/sclera: Conjunctivae normal.   Cardiovascular:      Rate and Rhythm: Normal rate and regular rhythm.      Pulses: Normal pulses.      Heart sounds: No murmur heard.  Pulmonary:      Effort: Pulmonary effort is normal. No respiratory distress.      Breath sounds: Normal breath sounds.   Abdominal:      General: Bowel sounds are normal. There is no distension.      Palpations: Abdomen is soft.      Tenderness: There is no abdominal tenderness.   Musculoskeletal:         General: No swelling. Normal " range of motion.      Cervical back: Neck supple.   Skin:     General: Skin is warm and dry.      Capillary Refill: Capillary refill takes less than 2 seconds.   Neurological:      General: No focal deficit present.      Mental Status: He is alert and oriented to person, place, and time. Mental status is at baseline.   Psychiatric:         Mood and Affect: Mood normal.         Behavior: Behavior normal.

## 2024-09-12 DIAGNOSIS — K21.9 GASTROESOPHAGEAL REFLUX DISEASE, UNSPECIFIED WHETHER ESOPHAGITIS PRESENT: ICD-10-CM

## 2024-09-13 ENCOUNTER — APPOINTMENT (OUTPATIENT)
Dept: LAB | Facility: CLINIC | Age: 54
End: 2024-09-13
Payer: COMMERCIAL

## 2024-09-16 ENCOUNTER — TELEPHONE (OUTPATIENT)
Dept: FAMILY MEDICINE CLINIC | Facility: CLINIC | Age: 54
End: 2024-09-16

## 2024-09-17 NOTE — TELEPHONE ENCOUNTER
Patient called back and was advised regarding lab results. He will call for appointment after he sees gastro.

## 2024-09-18 ENCOUNTER — TELEPHONE (OUTPATIENT)
Dept: FAMILY MEDICINE CLINIC | Facility: CLINIC | Age: 54
End: 2024-09-18

## 2024-09-18 NOTE — TELEPHONE ENCOUNTER
----- Message from Jayjay Mirza DO sent at 9/16/2024  7:14 AM EDT -----  Patient has appropriate follow-up with gastroenterology, blood work stable, return to office after gastroenterology evaluation.

## 2024-09-20 ENCOUNTER — ANESTHESIA (OUTPATIENT)
Dept: GASTROENTEROLOGY | Facility: HOSPITAL | Age: 54
End: 2024-09-20
Payer: COMMERCIAL

## 2024-09-20 ENCOUNTER — HOSPITAL ENCOUNTER (OUTPATIENT)
Dept: GASTROENTEROLOGY | Facility: HOSPITAL | Age: 54
Setting detail: OUTPATIENT SURGERY
End: 2024-09-20
Attending: INTERNAL MEDICINE
Payer: COMMERCIAL

## 2024-09-20 ENCOUNTER — ANESTHESIA EVENT (OUTPATIENT)
Dept: GASTROENTEROLOGY | Facility: HOSPITAL | Age: 54
End: 2024-09-20
Payer: COMMERCIAL

## 2024-09-20 VITALS
OXYGEN SATURATION: 94 % | BODY MASS INDEX: 37.86 KG/M2 | HEIGHT: 74 IN | RESPIRATION RATE: 20 BRPM | TEMPERATURE: 97.1 F | DIASTOLIC BLOOD PRESSURE: 73 MMHG | SYSTOLIC BLOOD PRESSURE: 118 MMHG | HEART RATE: 75 BPM | WEIGHT: 295 LBS

## 2024-09-20 DIAGNOSIS — C15.5 MALIGNANT NEOPLASM OF LOWER THIRD OF ESOPHAGUS (HCC): ICD-10-CM

## 2024-09-20 PROCEDURE — 43239 EGD BIOPSY SINGLE/MULTIPLE: CPT | Performed by: INTERNAL MEDICINE

## 2024-09-20 PROCEDURE — C1885 CATH, TRANSLUMIN ANGIO LASER: HCPCS

## 2024-09-20 PROCEDURE — 88305 TISSUE EXAM BY PATHOLOGIST: CPT | Performed by: STUDENT IN AN ORGANIZED HEALTH CARE EDUCATION/TRAINING PROGRAM

## 2024-09-20 PROCEDURE — 43270 EGD LESION ABLATION: CPT | Performed by: INTERNAL MEDICINE

## 2024-09-20 RX ORDER — LIDOCAINE HYDROCHLORIDE 20 MG/ML
INJECTION, SOLUTION EPIDURAL; INFILTRATION; INTRACAUDAL; PERINEURAL AS NEEDED
Status: DISCONTINUED | OUTPATIENT
Start: 2024-09-20 | End: 2024-09-20

## 2024-09-20 RX ORDER — ACETYLCYSTEINE 200 MG/ML
SOLUTION ORAL; RESPIRATORY (INHALATION) AS NEEDED
Status: COMPLETED | OUTPATIENT
Start: 2024-09-20 | End: 2024-09-20

## 2024-09-20 RX ORDER — FENTANYL CITRATE 50 UG/ML
INJECTION, SOLUTION INTRAMUSCULAR; INTRAVENOUS AS NEEDED
Status: DISCONTINUED | OUTPATIENT
Start: 2024-09-20 | End: 2024-09-20

## 2024-09-20 RX ORDER — PROPOFOL 10 MG/ML
INJECTION, EMULSION INTRAVENOUS AS NEEDED
Status: DISCONTINUED | OUTPATIENT
Start: 2024-09-20 | End: 2024-09-20

## 2024-09-20 RX ORDER — SODIUM CHLORIDE, SODIUM LACTATE, POTASSIUM CHLORIDE, CALCIUM CHLORIDE 600; 310; 30; 20 MG/100ML; MG/100ML; MG/100ML; MG/100ML
INJECTION, SOLUTION INTRAVENOUS CONTINUOUS PRN
Status: DISCONTINUED | OUTPATIENT
Start: 2024-09-20 | End: 2024-09-20

## 2024-09-20 RX ADMIN — FENTANYL CITRATE 25 MCG: 50 INJECTION INTRAMUSCULAR; INTRAVENOUS at 12:25

## 2024-09-20 RX ADMIN — ACETYLCYSTEINE 600 MG: 200 SOLUTION ORAL; RESPIRATORY (INHALATION) at 12:30

## 2024-09-20 RX ADMIN — PROPOFOL 150 MCG/KG/MIN: 10 INJECTION, EMULSION INTRAVENOUS at 12:25

## 2024-09-20 RX ADMIN — FENTANYL CITRATE 25 MCG: 50 INJECTION INTRAMUSCULAR; INTRAVENOUS at 12:33

## 2024-09-20 RX ADMIN — SODIUM CHLORIDE, SODIUM LACTATE, POTASSIUM CHLORIDE, AND CALCIUM CHLORIDE: .6; .31; .03; .02 INJECTION, SOLUTION INTRAVENOUS at 12:09

## 2024-09-20 RX ADMIN — PROPOFOL 150 MG: 10 INJECTION, EMULSION INTRAVENOUS at 12:24

## 2024-09-20 RX ADMIN — LIDOCAINE HYDROCHLORIDE 100 MG: 20 INJECTION, SOLUTION EPIDURAL; INFILTRATION; INTRACAUDAL at 12:25

## 2024-09-20 NOTE — H&P
History and Physical -  [facility]  Red Bishop 54 y.o. male MRN: [id number]                  HPI: Red Bishop is a 54 y.o. year old male who presents for EGD      REVIEW OF SYSTEMS: Per the HPI, and otherwise unremarkable.    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    Snores     Wears glasses      Past Surgical History:   Procedure Laterality Date    COLONOSCOPY  June 2020    EGD AND COLONOSCOPY  2020    FRACTURE SURGERY      slipped epiphysis left femur 1893 pins    KNEE ARTHROSCOPY Right 1997    meniscectomy    PILONIDAL CYST / SINUS EXCISION      SLIPPED CAPITAL FEMORAL EPIPHYSIS PINNING Left 06/1983    TONSILLECTOMY      UPPER GASTROINTESTINAL ENDOSCOPY  June 2020, Nov 2023    VASECTOMY  2008    WISDOM TOOTH EXTRACTION       Social History   Social History     Substance and Sexual Activity   Alcohol Use Not Currently    Alcohol/week: 2.0 standard drinks of alcohol    Types: 2 Standard drinks or equivalent per week    Comment: 2-3 per week     Social History     Substance and Sexual Activity   Drug Use Never     Social History     Tobacco Use   Smoking Status Never    Passive exposure: Past   Smokeless Tobacco Never     Family History   Problem Relation Age of Onset    Depression Mother     Hypertension Mother     Diabetes Mother     Arthritis Mother     Breast cancer Mother         1981 2006    Heart disease Father         CABGx3, CAD, pacemaker    Kidney cancer Father  "57    Bursitis Brother     Diverticulosis Brother     No Known Problems Son     No Known Problems Daughter     Esophageal cancer Paternal Uncle         around age 71 diagnosis.       Meds/Allergies       Current Outpatient Medications:     aspirin 81 mg chewable tablet    montelukast (SINGULAIR) 10 mg tablet    Multiple Vitamins-Minerals (PRESERVISION AREDS 2+MULTI VIT PO)    multivitamin (THERAGRAN) TABS    omeprazole (PriLOSEC) 20 mg delayed release capsule    psyllium (METAMUCIL) 0.52 g capsule    albuterol (Ventolin HFA) 90 mcg/act inhaler    olopatadine (PATANOL) 0.1 % ophthalmic solution    Allergies   Allergen Reactions    Pollen Extract Other (See Comments)     Itchy burning eyes    Cat Hair Extract      Animal dander-triggers asthma       Objective     /80   Pulse 81   Temp 98.8 °F (37.1 °C) (Tympanic)   Resp 18   Ht 6' 2\" (1.88 m)   Wt 134 kg (295 lb)   SpO2 96%   BMI 37.88 kg/m²       PHYSICAL EXAM    Gen: NAD  Head: NCAT  CV: RRR  CHEST: Clear  ABD: soft, NT/ND  EXT: no edema      ASSESSMENT/PLAN:  This is a 54 y.o. year old male here for EGD, and he is stable and optimized for his procedure.        "

## 2024-09-20 NOTE — ANESTHESIA PREPROCEDURE EVALUATION
Procedure:  EGD    Relevant Problems   GI/HEPATIC   (+) Adenocarcinoma of esophagus (HCC)   (+) Gastroesophageal reflux disease      MUSCULOSKELETAL   (+) Chronic right-sided low back pain with right-sided sciatica      NEURO/PSYCH   (+) Chronic right-sided low back pain with right-sided sciatica        Physical Exam    Airway    Mallampati score: III  TM Distance: >3 FB  Neck ROM: full     Dental   No notable dental hx     Cardiovascular  Cardiovascular exam normal    Pulmonary  Pulmonary exam normal     Other Findings        Anesthesia Plan  ASA Score- 2     Anesthesia Type- IV sedation with anesthesia with ASA Monitors.         Additional Monitors:     Airway Plan:            Plan Factors-Exercise tolerance (METS): >4 METS.    Chart reviewed.    Patient summary reviewed.    Patient is not a current smoker.              Induction- intravenous.    Postoperative Plan-         Informed Consent- Anesthetic plan and risks discussed with patient.

## 2024-09-20 NOTE — ANESTHESIA POSTPROCEDURE EVALUATION
Post-Op Assessment Note    CV Status:  Stable  Pain Score: 0    Pain management: adequate       Mental Status:  Awake and sleepy   Hydration Status:  Euvolemic   PONV Controlled:  Controlled   Airway Patency:  Patent     Post Op Vitals Reviewed: Yes    No anethesia notable event occurred.    Staff: Anesthesiologist, CRNA               /73 (09/20/24 1240)    Temp (!) 97.1 °F (36.2 °C) (09/20/24 1240)    Pulse 76 (09/20/24 1240)   Resp 18 (09/20/24 1240)    SpO2 93 % (09/20/24 1240)

## 2024-09-23 ENCOUNTER — TELEPHONE (OUTPATIENT)
Dept: GASTROENTEROLOGY | Facility: CLINIC | Age: 54
End: 2024-09-23

## 2024-09-23 NOTE — TELEPHONE ENCOUNTER
Lm to call back to schedule 3m repeat EGD EMR with Dr Farrell        RECOMMENDATION:  Repeat EGD with RFA in 3 months. Orders placed.  Continue PPI twice daily        Kamron Farrell MD

## 2024-09-24 ENCOUNTER — OFFICE VISIT (OUTPATIENT)
Age: 54
End: 2024-09-24
Payer: COMMERCIAL

## 2024-09-24 VITALS
WEIGHT: 295 LBS | RESPIRATION RATE: 20 BRPM | DIASTOLIC BLOOD PRESSURE: 81 MMHG | HEART RATE: 76 BPM | HEIGHT: 74 IN | BODY MASS INDEX: 37.86 KG/M2 | SYSTOLIC BLOOD PRESSURE: 130 MMHG

## 2024-09-24 DIAGNOSIS — M79.671 RIGHT FOOT PAIN: ICD-10-CM

## 2024-09-24 DIAGNOSIS — M79.672 BILATERAL FOOT PAIN: ICD-10-CM

## 2024-09-24 DIAGNOSIS — M19.079 INFLAMMATION OF METATARSOPHALANGEAL JOINT: ICD-10-CM

## 2024-09-24 DIAGNOSIS — B07.0 PLANTAR WARTS: ICD-10-CM

## 2024-09-24 DIAGNOSIS — M21.621 TAILOR'S BUNION OF RIGHT FOOT: ICD-10-CM

## 2024-09-24 DIAGNOSIS — M77.41 METATARSALGIA OF BOTH FEET: Primary | ICD-10-CM

## 2024-09-24 DIAGNOSIS — M77.42 METATARSALGIA OF BOTH FEET: Primary | ICD-10-CM

## 2024-09-24 DIAGNOSIS — M79.671 BILATERAL FOOT PAIN: ICD-10-CM

## 2024-09-24 PROCEDURE — 99214 OFFICE O/P EST MOD 30 MIN: CPT | Performed by: PODIATRIST

## 2024-09-24 NOTE — PROGRESS NOTES
Assessment/Plan: Metatarsalgia.  Tailor's bunion right greater than left.  Acquired deformity of foot.  Metatarsal abnormality.  Pain.  Rule out plantar warts.    Plan.  Chart reviewed.  PCP notes reviewed.  Patient examined.  Today we will treat atypical verruca/seed corns with Cantharone application.  Aftercare instruction given.  Patient advised on tailor's bunion.  He will continue use proper size shoe style recommendations given.  Watch for signs of infection.  Return for follow-up.         Diagnoses and all orders for this visit:    Metatarsalgia of both feet    Right foot pain  -     Ambulatory Referral to Podiatry    Bilateral foot pain    Inflammation of metatarsophalangeal joint    Plantar warts          Subjective: Patient has return of pain in his feet right greater than left.  No history of trauma.    Allergies   Allergen Reactions    Pollen Extract Other (See Comments)     Itchy burning eyes    Cat Hair Extract      Animal dander-triggers asthma         Current Outpatient Medications:     albuterol (Ventolin HFA) 90 mcg/act inhaler, Inhale 2 puffs every 6 (six) hours as needed for wheezing, Disp: 18 g, Rfl: 1    aspirin 81 mg chewable tablet, Chew 81 mg daily, Disp: , Rfl:     montelukast (SINGULAIR) 10 mg tablet, TAKE 1 TABLET DAILY AT BEDTIME, Disp: 90 tablet, Rfl: 1    Multiple Vitamins-Minerals (PRESERVISION AREDS 2+MULTI VIT PO), Take by mouth every morning Last dose 6/14/2020, Disp: , Rfl:     multivitamin (THERAGRAN) TABS, Take 1 tablet by mouth daily [employer] mens multivitamin, Disp: , Rfl:     olopatadine (PATANOL) 0.1 % ophthalmic solution, Administer 1 drop to both eyes 2 (two) times a day (Patient taking differently: Administer 1 drop to both eyes as needed), Disp: 5 mL, Rfl: 2    omeprazole (PriLOSEC) 20 mg delayed release capsule, TAKE 1 CAPSULE DAILY, Disp: 90 capsule, Rfl: 1    psyllium (METAMUCIL) 0.52 g capsule, Take 0.52 g by mouth, Disp: , Rfl:     Patient Active Problem List   Diagnosis     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 male.    HPI    The following portions of the patient's history were reviewed and updated as appropriate:     family history includes Arthritis in his mother; Breast cancer in his mother; Bursitis in his brother; Depression in his mother; Diabetes in his mother; Diverticulosis in his brother; Esophageal cancer in his paternal uncle; Heart disease in his father; Hypertension in his mother; Kidney cancer (age of onset: 57) in his father; No Known Problems in his daughter and son.      reports that he has never smoked. He has been exposed to tobacco smoke. He has never used smokeless tobacco. He reports that he does not currently use alcohol after a past usage of about 2.0 standard drinks of alcohol per week. He reports that he does not use drugs.    Vitals:    09/24/24 1648   BP: 130/81   Pulse: 76   Resp: 20       Review of Systems      Objective:  Patient's shoes and socks removed.   Foot ExamPhysical Exam        General  General Appearance: appears stated age and healthy   Orientation: alert and oriented to person, place, and time   Affect: appropriate   Gait: antalgic         Right Foot/Ankle      Inspection and Palpation  Ecchymosis: none  Swelling: dorsum   Arch: pes planus  Hammertoes: fifth toe  Hallux limitus: yes     Neurovascular  Dorsalis pedis: 3+  Posterior tibial: 3+        Left Foot/Ankle       Inspection and Palpation  Ecchymosis: none  Tenderness: bony tenderness   Swelling: dorsum   Arch: pes planus  Hammertoes: fifth toe  Hallux limitus: yes     Neurovascular  Dorsalis pedis: 3+  Posterior tibial: 3+           Physical Exam  Vitals reviewed.   Constitutional:       Appearance: Normal  appearance.   Cardiovascular:      Rate and Rhythm: Normal rate and regular rhythm.      Pulses:           Dorsalis pedis pulses are 3+ on the right side and 3+ on the left side.        Posterior tibial pulses are 3+ on the right side and 3+ on the left side.   Musculoskeletal:      Left foot: Bony tenderness present.      Comments: Patient has metatarsus adductus type foot.  He pronates in stance and gait.  There is pain with palpation peroneus brevis tendon insertion.  X-ray demonstrates enlarged 5th metatarsal base with possible ossicle of peroneus brevis tendon insertion   Skin:     Capillary Refill: Capillary refill takes less than 2 seconds.      Comments: Patient has dystrophy of all nails.  Patient has seed corn submetatarsal 5 bilateral.  Submetatarsal 5 right foot demonstrates 2 small lesions.  Submetatarsal left has 1 small lesion.  Neurological:      Mental Status: He is alert.   Psychiatric:         Mood and Affect: Mood normal.         Behavior: Behavior normal.         Thought Content: Thought content normal.         Judgment: Judgment normal.

## 2024-09-26 ENCOUNTER — TELEPHONE (OUTPATIENT)
Age: 54
End: 2024-09-26

## 2024-09-26 NOTE — TELEPHONE ENCOUNTER
Caller: Ruel Bishop    Doctor and/or Office: Dr. Gutierrez/[facility]    #: [phone]    Escalation: Last office visit Patient would like to know how long he needs to keep a band aid and antibiotic cream on his blister? Please call and advise. Thank you

## 2024-09-30 NOTE — TELEPHONE ENCOUNTER
Procedure: EGD RFA  Scheduled date of procedure (as of today):01/08/25  Physician performing procedure:DR LOPEZ  Location of procedure:[facility]  Instructions reviewed with patient by: SENT VIA [facility]  Clearances: N/A  Offered sooner jenifert pt wanted week of Jan 6th

## 2024-10-01 ENCOUNTER — OFFICE VISIT (OUTPATIENT)
Age: 54
End: 2024-10-01
Payer: COMMERCIAL

## 2024-10-01 VITALS — BODY MASS INDEX: 37.86 KG/M2 | HEIGHT: 74 IN | RESPIRATION RATE: 20 BRPM | WEIGHT: 295 LBS

## 2024-10-01 DIAGNOSIS — M77.41 METATARSALGIA OF BOTH FEET: Primary | ICD-10-CM

## 2024-10-01 DIAGNOSIS — B07.0 PLANTAR WARTS: ICD-10-CM

## 2024-10-01 DIAGNOSIS — Q66.221 METATARSUS ADDUCTUS OF BOTH FEET: ICD-10-CM

## 2024-10-01 DIAGNOSIS — M77.42 METATARSALGIA OF BOTH FEET: Primary | ICD-10-CM

## 2024-10-01 DIAGNOSIS — Q66.222 METATARSUS ADDUCTUS OF BOTH FEET: ICD-10-CM

## 2024-10-01 DIAGNOSIS — M21.621 TAILOR'S BUNION OF RIGHT FOOT: ICD-10-CM

## 2024-10-01 DIAGNOSIS — M79.671 BILATERAL FOOT PAIN: ICD-10-CM

## 2024-10-01 DIAGNOSIS — M79.672 BILATERAL FOOT PAIN: ICD-10-CM

## 2024-10-01 PROCEDURE — 99213 OFFICE O/P EST LOW 20 MIN: CPT | Performed by: PODIATRIST

## 2024-10-01 NOTE — PROGRESS NOTES
Assessment/Plan: Metatarsalgia.  Atypical verruca bilateral foot.  Pain.  Tailor's bunion right foot.  Metatarsus adductus of foot bilateral.    Plan.  Chart reviewed.  PCP notes reviewed.  Patient examined.  At this time we will continue topical care.  Phenol and silver nitrate applied.  Patient will use bacitracin and Band-Aid until returning from vacation.  Patient advised on proper shoe gear selection and sizing.           Diagnoses and all orders for this visit:    Metatarsalgia of both feet    Plantar warts    Tailor's bunion of right foot    Bilateral foot pain    Metatarsus adductus of both feet          Subjective: Patient is doing better however he still gets pain in the ball of his feet.  Right greater than left.  No history of trauma.  Allergies   Allergen Reactions    Pollen Extract Other (See Comments)     Itchy burning eyes    Cat Hair Extract      Animal dander-triggers asthma         Current Outpatient Medications:     albuterol (Ventolin HFA) 90 mcg/act inhaler, Inhale 2 puffs every 6 (six) hours as needed for wheezing, Disp: 18 g, Rfl: 1    aspirin 81 mg chewable tablet, Chew 81 mg daily, Disp: , Rfl:     montelukast (SINGULAIR) 10 mg tablet, TAKE 1 TABLET DAILY AT BEDTIME, Disp: 90 tablet, Rfl: 1    Multiple Vitamins-Minerals (PRESERVISION AREDS 2+MULTI VIT PO), Take by mouth every morning Last dose 6/14/2020, Disp: , Rfl:     multivitamin (THERAGRAN) TABS, Take 1 tablet by mouth daily [employer] mens multivitamin, Disp: , Rfl:     omeprazole (PriLOSEC) 20 mg delayed release capsule, TAKE 1 CAPSULE DAILY, Disp: 90 capsule, Rfl: 1    psyllium (METAMUCIL) 0.52 g capsule, Take 0.52 g by mouth, Disp: , Rfl:     olopatadine (PATANOL) 0.1 % ophthalmic solution, Administer 1 drop to both eyes 2 (two) times a day (Patient taking differently: Administer 1 drop to both eyes as needed), Disp: 5 mL, Rfl: 2    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 male.    HPI    The following portions of the patient's history were reviewed and updated as appropriate:     family history includes Arthritis in his mother; Breast cancer in his mother; Bursitis in his brother; Depression in his mother; Diabetes in his mother; Diverticulosis in his brother; Esophageal cancer in his paternal uncle; Heart disease in his father; Hypertension in his mother; Kidney cancer (age of onset: 57) in his father; No Known Problems in his daughter and son.      reports that he has never smoked. He has been exposed to tobacco smoke. He has never used smokeless tobacco. He reports that he does not currently use alcohol after a past usage of about 2.0 standard drinks of alcohol per week. He reports that he does not use drugs.    Vitals:    10/01/24 1646   Resp: 20       Review of Systems      Objective:  Patient's shoes and socks removed.   Foot ExamPhysical Exam          General  General Appearance: appears stated age and healthy   Orientation: alert and oriented to person, place, and time   Affect: appropriate   Gait: antalgic         Right Foot/Ankle      Inspection and Palpation  Ecchymosis: none  Swelling: dorsum   Arch: pes planus  Hammertoes: fifth toe  Hallux limitus: yes     Neurovascular  Dorsalis pedis: 3+  Posterior tibial: 3+        Left Foot/Ankle       Inspection and Palpation  Ecchymosis: none  Tenderness: bony tenderness   Swelling: dorsum   Arch: pes planus  Hammertoes: fifth toe  Hallux limitus: yes     Neurovascular  Dorsalis pedis: 3+  Posterior tibial: 3+           Physical Exam  Vitals reviewed.   Constitutional:       Appearance: Normal appearance.   Cardiovascular:      Rate and Rhythm: Normal rate and regular rhythm.       Pulses:           Dorsalis pedis pulses are 3+ on the right side and 3+ on the left side.        Posterior tibial pulses are 3+ on the right side and 3+ on the left side.   Musculoskeletal:      Left foot: Bony tenderness present.      Comments: Patient has metatarsus adductus type foot.  He pronates in stance and gait.  There is pain with palpation peroneus brevis tendon insertion.  X-ray demonstrates enlarged 5th metatarsal base with possible ossicle of peroneus brevis tendon insertion   Skin:     Capillary Refill: Capillary refill takes less than 2 seconds.      Comments: Patient has dystrophy of all nails.  Patient has seed corn submetatarsal 5 bilateral.  Submetatarsal 5 right foot demonstrates 2 small lesions.  Submetatarsal left has 1 small lesion.  Neurological:      Mental Status: He is alert.   Psychiatric:         Mood and Affect: Mood normal.         Behavior: Behavior normal.         Thought Content: Thought content normal.         Judgment: Judgment normal.

## 2024-11-12 ENCOUNTER — OFFICE VISIT (OUTPATIENT)
Age: 54
End: 2024-11-12
Payer: COMMERCIAL

## 2024-11-12 VITALS — RESPIRATION RATE: 17 BRPM | HEIGHT: 74 IN | WEIGHT: 295 LBS | BODY MASS INDEX: 37.86 KG/M2

## 2024-11-12 DIAGNOSIS — M79.671 BILATERAL FOOT PAIN: ICD-10-CM

## 2024-11-12 DIAGNOSIS — B07.0 PLANTAR WARTS: ICD-10-CM

## 2024-11-12 DIAGNOSIS — J30.81 ANIMAL DANDER ALLERGY: ICD-10-CM

## 2024-11-12 DIAGNOSIS — M77.42 METATARSALGIA OF BOTH FEET: Primary | ICD-10-CM

## 2024-11-12 DIAGNOSIS — J45.20 MILD INTERMITTENT ASTHMA WITHOUT COMPLICATION: ICD-10-CM

## 2024-11-12 DIAGNOSIS — M77.41 METATARSALGIA OF BOTH FEET: Primary | ICD-10-CM

## 2024-11-12 DIAGNOSIS — M21.621 TAILOR'S BUNION OF RIGHT FOOT: ICD-10-CM

## 2024-11-12 DIAGNOSIS — M79.672 BILATERAL FOOT PAIN: ICD-10-CM

## 2024-11-12 PROCEDURE — 99213 OFFICE O/P EST LOW 20 MIN: CPT | Performed by: PODIATRIST

## 2024-11-12 NOTE — PROGRESS NOTES
Assessment/Plan: Metatarsalgia.  Atypical verruca bilateral foot.  Pain.  Tailor's bunion right foot.  Metatarsus adductus of foot bilateral.     Plan.  Chart reviewed.  PCP notes reviewed.  Patient examined.  At this time we will continue topical care.  Phenol and silver nitrate applied.  This was bilateral foot.  Patient will watch for signs of recurrence of atypical verruca submetatarsal 5 bilateral.  Patient will use bacitracin and Band-Aid until returning from vacation.  Patient advised on proper shoe gear selection and sizing.  Vitamin B 5 may help.  Patient will consider.  Return as needed                 Assessment  Diagnoses and all orders for this visit:     Metatarsalgia of both feet     Plantar warts     Tailor's bunion of right foot     Bilateral foot pain     Metatarsus adductus of both feet              Subjective: Patient is doing better however he still gets pain in the ball of his feet.  Right greater than left.  No history of trauma.        Allergies   Allergen Reactions    Pollen Extract Other (See Comments)       Itchy burning eyes    Cat Hair Extract         Animal dander-triggers asthma           Current Medications      Current Outpatient Medications:     albuterol (Ventolin HFA) 90 mcg/act inhaler, Inhale 2 puffs every 6 (six) hours as needed for wheezing, Disp: 18 g, Rfl: 1    aspirin 81 mg chewable tablet, Chew 81 mg daily, Disp: , Rfl:     montelukast (SINGULAIR) 10 mg tablet, TAKE 1 TABLET DAILY AT BEDTIME, Disp: 90 tablet, Rfl: 1    Multiple Vitamins-Minerals (PRESERVISION AREDS 2+MULTI VIT PO), Take by mouth every morning Last dose 6/14/2020, Disp: , Rfl:     multivitamin (THERAGRAN) TABS, Take 1 tablet by mouth daily [employer] mens multivitamin, Disp: , Rfl:     omeprazole (PriLOSEC) 20 mg delayed release capsule, TAKE 1 CAPSULE DAILY, Disp: 90 capsule, Rfl: 1    psyllium (METAMUCIL) 0.52 g capsule, Take 0.52 g by mouth, Disp: , Rfl:     olopatadine (PATANOL) 0.1 % ophthalmic solution,  Administer 1 drop to both eyes 2 (two) times a day (Patient taking differently: Administer 1 drop to both eyes as needed), Disp: 5 mL, Rfl: 2        Problem List       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 male.     HPI     The following portions of the patient's history were reviewed and updated as appropriate:      family history includes Arthritis in his mother; Breast cancer in his mother; Bursitis in his brother; Depression in his mother; Diabetes in his mother; Diverticulosis in his brother; Esophageal cancer in his paternal uncle; Heart disease in his father; Hypertension in his mother; Kidney cancer (age of onset: 57) in his father; No Known Problems in his daughter and son.       reports that he has never smoked. He has been exposed to tobacco smoke. He has never used smokeless tobacco. He reports that he does not currently use alcohol after a past usage of about 2.0 standard drinks of alcohol per week. He reports that he does not use drugs.           Objective:  Patient's shoes and socks removed.     Objective  Foot ExamPhysical Exam             General  General Appearance: appears stated age and healthy   Orientation: alert and oriented to person, place, and time   Affect: appropriate   Gait: antalgic         Right Foot/Ankle      Inspection and Palpation  Ecchymosis: none  Swelling: dorsum   Arch: pes planus  Hammertoes: fifth toe  Hallux limitus: yes     Neurovascular  Dorsalis pedis: 3+  Posterior tibial: 3+        Left Foot/Ankle       Inspection and Palpation  Ecchymosis: none  Tenderness: bony tenderness   Swelling: dorsum   Arch: pes planus  Hammertoes: fifth  toe  Hallux limitus: yes     Neurovascular  Dorsalis pedis: 3+  Posterior tibial: 3+           Physical Exam  Vitals reviewed.   Constitutional:       Appearance: Normal appearance.   Cardiovascular:      Rate and Rhythm: Normal rate and regular rhythm.      Pulses:           Dorsalis pedis pulses are 3+ on the right side and 3+ on the left side.        Posterior tibial pulses are 3+ on the right side and 3+ on the left side.   Musculoskeletal:      Left foot: Bony tenderness present.      Comments: Patient has metatarsus adductus type foot.  He pronates in stance and gait.  There is pain with palpation peroneus brevis tendon insertion.  X-ray demonstrates enlarged 5th metatarsal base with possible ossicle of peroneus brevis tendon insertion   Skin:     Capillary Refill: Capillary refill takes less than 2 seconds.      Comments: Patient has dystrophy of all nails.  Patient has seed corn submetatarsal 5 bilateral.  Submetatarsal 5 right foot demonstrates 2 small lesions.  Submetatarsal left has 1 small lesion.  Neurological:      Mental Status: He is alert.   Psychiatric:         Mood and Affect: Mood normal.         Behavior: Behavior normal.         Thought Content: Thought content normal.         Judgment: Judgment normal.

## 2024-11-13 RX ORDER — MONTELUKAST SODIUM 10 MG/1
10 TABLET ORAL
Qty: 90 TABLET | Refills: 1 | Status: SHIPPED | OUTPATIENT
Start: 2024-11-13 | End: 2024-11-15 | Stop reason: SDUPTHER

## 2024-11-15 DIAGNOSIS — J45.20 MILD INTERMITTENT ASTHMA WITHOUT COMPLICATION: ICD-10-CM

## 2024-11-15 DIAGNOSIS — J30.81 ANIMAL DANDER ALLERGY: ICD-10-CM

## 2024-11-15 RX ORDER — MONTELUKAST SODIUM 10 MG/1
10 TABLET ORAL
Qty: 90 TABLET | Refills: 1 | Status: SHIPPED | OUTPATIENT
Start: 2024-11-15

## 2024-11-15 NOTE — TELEPHONE ENCOUNTER
Pt called as the montelukast went to the wrong pharmacy. Please send to mail order as soon as possible.

## 2025-01-08 ENCOUNTER — HOSPITAL ENCOUNTER (OUTPATIENT)
Dept: GASTROENTEROLOGY | Facility: HOSPITAL | Age: 55
Setting detail: OUTPATIENT SURGERY
Discharge: HOME | End: 2025-01-08
Attending: INTERNAL MEDICINE
Payer: COMMERCIAL

## 2025-01-08 ENCOUNTER — ANESTHESIA (OUTPATIENT)
Dept: GASTROENTEROLOGY | Facility: HOSPITAL | Age: 55
End: 2025-01-08
Payer: COMMERCIAL

## 2025-01-08 ENCOUNTER — ANESTHESIA EVENT (OUTPATIENT)
Dept: GASTROENTEROLOGY | Facility: HOSPITAL | Age: 55
End: 2025-01-08
Payer: COMMERCIAL

## 2025-01-08 VITALS
TEMPERATURE: 97.2 F | HEART RATE: 75 BPM | DIASTOLIC BLOOD PRESSURE: 78 MMHG | RESPIRATION RATE: 21 BRPM | SYSTOLIC BLOOD PRESSURE: 123 MMHG | OXYGEN SATURATION: 95 %

## 2025-01-08 DIAGNOSIS — C15.5 MALIGNANT NEOPLASM OF LOWER THIRD OF ESOPHAGUS (HCC): ICD-10-CM

## 2025-01-08 PROCEDURE — C1885 CATH, TRANSLUMIN ANGIO LASER: HCPCS

## 2025-01-08 PROCEDURE — 43270 EGD LESION ABLATION: CPT | Performed by: INTERNAL MEDICINE

## 2025-01-08 RX ORDER — SODIUM CHLORIDE, SODIUM LACTATE, POTASSIUM CHLORIDE, CALCIUM CHLORIDE 600; 310; 30; 20 MG/100ML; MG/100ML; MG/100ML; MG/100ML
INJECTION, SOLUTION INTRAVENOUS CONTINUOUS PRN
Status: DISCONTINUED | OUTPATIENT
Start: 2025-01-08 | End: 2025-01-08

## 2025-01-08 RX ORDER — SODIUM CHLORIDE, SODIUM LACTATE, POTASSIUM CHLORIDE, CALCIUM CHLORIDE 600; 310; 30; 20 MG/100ML; MG/100ML; MG/100ML; MG/100ML
125 INJECTION, SOLUTION INTRAVENOUS CONTINUOUS
OUTPATIENT
Start: 2025-01-08

## 2025-01-08 RX ORDER — LIDOCAINE HYDROCHLORIDE 20 MG/ML
INJECTION, SOLUTION EPIDURAL; INFILTRATION; INTRACAUDAL; PERINEURAL AS NEEDED
Status: DISCONTINUED | OUTPATIENT
Start: 2025-01-08 | End: 2025-01-08

## 2025-01-08 RX ORDER — FENTANYL CITRATE 50 UG/ML
INJECTION, SOLUTION INTRAMUSCULAR; INTRAVENOUS AS NEEDED
Status: DISCONTINUED | OUTPATIENT
Start: 2025-01-08 | End: 2025-01-08

## 2025-01-08 RX ORDER — PROPOFOL 10 MG/ML
INJECTION, EMULSION INTRAVENOUS AS NEEDED
Status: DISCONTINUED | OUTPATIENT
Start: 2025-01-08 | End: 2025-01-08

## 2025-01-08 RX ADMIN — FENTANYL CITRATE 25 MCG: 50 INJECTION INTRAMUSCULAR; INTRAVENOUS at 11:10

## 2025-01-08 RX ADMIN — PROPOFOL 40 MG: 10 INJECTION, EMULSION INTRAVENOUS at 11:05

## 2025-01-08 RX ADMIN — FENTANYL CITRATE 25 MCG: 50 INJECTION INTRAMUSCULAR; INTRAVENOUS at 11:05

## 2025-01-08 RX ADMIN — PROPOFOL 40 MG: 10 INJECTION, EMULSION INTRAVENOUS at 11:08

## 2025-01-08 RX ADMIN — LIDOCAINE HYDROCHLORIDE 100 MG: 20 INJECTION, SOLUTION EPIDURAL; INFILTRATION; INTRACAUDAL; PERINEURAL at 11:02

## 2025-01-08 RX ADMIN — PROPOFOL 40 MG: 10 INJECTION, EMULSION INTRAVENOUS at 11:07

## 2025-01-08 RX ADMIN — PROPOFOL 40 MG: 10 INJECTION, EMULSION INTRAVENOUS at 11:06

## 2025-01-08 RX ADMIN — SODIUM CHLORIDE, SODIUM LACTATE, POTASSIUM CHLORIDE, AND CALCIUM CHLORIDE: .6; .31; .03; .02 INJECTION, SOLUTION INTRAVENOUS at 10:58

## 2025-01-08 RX ADMIN — FENTANYL CITRATE 25 MCG: 50 INJECTION INTRAMUSCULAR; INTRAVENOUS at 11:07

## 2025-01-08 RX ADMIN — PROPOFOL 40 MG: 10 INJECTION, EMULSION INTRAVENOUS at 11:09

## 2025-01-08 RX ADMIN — FENTANYL CITRATE 25 MCG: 50 INJECTION INTRAMUSCULAR; INTRAVENOUS at 11:00

## 2025-01-08 RX ADMIN — PROPOFOL 100 MG: 10 INJECTION, EMULSION INTRAVENOUS at 11:02

## 2025-01-08 RX ADMIN — PROPOFOL 40 MG: 10 INJECTION, EMULSION INTRAVENOUS at 11:04

## 2025-01-08 NOTE — ANESTHESIA PREPROCEDURE EVALUATION
Procedure:  EGD    Relevant Problems   GI/HEPATIC   (+) Adenocarcinoma of esophagus (HCC)   (+) Gastroesophageal reflux disease      MUSCULOSKELETAL   (+) Chronic right-sided low back pain with right-sided sciatica      NEURO/PSYCH   (+) Chronic right-sided low back pain with right-sided sciatica        Physical Exam    Airway    Mallampati score: III         Dental       Cardiovascular      Pulmonary      Other Findings        Anesthesia Plan  ASA Score- 3     Anesthesia Type- IV sedation with anesthesia with ASA Monitors.         Additional Monitors:     Airway Plan:            Plan Factors-Exercise tolerance (METS): >4 METS.    Chart reviewed. EKG reviewed.  Existing labs reviewed. Patient summary reviewed.                  Induction- intravenous.    Postoperative Plan-     Perioperative Resuscitation Plan - Level 1 - Full Code.       Informed Consent- Anesthetic plan and risks discussed with patient and spouse.  I personally reviewed this patient with the CRNA. Discussed and agreed on the Anesthesia Plan with the CRNA..

## 2025-01-08 NOTE — ANESTHESIA POSTPROCEDURE EVALUATION
Post-Op Assessment Note    CV Status:  Stable  Pain Score: 0    Pain management: adequate       Mental Status:  Alert and awake   Hydration Status:  Euvolemic   PONV Controlled:  Controlled   Airway Patency:  Patent     Post Op Vitals Reviewed: Yes    No anethesia notable event occurred.    Staff: CRNA           Last Filed PACU Vitals:  Vitals Value Taken Time   Temp 97.2    Pulse 78    /78    Resp 12    SpO2 95

## 2025-01-08 NOTE — H&P
History and Physical -  [facility]  Red Bishop 54 y.o. male MRN: [id number]                  HPI: Red Bishop is a 54 y.o. year old male who presents for EGD      REVIEW OF SYSTEMS: Per the HPI, and otherwise unremarkable.    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    Snores     Wears glasses      Past Surgical History:   Procedure Laterality Date    COLONOSCOPY  June 2020    EGD AND COLONOSCOPY  2020    FRACTURE SURGERY      slipped epiphysis left femur 1893 pins    KNEE ARTHROSCOPY Right 1997    meniscectomy    PILONIDAL CYST / SINUS EXCISION      SLIPPED CAPITAL FEMORAL EPIPHYSIS PINNING Left 06/1983    TONSILLECTOMY      UPPER GASTROINTESTINAL ENDOSCOPY  June 2020, Nov 2023    VASECTOMY  2008    WISDOM TOOTH EXTRACTION       Social History   Social History     Substance and Sexual Activity   Alcohol Use Yes    Alcohol/week: 2.0 standard drinks of alcohol    Types: 2 Standard drinks or equivalent per week    Comment: 2-3 per week     Social History     Substance and Sexual Activity   Drug Use Never     Social History     Tobacco Use   Smoking Status Never    Passive exposure: Past   Smokeless Tobacco Never     Family History   Problem Relation Age of Onset    Depression Mother     Hypertension Mother     Diabetes Mother     Arthritis Mother     Breast cancer Mother         1981 2006    Heart disease Father         CABGx3, CAD, pacemaker    Kidney cancer Father 57     Bursitis Brother     Diverticulosis Brother     No Known Problems Son     No Known Problems Daughter     Esophageal cancer Paternal Uncle         around age 71 diagnosis.       Meds/Allergies       Current Outpatient Medications:     aspirin 81 mg chewable tablet    montelukast (SINGULAIR) 10 mg tablet    Multiple Vitamins-Minerals (PRESERVISION AREDS 2+MULTI VIT PO)    multivitamin (THERAGRAN) TABS    omeprazole (PriLOSEC) 20 mg delayed release capsule    psyllium (METAMUCIL) 0.52 g capsule    albuterol (Ventolin HFA) 90 mcg/act inhaler    olopatadine (PATANOL) 0.1 % ophthalmic solution    Allergies   Allergen Reactions    Pollen Extract Other (See Comments)     Itchy burning eyes    Cat Hair Extract      Animal dander-triggers asthma       Objective     /92   Pulse 77   Temp (!) 97.1 °F (36.2 °C) (Tympanic)   Resp 16   SpO2 98%       PHYSICAL EXAM    Gen: NAD  Head: NCAT  CV: RRR  CHEST: Clear  ABD: soft, NT/ND  EXT: no edema      ASSESSMENT/PLAN:  This is a 54 y.o. year old male here for EGD, and he is stable and optimized for his procedure.

## 2025-02-04 ENCOUNTER — TELEPHONE (OUTPATIENT)
Age: 55
End: 2025-02-04

## 2025-02-04 NOTE — TELEPHONE ENCOUNTER
Pt called in to schedule Repeat EGD with RFA with Dr. Farrell. Please contact pt to schedule procedure.

## 2025-03-11 DIAGNOSIS — K21.9 GASTROESOPHAGEAL REFLUX DISEASE, UNSPECIFIED WHETHER ESOPHAGITIS PRESENT: ICD-10-CM

## 2025-03-11 RX ORDER — OMEPRAZOLE 20 MG/1
20 CAPSULE, DELAYED RELEASE ORAL DAILY
Qty: 90 CAPSULE | Refills: 1 | Status: SHIPPED | OUTPATIENT
Start: 2025-03-11

## 2025-04-28 DIAGNOSIS — J30.81 ANIMAL DANDER ALLERGY: ICD-10-CM

## 2025-04-28 DIAGNOSIS — J45.20 MILD INTERMITTENT ASTHMA WITHOUT COMPLICATION: ICD-10-CM

## 2025-04-28 RX ORDER — MONTELUKAST SODIUM 10 MG/1
10 TABLET ORAL
Qty: 90 TABLET | Refills: 1 | Status: SHIPPED | OUTPATIENT
Start: 2025-04-28

## 2025-04-28 NOTE — PATIENT INSTRUCTIONS
Chronic Back Pain   AMBULATORY CARE:   Chronic back pain  is back pain that lasts 3 months or longer  This may include pain that has not been controlled or does not improve with treatment  Your back pain may cause weakness or pain that spreads to your arms or legs  Seek immediate care for the following symptoms:   · Severe pain    · New signs of numbness or weakness, especially in your lower back, legs, arms, or genital area    · Unable to control of your bladder or bowel movements    · A fever or sudden weight loss  Contact your healthcare provider if:   · You have new or worsened pain  · You have questions or concerns about your condition or care  Treatment for chronic back pain  may include any of the following:  · NSAIDs , such as ibuprofen, help decrease swelling, pain, and fever  This medicine is available with or without a doctor's order  NSAIDs can cause stomach bleeding or kidney problems in certain people  If you take blood thinner medicine, always ask if NSAIDs are safe for you  Always read the medicine label and follow directions  Do not give these medicines to children under 10months of age without direction from your child's healthcare provider  · Acetaminophen  decreases pain  It is available without a doctor's order  Ask how much to take and how often to take it  Follow directions  Acetaminophen can cause liver damage if not taken correctly  · Prescription pain medicine  may be given  Ask how to take this medicine safely  · Muscle relaxers  help decrease muscle spasms and back pain  · Take your medicine as directed  Contact your healthcare provider if you think your medicine is not helping or if you have side effects  Tell him if you are allergic to any medicine  Keep a list of the medicines, vitamins, and herbs you take  Include the amounts, and when and why you take them  Bring the list or the pill bottles to follow-up visits   Carry your medicine list with you in case of an Medication reconciliation completed. Reviewed Medication list and confirmed med allergies with patient; confirmed with Dr. [facility].     NOTE the times associated with pt's meds from home, please schedule home meds ordered at  accordingly.  Pt has her meds with her for any NOT stocked at  Pharmacy to use as POM. emergency  Manage your chronic back pain:   · Apply heat  on your back for 20 to 30 minutes every 2 hours for as many days as directed  Heat helps decrease pain and muscle spasms  · Stay active  as much as you can without causing more pain  Ask your healthcare provider what exercises are right for you  Do not sit or lie down for long periods  This could make your back pain worse  Avoid heavy lifting until your pain is gone  · Go to physical therapy as directed  A physical therapist teaches you exercises to help improve movement and strength, and to decrease pain  Follow up with your healthcare provider as directed: You may be referred to a sports medicine or spine specialist  Write down your questions so you remember to ask them during your visits  © 2017 [address] Information is for End User's use only and may not be sold, redistributed or otherwise used for commercial purposes  All illustrations and images included in CareNotes® are the copyrighted property of A D A M [employer] Inc  or [employer]ell Redd  The above information is an  only  It is not intended as medical advice for individual conditions or treatments  Talk to your doctor, nurse or pharmacist before following any medical regimen to see if it is safe and effective for you  Chronic Hypertension   WHAT YOU NEED TO KNOW:   Hypertension is high blood pressure (BP)  Your BP is the force of your blood moving against the walls of your arteries  Normal BP is less than 120/80  Prehypertension is between 120/80 and 139/89  Hypertension is 140/90 or higher  Hypertension causes your BP to get so high that your heart has to work much harder than normal  This can damage your heart  Chronic hypertension is a long-term condition that you can control with a healthy lifestyle or medicines  A controlled blood pressure helps protect your organs, such as your heart, lungs, brain, and kidneys     DISCHARGE INSTRUCTIONS:   Call 911 for any of the following:   · You have discomfort in your chest that feels like squeezing, pressure, fullness, or pain  · You become confused or have difficulty speaking  · You suddenly feel lightheaded or have trouble breathing  · You have pain or discomfort in your back, neck, jaw, stomach, or arm  Return to the emergency department if:   · You have a severe headache or vision loss  · You have weakness in an arm or leg  Contact your healthcare provider if:   · You feel faint, dizzy, confused, or drowsy  · You have been taking your BP medicine and your BP is still higher than your healthcare provider says it should be  · You have questions or concerns about your condition or care  Medicines: You may need any of the following:  · Medicine  may be used to help lower your BP  You may need more than one type of medicine  Take the medicine exactly as directed  · Diuretics  help decrease extra fluid that collects in your body  This will help lower your BP  You may urinate more often while you take this medicine  · Cholesterol medicine  helps lower your cholesterol level  A low cholesterol level helps prevent heart disease and makes it easier to control your blood pressure  · Take your medicine as directed  Contact your healthcare provider if you think your medicine is not helping or if you have side effects  Tell him or her if you are allergic to any medicine  Keep a list of the medicines, vitamins, and herbs you take  Include the amounts, and when and why you take them  Bring the list or the pill bottles to follow-up visits  Carry your medicine list with you in case of an emergency  Follow up with your healthcare provider as directed: You will need to return to have your blood pressure checked and to have other lab tests done  Write down your questions so you remember to ask them during your visits    Manage chronic hypertension:  Talk with your healthcare provider about these and other ways to manage hypertension:  · Take your BP at home  Sit and rest for 5 minutes before you take your BP  Extend your arm and support it on a flat surface  Your arm should be at the same level as your heart  Follow the directions that came with your BP monitor  If possible, take at least 2 BP readings each time  Take your BP at least twice a day at the same times each day, such as morning and evening  Keep a record of your BP readings and bring it to your follow-up visits  Ask your healthcare provider what your blood pressure should be  · Limit sodium (salt) as directed  Too much sodium can affect your fluid balance  Check labels to find low-sodium or no-salt-added foods  Some low-sodium foods use potassium salts for flavor  Too much potassium can also cause health problems  Your healthcare provider will tell you how much sodium and potassium are safe for you to have in a day  He or she may recommend that you limit sodium to 2,300 mg a day  · Follow the meal plan recommended by your healthcare provider  A dietitian or your provider can give you more information on low-sodium plans or the DASH (Dietary Approaches to Stop Hypertension) eating plan  The DASH plan is low in sodium, unhealthy fats, and total fat  It is high in potassium, calcium, and fiber  · Exercise to maintain a healthy weight  Exercise at least 30 minutes per day, on most days of the week  This will help decrease your blood pressure  Ask about the best exercise plan for you  · Decrease stress  This may help lower your BP  Learn ways to relax, such as deep breathing or listening to music  · Limit alcohol  Women should limit alcohol to 1 drink a day  Men should limit alcohol to 2 drinks a day  A drink of alcohol is 12 ounces of beer, 5 ounces of wine, or 1½ ounces of liquor  · Do not smoke  Nicotine and other chemicals in cigarettes and cigars can increase your BP and also cause lung damage   Ask your healthcare provider for information if you currently smoke and need help to quit  E-cigarettes or smokeless tobacco still contain nicotine  Talk to your healthcare provider before you use these products  © 2017 [address] Information is for End User's use only and may not be sold, redistributed or otherwise used for commercial purposes  All illustrations and images included in CareNotes® are the copyrighted property of A D A M , Inc  or Donell Redd  The above information is an  only  It is not intended as medical advice for individual conditions or treatments  Talk to your doctor, nurse or pharmacist before following any medical regimen to see if it is safe and effective for you

## 2025-05-02 ENCOUNTER — HOSPITAL ENCOUNTER (OUTPATIENT)
Dept: GASTROENTEROLOGY | Facility: HOSPITAL | Age: 55
Setting detail: OUTPATIENT SURGERY
End: 2025-05-02
Attending: INTERNAL MEDICINE
Payer: COMMERCIAL

## 2025-05-02 ENCOUNTER — ANESTHESIA (OUTPATIENT)
Dept: GASTROENTEROLOGY | Facility: HOSPITAL | Age: 55
End: 2025-05-02
Payer: COMMERCIAL

## 2025-05-02 ENCOUNTER — ANESTHESIA EVENT (OUTPATIENT)
Dept: GASTROENTEROLOGY | Facility: HOSPITAL | Age: 55
End: 2025-05-02
Payer: COMMERCIAL

## 2025-05-02 VITALS
OXYGEN SATURATION: 96 % | TEMPERATURE: 97.9 F | SYSTOLIC BLOOD PRESSURE: 112 MMHG | DIASTOLIC BLOOD PRESSURE: 74 MMHG | HEART RATE: 77 BPM | RESPIRATION RATE: 18 BRPM

## 2025-05-02 DIAGNOSIS — C15.5 MALIGNANT NEOPLASM OF LOWER THIRD OF ESOPHAGUS (HCC): ICD-10-CM

## 2025-05-02 PROCEDURE — 43270 EGD LESION ABLATION: CPT | Performed by: INTERNAL MEDICINE

## 2025-05-02 PROCEDURE — C1886 CATHETER, ABLATION: HCPCS

## 2025-05-02 RX ORDER — PROPOFOL 10 MG/ML
INJECTION, EMULSION INTRAVENOUS AS NEEDED
Status: DISCONTINUED | OUTPATIENT
Start: 2025-05-02 | End: 2025-05-02

## 2025-05-02 RX ORDER — SODIUM CHLORIDE, SODIUM LACTATE, POTASSIUM CHLORIDE, CALCIUM CHLORIDE 600; 310; 30; 20 MG/100ML; MG/100ML; MG/100ML; MG/100ML
INJECTION, SOLUTION INTRAVENOUS CONTINUOUS PRN
Status: DISCONTINUED | OUTPATIENT
Start: 2025-05-02 | End: 2025-05-02

## 2025-05-02 RX ORDER — FENTANYL CITRATE 50 UG/ML
INJECTION, SOLUTION INTRAMUSCULAR; INTRAVENOUS AS NEEDED
Status: DISCONTINUED | OUTPATIENT
Start: 2025-05-02 | End: 2025-05-02

## 2025-05-02 RX ORDER — LIDOCAINE HYDROCHLORIDE 20 MG/ML
INJECTION, SOLUTION EPIDURAL; INFILTRATION; INTRACAUDAL; PERINEURAL AS NEEDED
Status: DISCONTINUED | OUTPATIENT
Start: 2025-05-02 | End: 2025-05-02

## 2025-05-02 RX ADMIN — FENTANYL CITRATE 50 MCG: 50 INJECTION INTRAMUSCULAR; INTRAVENOUS at 08:28

## 2025-05-02 RX ADMIN — PROPOFOL 150 MG: 10 INJECTION, EMULSION INTRAVENOUS at 08:28

## 2025-05-02 RX ADMIN — FENTANYL CITRATE 50 MCG: 50 INJECTION INTRAMUSCULAR; INTRAVENOUS at 08:31

## 2025-05-02 RX ADMIN — SODIUM CHLORIDE, SODIUM LACTATE, POTASSIUM CHLORIDE, AND CALCIUM CHLORIDE: .6; .31; .03; .02 INJECTION, SOLUTION INTRAVENOUS at 08:24

## 2025-05-02 RX ADMIN — LIDOCAINE HYDROCHLORIDE 100 MG: 20 INJECTION, SOLUTION EPIDURAL; INFILTRATION; INTRACAUDAL; PERINEURAL at 08:34

## 2025-05-02 NOTE — H&P
History and Physical -  [facility]  Red Bishop 54 y.o. male MRN: [id number]                  HPI: Red Bishop is a 54 y.o. year old male who presents for EGD with RFA      REVIEW OF SYSTEMS: Per the HPI, and otherwise unremarkable.    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    Snores     Wears glasses      Past Surgical History:   Procedure Laterality Date    COLONOSCOPY  June 2020    EGD AND COLONOSCOPY  2020    FRACTURE SURGERY      slipped epiphysis left femur 1893 pins    KNEE ARTHROSCOPY Right 1997    meniscectomy    PILONIDAL CYST / SINUS EXCISION      SLIPPED CAPITAL FEMORAL EPIPHYSIS PINNING Left 06/1983    TONSILLECTOMY      UPPER GASTROINTESTINAL ENDOSCOPY  June 2020, Nov 2023    VASECTOMY  2008    WISDOM TOOTH EXTRACTION       Social History   Social History     Substance and Sexual Activity   Alcohol Use Yes    Alcohol/week: 2.0 standard drinks of alcohol    Types: 2 Standard drinks or equivalent per week    Comment: 2-3 per week     Social History     Substance and Sexual Activity   Drug Use Never     Social History     Tobacco Use   Smoking Status Never    Passive exposure: Past   Smokeless Tobacco Never     Family History   Problem Relation Age of Onset    Depression Mother     Hypertension Mother     Diabetes Mother     Arthritis Mother     Breast cancer Mother         1981 2006    Heart disease Father         CABGx3, CAD, pacemaker    Kidney cancer Father  57    Bursitis Brother     Diverticulosis Brother     No Known Problems Son     No Known Problems Daughter     Esophageal cancer Paternal Uncle         around age 71 diagnosis.       Meds/Allergies       Current Outpatient Medications:     aspirin 81 mg chewable tablet    montelukast (SINGULAIR) 10 mg tablet    Multiple Vitamins-Minerals (PRESERVISION AREDS 2+MULTI VIT PO)    multivitamin (THERAGRAN) TABS    omeprazole (PriLOSEC) 20 mg delayed release capsule    psyllium (METAMUCIL) 0.52 g capsule    albuterol (Ventolin HFA) 90 mcg/act inhaler    olopatadine (PATANOL) 0.1 % ophthalmic solution    Allergies   Allergen Reactions    Pollen Extract Other (See Comments)     Itchy burning eyes    Cat Dander      Animal dander-triggers asthma       Objective     /85   Pulse 70   Temp 98 °F (36.7 °C) (Tympanic)   Resp 16   SpO2 96%       PHYSICAL EXAM    Gen: NAD  Head: NCAT  CV: RRR  CHEST: Clear  ABD: soft, NT/ND  EXT: no edema      ASSESSMENT/PLAN:  This is a 54 y.o. year old male here for EGD, and he is stable and optimized for his procedure.

## 2025-05-02 NOTE — PATIENT INSTRUCTIONS
Chronic Back Pain   AMBULATORY CARE:   Chronic back pain  is back pain that lasts 3 months or longer  This may include pain that has not been controlled or does not improve with treatment  Your back pain may cause weakness or pain that spreads to your arms or legs  Seek immediate care for the following symptoms:   · Severe pain    · New signs of numbness or weakness, especially in your lower back, legs, arms, or genital area    · Unable to control of your bladder or bowel movements    · A fever or sudden weight loss  Contact your healthcare provider if:   · You have new or worsened pain  · You have questions or concerns about your condition or care  Treatment for chronic back pain  may include any of the following:  · NSAIDs , such as ibuprofen, help decrease swelling, pain, and fever  This medicine is available with or without a doctor's order  NSAIDs can cause stomach bleeding or kidney problems in certain people  If you take blood thinner medicine, always ask if NSAIDs are safe for you  Always read the medicine label and follow directions  Do not give these medicines to children under 10months of age without direction from your child's healthcare provider  · Acetaminophen  decreases pain  It is available without a doctor's order  Ask how much to take and how often to take it  Follow directions  Acetaminophen can cause liver damage if not taken correctly  · Prescription pain medicine  may be given  Ask how to take this medicine safely  · Muscle relaxers  help decrease muscle spasms and back pain  · Take your medicine as directed  Contact your healthcare provider if you think your medicine is not helping or if you have side effects  Tell him if you are allergic to any medicine  Keep a list of the medicines, vitamins, and herbs you take  Include the amounts, and when and why you take them  Bring the list or the pill bottles to follow-up visits   Carry your medicine list with you in case of an emergency  Manage your chronic back pain:   · Apply heat  on your back for 20 to 30 minutes every 2 hours for as many days as directed  Heat helps decrease pain and muscle spasms  · Stay active  as much as you can without causing more pain  Ask your healthcare provider what exercises are right for you  Do not sit or lie down for long periods  This could make your back pain worse  Avoid heavy lifting until your pain is gone  · Go to physical therapy as directed  A physical therapist teaches you exercises to help improve movement and strength, and to decrease pain  Follow up with your healthcare provider as directed: You may be referred to a sports medicine or spine specialist  Write down your questions so you remember to ask them during your visits  © 2017 [employer]  Information is for End User's use only and may not be sold, redistributed or otherwise used for commercial purposes  All illustrations and images included in CareNotes® are the copyrighted property of [employer]  or [employer]  The above information is an  only  It is not intended as medical advice for individual conditions or treatments  Talk to your doctor, nurse or pharmacist before following any medical regimen to see if it is safe and effective for you  IR drainage of abscess

## 2025-05-02 NOTE — ANESTHESIA PREPROCEDURE EVALUATION
Procedure:  EGD    Relevant Problems   GI/HEPATIC   (+) Adenocarcinoma of esophagus (HCC)   (+) Gastroesophageal reflux disease      MUSCULOSKELETAL   (+) Chronic right-sided low back pain with right-sided sciatica      NEURO/PSYCH   (+) Chronic right-sided low back pain with right-sided sciatica        Physical Exam    Airway    Mallampati score: II  TM Distance: >3 FB  Neck ROM: full     Dental   No notable dental hx     Cardiovascular  Rhythm: regular, Rate: normal    Pulmonary   Breath sounds clear to auscultation    Other Findings        Anesthesia Plan  ASA Score- 3     Anesthesia Type- IV sedation with anesthesia with ASA Monitors.         Additional Monitors:     Airway Plan:            Plan Factors-Exercise tolerance (METS): >4 METS.    Chart reviewed.   Existing labs reviewed. Patient summary reviewed.    Patient is not a current smoker.              Induction- intravenous.    Postoperative Plan-         Informed Consent- Anesthetic plan and risks discussed with patient.  I personally reviewed this patient with the CRNA. Discussed and agreed on the Anesthesia Plan with the CRNA..      NPO Status:  Vitals Value Taken Time   Date of last liquid 05/01/25 05/02/25 0718   Time of last liquid 2030 05/02/25 0718   Date of last solid 05/01/25 05/02/25 0718   Time of last solid 2030 05/02/25 0718

## 2025-05-02 NOTE — ANESTHESIA POSTPROCEDURE EVALUATION
Post-Op Assessment Note    CV Status:  Stable  Pain Score: 0    Pain management: adequate       Mental Status:  Alert and awake   Hydration Status:  Euvolemic and stable   PONV Controlled:  Controlled   Airway Patency:  Patent and adequate     Post Op Vitals Reviewed: Yes    No anethesia notable event occurred.    Staff: CRNA           Last Filed PACU Vitals:  Vitals Value Taken Time   Temp 97.9 °F (36.6 °C) 05/02/25 0839   Pulse 66 05/02/25 0839   /72 05/02/25 0839   Resp 18 05/02/25 0839   SpO2 94 % 05/02/25 0839       Modified Mary:     Vitals Value Taken Time   Activity 2 05/02/25 0841   Respiration 2 05/02/25 0841   Circulation 2 05/02/25 0841   Consciousness 1 05/02/25 0841   Oxygen Saturation 2 05/02/25 0841     Modified Mary Score: 9

## 2025-07-02 ENCOUNTER — OFFICE VISIT (OUTPATIENT)
Age: 55
End: 2025-07-02
Payer: COMMERCIAL

## 2025-07-02 VITALS — HEIGHT: 74 IN | RESPIRATION RATE: 17 BRPM | WEIGHT: 295 LBS | BODY MASS INDEX: 37.86 KG/M2

## 2025-07-02 DIAGNOSIS — M54.16 RADICULOPATHY OF LUMBAR REGION: ICD-10-CM

## 2025-07-02 DIAGNOSIS — M79.672 LEFT FOOT PAIN: ICD-10-CM

## 2025-07-02 DIAGNOSIS — M77.41 METATARSALGIA OF BOTH FEET: Primary | ICD-10-CM

## 2025-07-02 DIAGNOSIS — M54.31 SCIATICA OF RIGHT SIDE: ICD-10-CM

## 2025-07-02 DIAGNOSIS — B07.0 PLANTAR WARTS: ICD-10-CM

## 2025-07-02 DIAGNOSIS — M77.42 METATARSALGIA OF BOTH FEET: Primary | ICD-10-CM

## 2025-07-02 PROCEDURE — 99213 OFFICE O/P EST LOW 20 MIN: CPT | Performed by: PODIATRIST

## 2025-07-02 PROCEDURE — 17110 DESTRUCTION B9 LES UP TO 14: CPT | Performed by: PODIATRIST

## 2025-07-02 RX ORDER — GABAPENTIN 100 MG/1
100 CAPSULE ORAL
Qty: 30 CAPSULE | Refills: 1 | Status: SHIPPED | OUTPATIENT
Start: 2025-07-02 | End: 2025-08-31

## 2025-07-02 NOTE — PROGRESS NOTES
Assessment/Plan: Sciatica right lower extremity secondary radiculopathy.  Metatarsalgia.  Pain left foot secondary atypical verruca.    Plan.  Chart reviewed.  PCP notes reviewed.  Patient evaluated.  At this time we will do low back pain workup.  X-rays ordered.  Patient is being referred for chiropractic care.  We will start the patient on an empiric course of gabapentin.  Today plantar verruca left foot treated with Cantharone.  Aftercare instruction given.  Return for follow-up.    Lesion Destruction    Date/Time: 7/2/2025 2:15 PM    Performed by: Romero Gutierrez DPM  Authorized by: Romero Gutierrez DPM    Universal Protocol:  procedure performed by consultantConsent: Verbal consent obtained. Written consent not obtained  Risks and benefits: risks, benefits and alternatives were discussed  Consent given by: patient  Timeout called at: 7/2/2025 2:19 PM.  Patient understanding: patient states understanding of the procedure being performed  Patient identity confirmed: verbally with patient    Procedure Details - Lesion Destruction:     Number of Lesions:  1  Lesion 1:     Body area:  Lower extremity    Lower extremity location:  L foot    Malignancy: benign lesion      Destruction method: chemical removal               Diagnoses and all orders for this visit:    Metatarsalgia of both feet    Radiculopathy of lumbar region  -     XR spine lumbar minimum 4 views non injury; Future  -     gabapentin (NEURONTIN) 100 mg capsule; Take 1 capsule (100 mg total) by mouth daily at bedtime    Plantar warts    Left foot pain    Sciatica of right side          Subjective: Patient has 2 concerns.  He has pain in the ball of the left foot.  He presents for evaluation.  No history of trauma.  He also now has a numb right foot.  He gets this after prolonged sitting.  Patient has chronic low back pain.    Allergies   Allergen Reactions    Pollen Extract Other (See Comments)     Itchy burning eyes    Cat Dander      Animal  dander-triggers asthma       Current Medications[1]    Problem List[2]       Patient ID: Red Bishop is a 54 y.o. male.    HPI    The following portions of the patient's history were reviewed and updated as appropriate:     family history includes Arthritis in his mother; Breast cancer in his mother; Bursitis in his brother; Depression in his mother; Diabetes in his mother; Diverticulosis in his brother; Esophageal cancer in his paternal uncle; Heart disease in his father; Hypertension in his mother; Kidney cancer (age of onset: 57) in his father; No Known Problems in his daughter and son.      reports that he has never smoked. He has been exposed to tobacco smoke. He has never used smokeless tobacco. He reports current alcohol use of about 2.0 standard drinks of alcohol per week. He reports that he does not use drugs.    Vitals:    07/02/25 1408   Resp: 17       Review of Systems      Objective:  Patient's shoes and socks removed.   Foot ExamPhysical Exam          General  General Appearance: appears stated age and healthy   Orientation: alert and oriented to person, place, and time   Affect: appropriate   Gait: antalgic         Right Foot/Ankle      Inspection and Palpation  Ecchymosis: none  Swelling: dorsum   Arch: pes planus  Hammertoes: fifth toe  Hallux limitus: yes     Neurovascular  Dorsalis pedis: 3+  Posterior tibial: 3+        Left Foot/Ankle       Inspection and Palpation  Ecchymosis: none  Tenderness: bony tenderness   Swelling: dorsum   Arch: pes planus  Hammertoes: fifth toe  Hallux limitus: yes     Neurovascular  Dorsalis pedis: 3+  Posterior tibial: 3+           Physical Exam  Vitals reviewed.   Constitutional:       Appearance: Normal appearance.   Cardiovascular:      Rate and Rhythm: Normal rate and regular rhythm.      Pulses:           Dorsalis pedis pulses are 3+ on the right side and 3+ on the left side.        Posterior tibial pulses are 3+ on the right side and 3+ on the left side.    Musculoskeletal:      Left foot: Bony tenderness present.      Comments: Patient has metatarsus adductus type foot.  He pronates in stance and gait.  There is pain with palpation peroneus brevis tendon insertion.  X-ray demonstrates enlarged 5th metatarsal base with possible ossicle of peroneus brevis tendon insertion   Skin:     Capillary Refill: Capillary refill takes less than 2 seconds.      Comments: Patient has dystrophy of all nails.  Patient has seed corn submetatarsal 5 bilateral.  Submetatarsal 5 left foot demonstrates small atypical verruca.  Neurological:      Mental Status: He is alert.  Patient demonstrates decreased vibratory of the right lower extremity.  He tested 5/5 muscle testing.  Psychiatric:         Mood and Affect: Mood normal.         Behavior: Behavior normal.         Thought Content: Thought content normal.         Judgment: Judgment normal.                       [1]   Current Outpatient Medications:     gabapentin (NEURONTIN) 100 mg capsule, Take 1 capsule (100 mg total) by mouth daily at bedtime, Disp: 30 capsule, Rfl: 1    albuterol (Ventolin HFA) 90 mcg/act inhaler, Inhale 2 puffs every 6 (six) hours as needed for wheezing, Disp: 18 g, Rfl: 1    aspirin 81 mg chewable tablet, Chew 81 mg daily, Disp: , Rfl:     montelukast (SINGULAIR) 10 mg tablet, TAKE 1 TABLET DAILY AT BEDTIME, Disp: 90 tablet, Rfl: 1    Multiple Vitamins-Minerals (PRESERVISION AREDS 2+MULTI VIT PO), Take by mouth every morning Last dose 6/14/2020, Disp: , Rfl:     multivitamin (THERAGRAN) TABS, Take 1 tablet by mouth daily [employer] mens multivitamin, Disp: , Rfl:     olopatadine (PATANOL) 0.1 % ophthalmic solution, Administer 1 drop to both eyes 2 (two) times a day (Patient taking differently: Administer 1 drop to both eyes as needed), Disp: 5 mL, Rfl: 2    omeprazole (PriLOSEC) 20 mg delayed release capsule, TAKE 1 CAPSULE DAILY, Disp: 90 capsule, Rfl: 1    psyllium (METAMUCIL) 0.52 g capsule, Take 0.52 g by mouth  daily, Disp: , Rfl:   [2]   Patient Active Problem List  Diagnosis    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

## 2025-07-21 ENCOUNTER — RA CDI HCC (OUTPATIENT)
Dept: OTHER | Facility: HOSPITAL | Age: 55
End: 2025-07-21

## 2025-07-25 ENCOUNTER — APPOINTMENT (OUTPATIENT)
Dept: RADIOLOGY | Facility: CLINIC | Age: 55
End: 2025-07-25
Payer: COMMERCIAL

## 2025-07-25 ENCOUNTER — OFFICE VISIT (OUTPATIENT)
Dept: FAMILY MEDICINE CLINIC | Facility: CLINIC | Age: 55
End: 2025-07-25
Payer: COMMERCIAL

## 2025-07-25 VITALS
HEART RATE: 74 BPM | TEMPERATURE: 97.3 F | WEIGHT: 302 LBS | SYSTOLIC BLOOD PRESSURE: 120 MMHG | BODY MASS INDEX: 38.76 KG/M2 | HEIGHT: 74 IN | RESPIRATION RATE: 14 BRPM | DIASTOLIC BLOOD PRESSURE: 88 MMHG | OXYGEN SATURATION: 96 %

## 2025-07-25 DIAGNOSIS — J45.20 MILD INTERMITTENT ASTHMA WITHOUT COMPLICATION: ICD-10-CM

## 2025-07-25 DIAGNOSIS — E87.8 ELECTROLYTE ABNORMALITY: ICD-10-CM

## 2025-07-25 DIAGNOSIS — E78.5 DYSLIPIDEMIA: ICD-10-CM

## 2025-07-25 DIAGNOSIS — M54.16 RADICULOPATHY OF LUMBAR REGION: ICD-10-CM

## 2025-07-25 DIAGNOSIS — H31.103: ICD-10-CM

## 2025-07-25 DIAGNOSIS — K21.9 GASTROESOPHAGEAL REFLUX DISEASE WITHOUT ESOPHAGITIS: ICD-10-CM

## 2025-07-25 DIAGNOSIS — Z00.00 ANNUAL PHYSICAL EXAM: Primary | ICD-10-CM

## 2025-07-25 DIAGNOSIS — E53.8 B12 DEFICIENCY: ICD-10-CM

## 2025-07-25 DIAGNOSIS — C15.9 ADENOCARCINOMA OF ESOPHAGUS (HCC): ICD-10-CM

## 2025-07-25 DIAGNOSIS — Z13.29 THYROID DISORDER SCREENING: ICD-10-CM

## 2025-07-25 DIAGNOSIS — G89.29 CHRONIC RIGHT-SIDED LOW BACK PAIN WITH RIGHT-SIDED SCIATICA: ICD-10-CM

## 2025-07-25 DIAGNOSIS — M54.41 CHRONIC RIGHT-SIDED LOW BACK PAIN WITH RIGHT-SIDED SCIATICA: ICD-10-CM

## 2025-07-25 DIAGNOSIS — E66.01 MORBID OBESITY (HCC): ICD-10-CM

## 2025-07-25 DIAGNOSIS — Z13.0 SCREENING, IRON DEFICIENCY ANEMIA: ICD-10-CM

## 2025-07-25 DIAGNOSIS — L23.9 ALLERGIC CONTACT DERMATITIS, UNSPECIFIED TRIGGER: ICD-10-CM

## 2025-07-25 DIAGNOSIS — Z12.5 PROSTATE CANCER SCREENING: ICD-10-CM

## 2025-07-25 DIAGNOSIS — E55.9 VITAMIN D DEFICIENCY: ICD-10-CM

## 2025-07-25 DIAGNOSIS — Z13.1 DIABETES MELLITUS SCREENING: ICD-10-CM

## 2025-07-25 PROBLEM — K22.70 BARRETT'S ESOPHAGUS WITHOUT DYSPLASIA: Status: RESOLVED | Noted: 2020-07-22 | Resolved: 2025-07-25

## 2025-07-25 PROCEDURE — 72110 X-RAY EXAM L-2 SPINE 4/>VWS: CPT

## 2025-07-25 PROCEDURE — 99396 PREV VISIT EST AGE 40-64: CPT | Performed by: STUDENT IN AN ORGANIZED HEALTH CARE EDUCATION/TRAINING PROGRAM

## 2025-07-25 RX ORDER — TRIAMCINOLONE ACETONIDE 1 MG/G
CREAM TOPICAL 2 TIMES DAILY
Qty: 45 G | Refills: 0 | Status: SHIPPED | OUTPATIENT
Start: 2025-07-25

## 2025-07-25 NOTE — PROGRESS NOTES
Adult Annual Physical  Name: Red Bishop      : 1970      MRN: [id number]  Encounter Provider: Jayjay Mirza DO  Encounter Date: 2025   Encounter department: [facility]  Assessment & Plan  Annual physical exam         Mild intermittent asthma without complication         Adenocarcinoma of esophagus (HCC)  Continue PPI therapy, patient follows closely with routine EGD screenings with gastroenterology       Gastroesophageal reflux disease without esophagitis         Chronic right-sided low back pain with right-sided sciatica  Patient has followed up with conservative measures including physical therapy, recommend spine and pain management given minimal relief  Orders:  •  Ambulatory referral to Spine & Pain Management; Future    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  Continue follow-up with ophthalmology       Dyslipidemia    Orders:  •  Lipid panel; Future    Morbid obesity (HCC)         Electrolyte abnormality    Orders:  •  Comprehensive metabolic panel; Future    Screening, iron deficiency anemia    Orders:  •  CBC and differential; Future    B12 deficiency    Orders:  •  Vitamin B12; Future    Vitamin D deficiency    Orders:  •  Vitamin D 25 hydroxy; Future    Thyroid disorder screening    Orders:  •  T4, free; Future  •  TSH, 3rd generation; Future    Prostate cancer screening    Orders:  •  PSA, total and free; Future    Diabetes mellitus screening    Orders:  •  Hemoglobin A1C With EAG; Future    Allergic contact dermatitis, unspecified trigger    Orders:  •  triamcinolone (KENALOG) 0.1 % cream; Apply topically 2 (two) times a day        Preventive Screenings:  - Diabetes Screening: orders placed  - Cholesterol Screening: orders placed   - Colon cancer screening: screening up-to-date   - Lung cancer screening: screening not indicated   - Prostate cancer screening: orders placed     Immunizations:    - Risks/benefits immunizations discussed       Counseling/Anticipatory Guidance:  - Alcohol: discussed moderation in alcohol intake and recommendations for healthy alcohol use.   - Drug use: discussed harms of illicit drug use and how it can negatively impact mental/physical health.   - Tobacco use: discussed harms of tobacco use and management options for quitting.   - Dental health: discussed importance of regular tooth brushing, flossing, and dental visits.   - Sexual health: discussed sexually transmitted diseases, partner selection, use of condoms, avoidance of unintended pregnancy, and contraceptive alternatives.   - Diet: discussed recommendations for a healthy/well-balanced diet.   - Exercise: the importance of regular exercise/physical activity was discussed. Recommend exercise 3-5 times per week for at least 30 minutes.   - Injury prevention: discussed safety/seat belts, safety helmets, smoke detectors, carbon monoxide detectors, and smoking near bedding or upholstery.          History of Present Illness     Adult Annual Physical:  Patient presents for annual physical.     Diet and Physical Activity:  - Diet/Nutrition: adequate fiber intake.  - Exercise: walking, moderate cardiovascular exercise and 30-60 minutes on average.    General Health:  - Sleep: 4-6 hours of sleep on average.  - Hearing: normal hearing right ear.  - Vision: vision problems, most recent eye exam > 1 year ago and wears glasses.  - Dental: regular dental visits, brushes teeth once daily and floss regularly.    /GYN Health:  - Follows with GYN: no.   - History of STDs: no     Health:  - History of STDs: no.   - Urinary symptoms: none.     Advanced Care Planning:  - Has an advanced directive?: no    - Has a durable medical POA?: no      Review of Systems   Constitutional:  Negative for chills and fever.   HENT:  Negative for ear pain and sore throat.    Eyes:  Negative for pain and visual disturbance.   Respiratory:  Negative for cough and shortness of breath.   "  Cardiovascular:  Negative for chest pain and palpitations.   Gastrointestinal:  Negative for abdominal pain, constipation, diarrhea, nausea and vomiting.   Genitourinary:  Negative for dysuria and hematuria.   Musculoskeletal:  Negative for arthralgias and back pain.   Skin:  Negative for color change and rash.   Neurological:  Negative for seizures and syncope.   Psychiatric/Behavioral:  Negative for agitation, behavioral problems and confusion.    All other systems reviewed and are negative.        Objective   /88 (BP Location: Left arm, Patient Position: Sitting, Cuff Size: Adult)   Pulse 74   Temp (!) 97.3 °F (36.3 °C) (Temporal)   Resp 14   Ht 6' 2\" (1.88 m)   Wt (!) 137 kg (302 lb)   SpO2 96%   BMI 38.77 kg/m²     Physical Exam  Vitals and nursing note reviewed.   Constitutional:       General: He is not in acute distress.     Appearance: He is well-developed.   HENT:      Head: Normocephalic and atraumatic.     Eyes:      General: No scleral icterus.     Conjunctiva/sclera: Conjunctivae normal.       Cardiovascular:      Rate and Rhythm: Normal rate and regular rhythm.      Pulses: Normal pulses.      Heart sounds: No murmur heard.  Pulmonary:      Effort: Pulmonary effort is normal. No respiratory distress.      Breath sounds: Normal breath sounds.   Abdominal:      General: There is no distension.      Palpations: Abdomen is soft.      Tenderness: There is no abdominal tenderness.     Musculoskeletal:         General: No swelling. Normal range of motion.      Cervical back: Neck supple.     Skin:     General: Skin is warm and dry.      Capillary Refill: Capillary refill takes less than 2 seconds.     Neurological:      General: No focal deficit present.      Mental Status: He is alert and oriented to person, place, and time. Mental status is at baseline.     Psychiatric:         Mood and Affect: Mood normal.         Behavior: Behavior normal.         "

## 2025-07-25 NOTE — ASSESSMENT & PLAN NOTE
Continue PPI therapy, patient follows closely with routine EGD screenings with gastroenterology

## 2025-07-25 NOTE — ASSESSMENT & PLAN NOTE
Patient has followed up with conservative measures including physical therapy, recommend spine and pain management given minimal relief  Orders:  •  Ambulatory referral to Spine & Pain Management; Future

## 2025-07-30 ENCOUNTER — RESULTS FOLLOW-UP (OUTPATIENT)
Age: 55
End: 2025-07-30